# Patient Record
Sex: MALE | Race: WHITE | NOT HISPANIC OR LATINO | Employment: OTHER | URBAN - METROPOLITAN AREA
[De-identification: names, ages, dates, MRNs, and addresses within clinical notes are randomized per-mention and may not be internally consistent; named-entity substitution may affect disease eponyms.]

---

## 2018-01-15 NOTE — PROCEDURES
Results/Data    Procedure: Electromyogram and Nerve Conduction Study  Indication: Left Lower Extremity   Referred by Dr Jorge Puri  The procedure's were discussed with the patient  Written consent was obtained prior to the procedure and is detailed in the patient's record  Prior to the start of the procedure a time out was taken and the identity of the patient was confirmed via name and date of birth with the patient  The correct site and the procedure to be performed were confirmed  The correct side was confirmed if applicable  The positioning of the patient was verified  The availability of the correct equipment was verified  Procedure Start Time: 1:15    Technique: A sterile concentric needle electrode was used  The patient tolerated the procedure well  There were no complications  Results  :EMG LEFT LOWER EXTREMITY    Motor and sensory conduction studies were performed on the left peroneal tibial and sural nerves  The distal motor latencies were normal  Motor action potential amplitudes were normal  Motor conduction studies were normal including conduction of the peroneal nerve across the fibular head  Left peroneal and tibial F waves were normal     The left sural distal sensory latency was normal with normal sensory action potential amplitudes  H  reflexes were normal symmetrically    Concentric needle EMG was performed in the left EDB, tibialis anterior, medial gastrocnemius, vastus lateralis, adductor madnus and the lumbar paraspinal region  There was no evidence of spontaneous activity seen  The compound motor unit potentials were of normal configuration and interference patterns were full were full for effort    IMPRESSION: This is a normal EMG of the left lower extremity  NEIL Elliott   Electronically signed by : Khang Otto MD; Jul 5 2016  1:42PM EST                       (Author)

## 2018-07-09 ENCOUNTER — OFFICE VISIT (OUTPATIENT)
Dept: OBGYN CLINIC | Facility: MEDICAL CENTER | Age: 58
End: 2018-07-09
Payer: MEDICARE

## 2018-07-09 ENCOUNTER — APPOINTMENT (OUTPATIENT)
Dept: RADIOLOGY | Facility: MEDICAL CENTER | Age: 58
End: 2018-07-09
Payer: MEDICARE

## 2018-07-09 VITALS — HEART RATE: 73 BPM | SYSTOLIC BLOOD PRESSURE: 127 MMHG | DIASTOLIC BLOOD PRESSURE: 80 MMHG | WEIGHT: 196 LBS

## 2018-07-09 DIAGNOSIS — M19.049 CMC ARTHRITIS: ICD-10-CM

## 2018-07-09 DIAGNOSIS — G56.01 CARPAL TUNNEL SYNDROME OF RIGHT WRIST: ICD-10-CM

## 2018-07-09 DIAGNOSIS — M79.641 RIGHT HAND PAIN: ICD-10-CM

## 2018-07-09 DIAGNOSIS — M79.641 RIGHT HAND PAIN: Primary | ICD-10-CM

## 2018-07-09 PROCEDURE — 73130 X-RAY EXAM OF HAND: CPT

## 2018-07-09 PROCEDURE — 99213 OFFICE O/P EST LOW 20 MIN: CPT | Performed by: ORTHOPAEDIC SURGERY

## 2018-07-09 RX ORDER — MELOXICAM 15 MG/1
TABLET ORAL DAILY
COMMUNITY
End: 2018-07-30 | Stop reason: SDDI

## 2018-07-09 RX ORDER — ZOLPIDEM TARTRATE 12.5 MG/1
TABLET, FILM COATED, EXTENDED RELEASE ORAL
COMMUNITY

## 2018-07-09 RX ORDER — LIDOCAINE AND PRILOCAINE 25; 25 MG/G; MG/G
CREAM TOPICAL
COMMUNITY
End: 2018-07-30 | Stop reason: ALTCHOICE

## 2018-07-09 RX ORDER — VALSARTAN 160 MG/1
TABLET ORAL
COMMUNITY
End: 2018-07-30 | Stop reason: SDUPTHER

## 2018-07-09 RX ORDER — SIMVASTATIN 40 MG
TABLET ORAL
COMMUNITY

## 2018-07-09 RX ORDER — LANSOPRAZOLE 30 MG/1
CAPSULE, DELAYED RELEASE ORAL
COMMUNITY

## 2018-07-09 RX ORDER — QUETIAPINE FUMARATE 300 MG/1
TABLET, FILM COATED ORAL
COMMUNITY

## 2018-07-09 RX ORDER — OMEGA-3-ACID ETHYL ESTERS 1 G/1
CAPSULE, LIQUID FILLED ORAL
COMMUNITY
End: 2019-01-15

## 2018-07-09 RX ORDER — METOPROLOL SUCCINATE 100 MG/1
TABLET, EXTENDED RELEASE ORAL
COMMUNITY

## 2018-07-09 RX ORDER — OXYCODONE AND ACETAMINOPHEN 10; 325 MG/1; MG/1
TABLET ORAL
COMMUNITY

## 2018-07-09 NOTE — PROGRESS NOTES
CHIEF COMPLAINT:  Chief Complaint   Patient presents with    Right Hand - Pain       SUBJECTIVE:  Prerna Lundberg is a 66y o  year old LHD male who presents to the office with right thumb pain  Pt has been treated for this in the past by Dr Nelson Aragon  Pt states that he has had multiple surgeries to his right hand and wrist including CTR and multiple CMC surgeries   Pt states that his pain has increased since his last visit, aprox 2 months ago  Pt has started wearing thumb spica wrist brace due to the pain  Pt is taking percocet 10/325 for pain  Pt states that he also takes tylenol for more mild pain  Pt states that his pain is currently constant  in his his wrist and thumb and radiates up his arm  Pt states that he has numbness and tingling  in his right index, small, ring and long fingers       PAST MEDICAL HISTORY:  Past Medical History:   Diagnosis Date    Depression     Fractures     Head injury        PAST SURGICAL HISTORY:  Past Surgical History:   Procedure Laterality Date    HAND SURGERY      KNEE SURGERY      WRIST SURGERY         FAMILY HISTORY:  Family History   Problem Relation Age of Onset    Stroke Brother     Heart attack Brother     Colon cancer Mother     Heart disease Father     Lung cancer Father        SOCIAL HISTORY:  Social History   Substance Use Topics    Smoking status: Never Smoker    Smokeless tobacco: Never Used    Alcohol use No       MEDICATIONS:    Current Outpatient Prescriptions:     lansoprazole (PREVACID) 30 mg capsule, lansoprazole 30 mg capsule,delayed release, Disp: , Rfl:     lidocaine-prilocaine (EMLA) cream, lidocaine-prilocaine 2 5 %-2 5 % topical cream, Disp: , Rfl:     meloxicam (MOBIC) 15 mg tablet, Daily, Disp: , Rfl:     metoprolol succinate (TOPROL-XL) 100 mg 24 hr tablet, metoprolol succinate  mg tablet,extended release 24 hr, Disp: , Rfl:     omega-3-acid ethyl esters (LOVAZA) 1 g capsule, omega-3 acid ethyl esters 1 gram capsule, Disp: , Rfl:     oxyCODONE-acetaminophen (PERCOCET)  mg per tablet, oxycodone-acetaminophen 10 mg-325 mg tablet, Disp: , Rfl:     QUEtiapine (SEROquel) 300 mg tablet, quetiapine 300 mg tablet, Disp: , Rfl:     simvastatin (ZOCOR) 40 mg tablet, simvastatin 40 mg tablet, Disp: , Rfl:     valsartan (DIOVAN) 160 mg tablet, valsartan 160 mg tablet, Disp: , Rfl:     zolpidem (AMBIEN CR) 12 5 MG CR tablet, zolpidem ER 12 5 mg tablet,extended release,multiphase, Disp: , Rfl:     ALLERGIES:  Allergies   Allergen Reactions    Other        REVIEW OF SYSTEMS:  Review of Systems   Constitutional: Negative for chills, fever and unexpected weight change  HENT: Negative for hearing loss, nosebleeds and sore throat  Eyes: Negative for pain, redness and visual disturbance  Respiratory: Negative for cough, shortness of breath and wheezing  Cardiovascular: Negative for chest pain, palpitations and leg swelling  Gastrointestinal: Negative for abdominal pain, nausea and vomiting  Endocrine: Negative for polydipsia and polyuria  Genitourinary: Negative for dysuria and hematuria  Musculoskeletal: Negative for arthralgias, joint swelling and myalgias  Skin: Negative for rash and wound  Neurological: Negative for light-headedness, numbness and headaches  Psychiatric/Behavioral: Positive for dysphoric mood  Negative for decreased concentration and suicidal ideas  The patient is nervous/anxious          LABS:  HgA1c: No results found for: HGBA1C  BMP: No results found for: GLUCOSE, CALCIUM, NA, K, CO2, CL, BUN, CREATININE    _____________________________________________________  PHYSICAL EXAMINATION:  General: well developed and well nourished, alert, oriented times 3 and appears comfortable  Psychiatric: Normal  HEENT: Trachea Midline, No torticollis  Pulmonary: No audible wheezing or respiratory distress   Skin: No masses, erthema, lacerations, fluctation, ulcerations  Neurovascular: Sensation Intact to the Median, Ulnar, Radial Nerve, Motor Intact to the Median, Ulnar, Radial Nerve and Pulses Intact    MUSCULOSKELETAL EXAMINATION:    Positive grind test   crepitus with motion      ___________________________________________________  STUDIES REVIEWED:  I personally reviewed the following images of right wrist including Gema views and my interpretation is       PROCEDURES PERFORMED:  Procedures  No Procedures performed today    _____________________________________________________  ASSESSMENT/PLAN:    Assessment:   Thumb CMC Arthritis  right    Plan:       Diagnoses and all orders for this visit:    Right hand pain  -     XR hand 3+ vw right; Future    CMC arthritis    Carpal tunnel syndrome of right wrist  -     EMG 2 Limb Upper Extremity; Future    Other orders  -     lansoprazole (PREVACID) 30 mg capsule; lansoprazole 30 mg capsule,delayed release  -     lidocaine-prilocaine (EMLA) cream; lidocaine-prilocaine 2 5 %-2 5 % topical cream  -     metoprolol succinate (TOPROL-XL) 100 mg 24 hr tablet; metoprolol succinate  mg tablet,extended release 24 hr  -     meloxicam (MOBIC) 15 mg tablet; Daily  -     omega-3-acid ethyl esters (LOVAZA) 1 g capsule; omega-3 acid ethyl esters 1 gram capsule  -     oxyCODONE-acetaminophen (PERCOCET)  mg per tablet; oxycodone-acetaminophen 10 mg-325 mg tablet  -     QUEtiapine (SEROquel) 300 mg tablet; quetiapine 300 mg tablet  -     simvastatin (ZOCOR) 40 mg tablet; simvastatin 40 mg tablet  -     valsartan (DIOVAN) 160 mg tablet; valsartan 160 mg tablet  -     zolpidem (AMBIEN CR) 12 5 MG CR tablet; zolpidem ER 12 5 mg tablet,extended release,multiphase        Diagnoses and all orders for this visit:    Right hand pain  -     XR hand 3+ vw right; Future    CMC arthritis    Carpal tunnel syndrome of right wrist  -     EMG 2 Limb Upper Extremity;  Future    Other orders  -     lansoprazole (PREVACID) 30 mg capsule; lansoprazole 30 mg capsule,delayed release  -     lidocaine-prilocaine (EMLA) cream; lidocaine-prilocaine 2 5 %-2 5 % topical cream  -     metoprolol succinate (TOPROL-XL) 100 mg 24 hr tablet; metoprolol succinate  mg tablet,extended release 24 hr  -     meloxicam (MOBIC) 15 mg tablet; Daily  -     omega-3-acid ethyl esters (LOVAZA) 1 g capsule; omega-3 acid ethyl esters 1 gram capsule  -     oxyCODONE-acetaminophen (PERCOCET)  mg per tablet; oxycodone-acetaminophen 10 mg-325 mg tablet  -     QUEtiapine (SEROquel) 300 mg tablet; quetiapine 300 mg tablet  -     simvastatin (ZOCOR) 40 mg tablet; simvastatin 40 mg tablet  -     valsartan (DIOVAN) 160 mg tablet; valsartan 160 mg tablet  -     zolpidem (AMBIEN CR) 12 5 MG CR tablet; zolpidem ER 12 5 mg tablet,extended release,multiphase        Follow Up: To Do Next Visit:  Review EMG    General Discussions:     Aia 16 Arthritis: The anatomy and physiology of carpometacarpal joint arthritis was discussed with the patient today in the office  Deterioration of the articular cartilage eventually leads to hypermobility at the thumb Aia 16 joint, resulting in joint subluxation, osteophyte formation, cystic changes within the trapezium and base of the first metacarpal, as well as subchondral sclerosis  Eventually, pain, limited mobility, and compensatory hyperextension at the metacarpophalangeal joint may develop  While normal activity and usage of the thumb joint may provide a painful experience to the patient, this typically does not result in damage to the thumb or hand  Treatment options include resting thumb spica splints to decreased joint edema, pain, and inflammation  Therapy exercises to strengthen the thenar musculature may relieve pain, but do not alter the overall continued development of osteoarthritis  Oral medications, topical medications, corticosteroid injections may decrease pain and increase overall function  Eventually, approximately 5% of patients may require surgical intervention       Carpal Tunnel Syndrome: The anatomy and physiology of carpal tunnel syndrome was discussed with the patient today  Increase pressure localized under the transverse carpal ligament can cause pain, numbness, tingling, or dysesthesias within the median nerve distribution as well as feelings of fatigue, clumsiness, or awkwardness  These symptoms typically occur at night and worse in the morning upon waking  Eventually, untreated carpal tunnel syndrome can result in weakness and permanent loss of muscle within the thenar compartment of the hand  Treatment options were discussed with the patient  Conservative treatment includes nocturnal resting splints to keep the nerve in a neutral position, ergonomic changes within the work or home environment, activity modification, and tendon gliding exercises  Steroid injections within the carpal canal can help a majority of patients, however this is often self-limited in a majority of patients  Surgical intervention to divide the transverse carpal ligament typically results in a long-lasting relief of the patient's complaints, with the recurrence rate of less than 1%           Scribe Attestation    I,:   Aria Rodriguez am acting as a scribe while in the presence of the attending physician :        I,:   Tim Ramirez MD personally performed the services described in this documentation    as scribed in my presence :

## 2018-07-19 ENCOUNTER — OFFICE VISIT (OUTPATIENT)
Dept: OBGYN CLINIC | Facility: CLINIC | Age: 58
End: 2018-07-19
Payer: MEDICARE

## 2018-07-19 DIAGNOSIS — M19.049 CMC ARTHRITIS: Primary | ICD-10-CM

## 2018-07-19 PROCEDURE — 20600 DRAIN/INJ JOINT/BURSA W/O US: CPT

## 2018-07-19 PROCEDURE — 99213 OFFICE O/P EST LOW 20 MIN: CPT | Performed by: ORTHOPAEDIC SURGERY

## 2018-07-19 RX ADMIN — TRIAMCINOLONE ACETONIDE 20 MG: 40 INJECTION, SUSPENSION INTRA-ARTICULAR; INTRAMUSCULAR at 15:18

## 2018-07-19 RX ADMIN — LIDOCAINE HYDROCHLORIDE 2 ML: 10 INJECTION, SOLUTION INFILTRATION; PERINEURAL at 15:18

## 2018-07-19 RX ADMIN — Medication 0.05 MEQ: at 15:18

## 2018-07-19 RX ADMIN — LIDOCAINE HYDROCHLORIDE 0.5 ML: 10 INJECTION, SOLUTION INFILTRATION; PERINEURAL at 15:18

## 2018-07-19 NOTE — PROGRESS NOTES
CHIEF COMPLAINT:  Pain at base of right thumb  SUBJECTIVE:  Prerna Lundberg is a 66y o  year old LHD male who presents to the office for with right thumb pain  Gauri Modi is s/p right thumb suspensionplasty revision  He has been having difficulty with pain management after surgery for several years and has undergone multiple injections  He is currently being worked up for bilateral CTR and awaiting EMG evaluation  He brought in his daughter and was complaining of discomfort in his right thumb and was subsequently added on to the schedule today        PAST MEDICAL HISTORY:  Past Medical History:   Diagnosis Date    Depression     Fractures     Head injury        PAST SURGICAL HISTORY:  Past Surgical History:   Procedure Laterality Date    HAND SURGERY      KNEE SURGERY      WRIST SURGERY         FAMILY HISTORY:  Family History   Problem Relation Age of Onset    Stroke Brother     Heart attack Brother     Colon cancer Mother     Heart disease Father     Lung cancer Father        SOCIAL HISTORY:  Social History   Substance Use Topics    Smoking status: Never Smoker    Smokeless tobacco: Never Used    Alcohol use No       MEDICATIONS:    Current Outpatient Prescriptions:     lansoprazole (PREVACID) 30 mg capsule, lansoprazole 30 mg capsule,delayed release, Disp: , Rfl:     lidocaine-prilocaine (EMLA) cream, lidocaine-prilocaine 2 5 %-2 5 % topical cream, Disp: , Rfl:     meloxicam (MOBIC) 15 mg tablet, Daily, Disp: , Rfl:     metoprolol succinate (TOPROL-XL) 100 mg 24 hr tablet, metoprolol succinate  mg tablet,extended release 24 hr, Disp: , Rfl:     omega-3-acid ethyl esters (LOVAZA) 1 g capsule, omega-3 acid ethyl esters 1 gram capsule, Disp: , Rfl:     oxyCODONE-acetaminophen (PERCOCET)  mg per tablet, oxycodone-acetaminophen 10 mg-325 mg tablet, Disp: , Rfl:     QUEtiapine (SEROquel) 300 mg tablet, quetiapine 300 mg tablet, Disp: , Rfl:     simvastatin (ZOCOR) 40 mg tablet, simvastatin 40 mg tablet, Disp: , Rfl:     valsartan (DIOVAN) 160 mg tablet, valsartan 160 mg tablet, Disp: , Rfl:     zolpidem (AMBIEN CR) 12 5 MG CR tablet, zolpidem ER 12 5 mg tablet,extended release,multiphase, Disp: , Rfl:     ALLERGIES:  Allergies   Allergen Reactions    Other        REVIEW OF SYSTEMS:  Review of Systems   Constitutional: Negative for chills, fever and unexpected weight change  HENT: Negative for hearing loss, nosebleeds and sore throat  Eyes: Negative for pain, redness and visual disturbance  Respiratory: Negative for cough, shortness of breath and wheezing  Cardiovascular: Negative for chest pain, palpitations and leg swelling  Gastrointestinal: Negative for abdominal pain, nausea and vomiting  Endocrine: Negative for polydipsia and polyuria  Genitourinary: Negative for dysuria and hematuria  Musculoskeletal: Positive for arthralgias  Negative for joint swelling and myalgias  Skin: Negative for rash and wound  Neurological: Negative for light-headedness, numbness and headaches  Psychiatric/Behavioral: Negative for decreased concentration, dysphoric mood and suicidal ideas  The patient is not nervous/anxious          LABS:  HgA1c: No results found for: HGBA1C  BMP: No results found for: GLUCOSE, CALCIUM, NA, K, CO2, CL, BUN, CREATININE    _____________________________________________________  PHYSICAL EXAMINATION:  General: well developed and well nourished, alert, oriented times 3 and appears comfortable  Psychiatric: Normal  HEENT: Trachea Midline, No torticollis  Pulmonary: No audible wheezing or respiratory distress   Skin: No masses, erthema, lacerations, fluctation, ulcerations  Neurovascular: Sensation Intact to the Median, Ulnar, Radial Nerve, Motor Intact to the Median, Ulnar, Radial Nerve and Pulses Intact    MUSCULOSKELETAL EXAMINATION:  Right thumb without swelling, tender over base of thumb, no bushra  ___________________________________________________  STUDIES REVIEWED:  No studies reviewed  PROCEDURES PERFORMED:  Small joint arthrocentesis  Date/Time: 7/19/2018 3:18 PM  Consent given by: patient  Timeout: Immediately prior to procedure a time out was called to verify the correct patient, procedure, equipment, support staff and site/side marked as required   Supporting Documentation  Indications: pain   Procedure Details  Location: thumb - R thumb CMC  Needle size: 27 G  Ultrasound guidance: no  Medications administered: 0 05 mEq sodium bicarbonate 8 4 %; 20 mg triamcinolone acetonide 40 mg/mL; 2 mL lidocaine 1 %; 0 5 mL lidocaine 1 %    Patient tolerance: patient tolerated the procedure well with no immediate complications  Dressing:  Sterile dressing applied           _____________________________________________________  ASSESSMENT/PLAN:    Assessment:   Thumb CMC Arthritis  Right, s/p suspensionplasty    Plan:   Cortisone injection given today in right thumb CMC trapezial space    Patient was advised that cortisone is not a long term treatment option  Plan to await EMG results and consider alternative pain management for his right thumb  Follow Up:  Patient will follow up at his previously scheduled appointment      To Do Next Visit:  Re-evaluation of current issue    General Discussions:  Patient is s/p revision suspensioplasty of rightthumb Aia 16 joint with recurrent pain  Minimal options are available for treatment  Would consider arthrodesis, however, he already has an arthrodesis at his MP joint of same thumb        Scribe Attestation    I,:   Kevin Ang am acting as a scribe while in the presence of the attending physician :        I,:   Cedric Drake MD personally performed the services described in this documentation    as scribed in my presence :

## 2018-07-20 RX ORDER — TRIAMCINOLONE ACETONIDE 40 MG/ML
20 INJECTION, SUSPENSION INTRA-ARTICULAR; INTRAMUSCULAR
Status: COMPLETED | OUTPATIENT
Start: 2018-07-19 | End: 2018-07-19

## 2018-07-20 RX ORDER — LIDOCAINE HYDROCHLORIDE 10 MG/ML
2 INJECTION, SOLUTION INFILTRATION; PERINEURAL
Status: COMPLETED | OUTPATIENT
Start: 2018-07-19 | End: 2018-07-19

## 2018-07-20 RX ORDER — LIDOCAINE HYDROCHLORIDE 10 MG/ML
0.5 INJECTION, SOLUTION INFILTRATION; PERINEURAL
Status: COMPLETED | OUTPATIENT
Start: 2018-07-19 | End: 2018-07-19

## 2018-07-24 ENCOUNTER — TELEPHONE (OUTPATIENT)
Dept: NEUROLOGY | Facility: CLINIC | Age: 58
End: 2018-07-24

## 2018-07-24 ENCOUNTER — PROCEDURE VISIT (OUTPATIENT)
Dept: NEUROLOGY | Facility: CLINIC | Age: 58
End: 2018-07-24
Payer: MEDICARE

## 2018-07-24 DIAGNOSIS — G56.01 CARPAL TUNNEL SYNDROME OF RIGHT WRIST: ICD-10-CM

## 2018-07-24 PROCEDURE — 95886 MUSC TEST DONE W/N TEST COMP: CPT | Performed by: PHYSICAL MEDICINE & REHABILITATION

## 2018-07-24 PROCEDURE — 95908 NRV CNDJ TST 3-4 STUDIES: CPT | Performed by: PHYSICAL MEDICINE & REHABILITATION

## 2018-07-24 NOTE — PROGRESS NOTES
The procedure was discussed with the patient  Verbal consent was obtained after discussing risks and benefits  A sterile concentric needle electrode was used  The patient tolerated the procedure well  There were no complications  EMG RIGHT UPPER EXTREMITY    Motor and sensory conduction studies were performed on the right median and ulnar nerves  The median motor terminal latency was prolonged  with a low compound motor action potential amplitude and a normal conduction velocity across the wrist   The ulnar compound motor action potential was normal     Median and ulnar F waves were normal     The median sensory peak latency was prolonged with a normal sensory action potential amplitude  The ulnar action potential was normal    The median and ulnar palmar evoked  response was normal     Concentric needle EMG was performed in various distal and proximal muscles of the right upper extremity including APB, FDI, pronator teres, deltoid, biceps, triceps and low cervical paraspinal region  There was no evidence of active denervation in any of the muscles tested  Significant decreased recruitment was noted in the abductor pollicis brevis  The compound motor unit action potentials were of normal configuration with interference patterns being full or full for effort in the remaining muscles tested  IMPRESSION:  There is electrophysiologic evidence of a:    1  Moderate median nerve compression neuropathy at the wrist with demyelinative and axonal changes, consistent with a diagnosis of carpal tunnel syndrome  2 There is no evidence of a cervical radiculopathy or ulnar neuropathy  Clinical correlation is recommended  NEIL Martinez

## 2018-07-24 NOTE — TELEPHONE ENCOUNTER
Per Dr Flavia Doyle, said to give patient progress notes of visit today, said she did discuss with patient

## 2018-07-30 ENCOUNTER — OFFICE VISIT (OUTPATIENT)
Dept: OBGYN CLINIC | Facility: MEDICAL CENTER | Age: 58
End: 2018-07-30
Payer: MEDICARE

## 2018-07-30 VITALS
WEIGHT: 191 LBS | HEIGHT: 69 IN | HEART RATE: 68 BPM | DIASTOLIC BLOOD PRESSURE: 74 MMHG | SYSTOLIC BLOOD PRESSURE: 114 MMHG | BODY MASS INDEX: 28.29 KG/M2

## 2018-07-30 DIAGNOSIS — Z01.818 ENCOUNTER FOR PREADMISSION TESTING: Primary | ICD-10-CM

## 2018-07-30 DIAGNOSIS — M19.049 CMC ARTHRITIS: ICD-10-CM

## 2018-07-30 DIAGNOSIS — M65.331 TRIGGER MIDDLE FINGER OF RIGHT HAND: ICD-10-CM

## 2018-07-30 DIAGNOSIS — M65.321 TRIGGER INDEX FINGER OF RIGHT HAND: ICD-10-CM

## 2018-07-30 DIAGNOSIS — G56.01 CARPAL TUNNEL SYNDROME OF RIGHT WRIST: Primary | ICD-10-CM

## 2018-07-30 PROCEDURE — 20550 NJX 1 TENDON SHEATH/LIGAMENT: CPT | Performed by: ORTHOPAEDIC SURGERY

## 2018-07-30 PROCEDURE — 99214 OFFICE O/P EST MOD 30 MIN: CPT | Performed by: ORTHOPAEDIC SURGERY

## 2018-07-30 RX ORDER — VALSARTAN AND HYDROCHLOROTHIAZIDE 160; 12.5 MG/1; MG/1
1 TABLET, FILM COATED ORAL
COMMUNITY
Start: 2018-07-23 | End: 2018-09-18

## 2018-07-30 RX ORDER — CLONAZEPAM 1 MG/1
1 TABLET ORAL 4 TIMES DAILY PRN
COMMUNITY
Start: 2018-07-23

## 2018-07-30 RX ORDER — NEBIVOLOL HYDROCHLORIDE 20 MG/1
20 TABLET ORAL AS NEEDED
COMMUNITY
Start: 2018-07-23

## 2018-07-30 RX ORDER — CELECOXIB 200 MG/1
CAPSULE ORAL
COMMUNITY
End: 2018-07-30 | Stop reason: ALTCHOICE

## 2018-07-30 RX ADMIN — LIDOCAINE HYDROCHLORIDE 0.5 ML: 10 INJECTION, SOLUTION INFILTRATION; PERINEURAL at 16:03

## 2018-07-30 RX ADMIN — TRIAMCINOLONE ACETONIDE 20 MG: 40 INJECTION, SUSPENSION INTRA-ARTICULAR; INTRAMUSCULAR at 16:03

## 2018-07-30 NOTE — H&P
CHIEF COMPLAINT:  Chief Complaint   Patient presents with    Right Wrist - Follow-up       SUBJECTIVE:    Sanket Disla is a 66y o  year old LHD male who presents to the office with right thumb pain  Pt has been treated for this in the past by Dr Denman Cogan  Pt states that he has had multiple surgeries to his right hand and wrist including CTR and multiple CMC surgeries   Pt states that his pain has increased since his last visit  Pt is wearing thumb spica wrist brace due to the pain  Pt is taking percocet 10/325 for pain  Pt states that he also takes tylenol for more mild pain  Pt states that his pain is currently constant  in his his wrist and thumb and radiates up his arm  Pt states that he has numbness and tingling  in his right index, small, ring and long fingers  Pt states that he now has locking in his right index long and ring fingers         PAST MEDICAL HISTORY:  Past Medical History:   Diagnosis Date    Depression     Fractures     Head injury        PAST SURGICAL HISTORY:  Past Surgical History:   Procedure Laterality Date    HAND SURGERY      KNEE SURGERY      WRIST SURGERY         FAMILY HISTORY:  Family History   Problem Relation Age of Onset    Stroke Brother     Heart attack Brother     Colon cancer Mother     Heart disease Father     Lung cancer Father        SOCIAL HISTORY:  Social History   Substance Use Topics    Smoking status: Never Smoker    Smokeless tobacco: Never Used    Alcohol use No       MEDICATIONS:    Current Outpatient Prescriptions:     BYSTOLIC 20 MG tablet, , Disp: , Rfl:     celecoxib (CeleBREX) 200 mg capsule, Take 1 capsule every day by oral route as needed  , Disp: , Rfl:     clonazePAM (KlonoPIN) 1 mg tablet, , Disp: , Rfl:     lansoprazole (PREVACID) 30 mg capsule, lansoprazole 30 mg capsule,delayed release, Disp: , Rfl:     lidocaine-prilocaine (EMLA) cream, lidocaine-prilocaine 2 5 %-2 5 % topical cream, Disp: , Rfl:     meloxicam (MOBIC) 15 mg tablet, Daily, Disp: , Rfl:     metoprolol succinate (TOPROL-XL) 100 mg 24 hr tablet, metoprolol succinate  mg tablet,extended release 24 hr, Disp: , Rfl:     omega-3-acid ethyl esters (LOVAZA) 1 g capsule, omega-3 acid ethyl esters 1 gram capsule, Disp: , Rfl:     oxyCODONE-acetaminophen (PERCOCET)  mg per tablet, oxycodone-acetaminophen 10 mg-325 mg tablet, Disp: , Rfl:     QUEtiapine (SEROquel) 300 mg tablet, quetiapine 300 mg tablet, Disp: , Rfl:     simvastatin (ZOCOR) 40 mg tablet, simvastatin 40 mg tablet, Disp: , Rfl:     valsartan (DIOVAN) 160 mg tablet, valsartan 160 mg tablet, Disp: , Rfl:     valsartan-hydrochlorothiazide (DIOVAN-HCT) 160-12 5 MG per tablet, , Disp: , Rfl:     zolpidem (AMBIEN CR) 12 5 MG CR tablet, zolpidem ER 12 5 mg tablet,extended release,multiphase, Disp: , Rfl:     ALLERGIES:  Allergies   Allergen Reactions    Other        REVIEW OF SYSTEMS:  Review of Systems   Constitutional: Negative for chills, fever and unexpected weight change  HENT: Negative for hearing loss, nosebleeds and sore throat  Eyes: Negative for pain, redness and visual disturbance  Respiratory: Negative for cough, shortness of breath and wheezing  Cardiovascular: Negative for chest pain, palpitations and leg swelling  Gastrointestinal: Negative for abdominal pain, nausea and vomiting  Endocrine: Negative for polydipsia and polyuria  Genitourinary: Negative for dysuria and hematuria  Musculoskeletal: Positive for arthralgias, joint swelling and myalgias  Skin: Negative for rash and wound  Neurological: Negative for light-headedness, numbness and headaches  Psychiatric/Behavioral: Positive for decreased concentration  Negative for dysphoric mood and suicidal ideas  The patient is nervous/anxious          VITALS:  Vitals:    07/30/18 1445   BP: 114/74   Pulse: 68       LABS:  HgA1c: No results found for: HGBA1C  BMP: No results found for: GLUCOSE, CALCIUM, NA, K, CO2, CL, BUN, CREATININE    _____________________________________________________  PHYSICAL EXAMINATION:  General: well developed and well nourished, alert, oriented times 3 and appears comfortable  Psychiatric: Normal  HEENT: Trachea Midline, No torticollis  Pulmonary: No audible wheezing or respiratory distress   Skin: No masses, erthema, lacerations, fluctation, ulcerations  Neurovascular: Sensation intact to the Ulnar Nerve, Sensation Intact to the Radial Nerve, Motor Intact to the Median, Ulnar, Radial Nerve and Pulses Intact    MUSCULOSKELETAL EXAMINATION:  Right Carpal Tunnel Exam:    Negative thenar atrophy  Positive phalen's test  Positive carpal tunnel compression  Positive tinels over median nerve at the wrist   Opposition strength 5/5  Abduction strength 5/5  CMC Exam:  No adduction contracture  Thumb MCP joint with solid arthrodesis  Positive localized tenderness over radial and dorsal aspect of thumb (CMC joint)  Grind test is Positive for pain    Trigger finger:  Right long, index, finger:  Positive palpable nodule over the A1 pulley  Positive tenderness to palpation over A1 pulley  Negative catching  Positive clicking only with direct pressure over A1 pulley          ___________________________________________________  STUDIES REVIEWED:  I personally reviewed the following images of EMG RUE and my interpretation is right carpal tunnel       PROCEDURES PERFORMED:  Hand/upper extremity injection  Date/Time: 7/30/2018 4:03 PM  Consent given by: patient  Site marked: site marked  Timeout: Immediately prior to procedure a time out was called to verify the correct patient, procedure, equipment, support staff and site/side marked as required   Supporting Documentation  Indications: pain   Procedure Details  Condition:trigger finger Location: index finger - R index A1   Needle size: 25 G  Ultrasound guidance: no  Approach: volar  Medications administered: 0 5 mL lidocaine 1 %; 20 mg triamcinolone acetonide 40 mg/mL  Patient tolerance: patient tolerated the procedure well with no immediate complications  Dressing:  Sterile dressing applied   Hand/upper extremity injection  Date/Time: 7/30/2018 4:03 PM  Consent given by: patient  Site marked: site marked  Timeout: Immediately prior to procedure a time out was called to verify the correct patient, procedure, equipment, support staff and site/side marked as required   Supporting Documentation  Indications: pain   Procedure Details  Condition:trigger finger Location: long finger - R long A1   Needle size: 25 G  Ultrasound guidance: no  Approach: volar  Medications administered: 0 5 mL lidocaine 1 %; 20 mg triamcinolone acetonide 40 mg/mL  Patient tolerance: patient tolerated the procedure well with no immediate complications  Dressing:  Sterile dressing applied            _____________________________________________________  ASSESSMENT/PLAN:    Assessment:   Right thumb pain and failed CMC suspensionplasty X 2  right Carpal Tunnel Syndrome    Plan:   Surgical procedures were discussed for both conditions above  We discussed ECTR and revision suspensioplasty of the right wrist and thumb CMC joint  See discussion below    Diagnoses and all orders for this visit:    Carpal tunnel syndrome of right wrist    CMC arthritis    Other orders  -     celecoxib (CeleBREX) 200 mg capsule; Take 1 capsule every day by oral route as needed  -     clonazePAM (KlonoPIN) 1 mg tablet;   -     BYSTOLIC 20 MG tablet;   -     valsartan-hydrochlorothiazide (DIOVAN-HCT) 160-12 5 MG per tablet; Follow Up:  Return for after surgery   To Do Next Visit:  Re-evaluation of current issue, Splint off and Sutures out    General Discussions:  Trigger Finger: The anatomy and physiology of trigger finger was discussed with the patient today in the office    Edema and increased contact pressure within the flexor tendons at the A1 pulley can cause pain, crepitation, and triggering or locking of the digit resulting in limitation of function  Symptoms can occur at anytime but are typically worse in the morning or after a brief rest from repetitive activity  Treatment options include resting/nighttime MP blocking splints to decrease edema, oral anti-inflammatory medications, home or formal therapy exercises, up to 2 steroid injections within the tendon sheath, or surgical release  While majority of patients do respond to conservative treatment, up to 20% may require surgical release  Operative Discussions:  Endoscopic Carpal Tunnel Release: The anatomy and physiology of carpal tunnel syndrome was discussed with the patient today  Increase pressure localized under the transverse carpal ligament can cause pain, numbness, tingling, or dysesthesias within the median nerve distribution as well as feelings of fatigue, clumsiness, or awkwardness  These symptoms typically occur at night and worse in the morning upon waking  Eventually, untreated carpal tunnel syndrome can result in weakness and permanent loss of muscle within the thenar compartment of the hand  Treatment options were discussed with the patient  Conservative treatment includes nocturnal resting splints to keep the nerve in a neutral position, ergonomic changes within the work or home environment, activity modification, and tendon gliding exercises  Vitamin B6 one tablet daily over the counter may helpful to reduce symptoms  Steroid injections within the carpal canal can help a majority of patients, however this is often self-limited in a majority of patients  Surgical intervention to divide the transverse carpal ligament typically results in a long-lasting relief of the patient's complaints, with the recurrence rate of less than 1%  The patient has elected to undergo an endoscopic carpal tunnel release    The single incision technique was discussed with the patient, which results in approximately a two-week recovery time less wound complications  In the postoperative period, light activities are allowed immediately, driving is allowed when narcotic medication has stopped, and the bandages may be removed and incision may get wet after 2 days  Heavy activities (lifting more than approximately 10 pounds) will be allowed after follow up appointment in 1-2 weeks  While night symptoms (waking from sleep, pain, and discomfort in the hands) generally improves rapidly, the numbness and tingling as well as the strength will slowly improve over weeks to months depending on the chronicity and severity of the carpal tunnel syndrome  Pillar pain and scar discomfort were discussed with the patient which are self-limiting conditions  The risks of bleeding and infection from the surgery are less than 1%  Risk of recurrence is approximately 0 5%  The risks of nerve injury or nerve damage or damage to the blood vessels is approximately 1 in 1200  The patient has an understanding of the above mentioned discussion  The risks and benefits of the procedure were explained to the patient, which include, but are not limited to: Bleeding, infection, recurrence, pain, scar, damage to tendons, damage to nerves, and damage to blood vessels, failure to give desired results and complications related to anesthesia  These risks, along with alternative conservative treatment options, and postoperative protocols were voiced back and understood by the patient  All questions were answered to the patient's satisfaction  The patient agrees to comply with a standard postoperative protocol, and is willing to proceed  Education was provided via written and auditory forms  There were no barriers to learning  Written handouts regarding wound care, incision and scar care, and general preoperative information was provided to the patient  Prior to surgery, the patient may be requested to stop all anti-inflammatory medications    Prophylactic aspirin, Plavix, and Coumadin may be allowed to be continued  Medications including vitamin E , ginkgo, and fish oil are requested to be stopped approximately one week prior to surgery    Thumb CMC suspensionplasty revision - we discussed the procedure of revision suspensionplasty which will include anchors and suture tape and/or K-wire fixation  This will be his third operation at this joint and we discussed the possibility of this being his last   If this fails, the only other alternative is arthrodesis of thumb MC to Index MC, however, given he already has an arthrodesis at the MP joint, I told him this is a poor function alternative  Patient fully understands the risks and benefits of the procedure      Scribe Attestation    I,:   Cory Haile am acting as a scribe while in the presence of the attending physician :        I,:   Mary Galan MD personally performed the services described in this documentation    as scribed in my presence :

## 2018-07-30 NOTE — PROGRESS NOTES
CHIEF COMPLAINT:  Chief Complaint   Patient presents with    Right Wrist - Follow-up       SUBJECTIVE:    Noé Harris is a 66y o  year old LHD male who presents to the office with right thumb pain  Pt has been treated for this in the past by Dr Ninetta Duverney  Pt states that he has had multiple surgeries to his right hand and wrist including CTR and multiple CMC surgeries   Pt states that his pain has increased since his last visit  Pt is wearing thumb spica wrist brace due to the pain  Pt is taking percocet 10/325 for pain  Pt states that he also takes tylenol for more mild pain  Pt states that his pain is currently constant  in his his wrist and thumb and radiates up his arm  Pt states that he has numbness and tingling  in his right index, small, ring and long fingers   Pt states that he now has locking in his right index long and ring fingers         PAST MEDICAL HISTORY:  Past Medical History:   Diagnosis Date    Depression     Fractures     Head injury        PAST SURGICAL HISTORY:  Past Surgical History:   Procedure Laterality Date    HAND SURGERY      KNEE SURGERY      WRIST SURGERY         FAMILY HISTORY:  Family History   Problem Relation Age of Onset    Stroke Brother     Heart attack Brother     Colon cancer Mother     Heart disease Father     Lung cancer Father        SOCIAL HISTORY:  Social History   Substance Use Topics    Smoking status: Never Smoker    Smokeless tobacco: Never Used    Alcohol use No       MEDICATIONS:    Current Outpatient Prescriptions:     BYSTOLIC 20 MG tablet, , Disp: , Rfl:     clonazePAM (KlonoPIN) 1 mg tablet, , Disp: , Rfl:     lansoprazole (PREVACID) 30 mg capsule, lansoprazole 30 mg capsule,delayed release, Disp: , Rfl:     metoprolol succinate (TOPROL-XL) 100 mg 24 hr tablet, metoprolol succinate  mg tablet,extended release 24 hr, Disp: , Rfl:     omega-3-acid ethyl esters (LOVAZA) 1 g capsule, omega-3 acid ethyl esters 1 gram capsule, Disp: , Rfl:     oxyCODONE-acetaminophen (PERCOCET)  mg per tablet, oxycodone-acetaminophen 10 mg-325 mg tablet, Disp: , Rfl:     QUEtiapine (SEROquel) 300 mg tablet, quetiapine 300 mg tablet, Disp: , Rfl:     simvastatin (ZOCOR) 40 mg tablet, simvastatin 40 mg tablet, Disp: , Rfl:     valsartan-hydrochlorothiazide (DIOVAN-HCT) 160-12 5 MG per tablet, , Disp: , Rfl:     zolpidem (AMBIEN CR) 12 5 MG CR tablet, zolpidem ER 12 5 mg tablet,extended release,multiphase, Disp: , Rfl:     ALLERGIES:  Allergies   Allergen Reactions    Other        REVIEW OF SYSTEMS:  Review of Systems   Constitutional: Negative for chills, fever and unexpected weight change  HENT: Negative for hearing loss, nosebleeds and sore throat  Eyes: Negative for pain, redness and visual disturbance  Respiratory: Negative for cough, shortness of breath and wheezing  Cardiovascular: Negative for chest pain, palpitations and leg swelling  Gastrointestinal: Negative for abdominal pain, nausea and vomiting  Endocrine: Negative for polydipsia and polyuria  Genitourinary: Negative for dysuria and hematuria  Musculoskeletal: Positive for arthralgias, joint swelling and myalgias  Skin: Negative for rash and wound  Neurological: Negative for light-headedness, numbness and headaches  Psychiatric/Behavioral: Positive for decreased concentration  Negative for dysphoric mood and suicidal ideas  The patient is nervous/anxious          VITALS:  Vitals:    07/30/18 1445   BP: 114/74   Pulse: 68       LABS:  HgA1c: No results found for: HGBA1C  BMP: No results found for: GLUCOSE, CALCIUM, NA, K, CO2, CL, BUN, CREATININE    _____________________________________________________  PHYSICAL EXAMINATION:  General: well developed and well nourished, alert, oriented times 3 and appears comfortable  Psychiatric: Normal  HEENT: Trachea Midline, No torticollis  Pulmonary: No audible wheezing or respiratory distress   Skin: No masses, erthema, lacerations, fluctation, ulcerations  Neurovascular: Sensation intact to the Ulnar Nerve, Sensation Intact to the Radial Nerve, Motor Intact to the Median, Ulnar, Radial Nerve and Pulses Intact    MUSCULOSKELETAL EXAMINATION:  Right Carpal Tunnel Exam:    Negative thenar atrophy  Positive phalen's test  Positive carpal tunnel compression  Positive tinels over median nerve at the wrist   Opposition strength 5/5  Abduction strength 5/5  CMC Exam:  No adduction contracture  Thumb MCP joint with solid arthrodesis  Positive localized tenderness over radial and dorsal aspect of thumb (CMC joint)  Grind test is Positive for pain    Trigger finger:  Right long, index, finger:  Positive palpable nodule over the A1 pulley  Positive tenderness to palpation over A1 pulley  Negative catching  Positive clicking only with direct pressure over A1 pulley          ___________________________________________________  STUDIES REVIEWED:  I personally reviewed the following images of EMG RUE and my interpretation is right carpal tunnel       PROCEDURES PERFORMED:  Hand/upper extremity injection  Date/Time: 7/30/2018 4:03 PM  Consent given by: patient  Site marked: site marked  Timeout: Immediately prior to procedure a time out was called to verify the correct patient, procedure, equipment, support staff and site/side marked as required   Supporting Documentation  Indications: pain   Procedure Details  Condition:trigger finger Location: index finger - R index A1   Needle size: 25 G  Ultrasound guidance: no  Approach: volar  Medications administered: 0 5 mL lidocaine 1 %; 20 mg triamcinolone acetonide 40 mg/mL  Patient tolerance: patient tolerated the procedure well with no immediate complications  Dressing:  Sterile dressing applied   Hand/upper extremity injection  Date/Time: 7/30/2018 4:03 PM  Consent given by: patient  Site marked: site marked  Timeout: Immediately prior to procedure a time out was called to verify the correct patient, procedure, equipment, support staff and site/side marked as required   Supporting Documentation  Indications: pain   Procedure Details  Condition:trigger finger Location: long finger - R long A1   Needle size: 25 G  Ultrasound guidance: no  Approach: volar  Medications administered: 0 5 mL lidocaine 1 %; 20 mg triamcinolone acetonide 40 mg/mL  Patient tolerance: patient tolerated the procedure well with no immediate complications  Dressing:  Sterile dressing applied            _____________________________________________________  ASSESSMENT/PLAN:    Assessment:   Right thumb pain and failed CMC suspensionplasty X 2  right Carpal Tunnel Syndrome    Plan:   Surgical procedures were discussed for both conditions above  We discussed ECTR and revision suspensioplasty of the right wrist and thumb CMC joint  See discussion below  The patient is already on chronic pain medications with his PCP  We will only prescribe Ibuprofen for after surgery since his pain medication also includes tylenol  Diagnoses and all orders for this visit:    Carpal tunnel syndrome of right wrist  -     Case request operating room: Right RELEASE CARPAL TUNNEL ENDOSCOPIC, Right suspensionplasty thumb cmc; Standing  -     Case request operating room: Right RELEASE CARPAL TUNNEL ENDOSCOPIC, Right suspensionplasty thumb cmc  -     Ambulatory referral to PT/OT hand therapy; Future    CMC arthritis  -     Case request operating room: Right RELEASE CARPAL TUNNEL ENDOSCOPIC, Right suspensionplasty thumb cmc; Standing  -     Case request operating room: Right RELEASE CARPAL TUNNEL ENDOSCOPIC, Right suspensionplasty thumb cmc  -     Ambulatory referral to PT/OT hand therapy; Future    Trigger index finger of right hand  -     Hand/upper extremity injection    Trigger middle finger of right hand  -     Hand/upper extremity injection    Other orders  -     Discontinue: celecoxib (CeleBREX) 200 mg capsule;  Take 1 capsule every day by oral route as needed  -     clonazePAM (KlonoPIN) 1 mg tablet;   -     BYSTOLIC 20 MG tablet;   -     valsartan-hydrochlorothiazide (DIOVAN-HCT) 160-12 5 MG per tablet;   -     Void on call to OR; Standing  -     Insert peripheral IV; Standing  -     Diet NPO; Sips with meds; Standing            Follow Up:  Return for after surgery   To Do Next Visit:  Re-evaluation of current issue, Splint off and Sutures out    General Discussions:  Trigger Finger: The anatomy and physiology of trigger finger was discussed with the patient today in the office  Edema and increased contact pressure within the flexor tendons at the A1 pulley can cause pain, crepitation, and triggering or locking of the digit resulting in limitation of function  Symptoms can occur at anytime but are typically worse in the morning or after a brief rest from repetitive activity  Treatment options include resting/nighttime MP blocking splints to decrease edema, oral anti-inflammatory medications, home or formal therapy exercises, up to 2 steroid injections within the tendon sheath, or surgical release  While majority of patients do respond to conservative treatment, up to 20% may require surgical release  Operative Discussions:  Endoscopic Carpal Tunnel Release: The anatomy and physiology of carpal tunnel syndrome was discussed with the patient today  Increase pressure localized under the transverse carpal ligament can cause pain, numbness, tingling, or dysesthesias within the median nerve distribution as well as feelings of fatigue, clumsiness, or awkwardness  These symptoms typically occur at night and worse in the morning upon waking  Eventually, untreated carpal tunnel syndrome can result in weakness and permanent loss of muscle within the thenar compartment of the hand  Treatment options were discussed with the patient    Conservative treatment includes nocturnal resting splints to keep the nerve in a neutral position, ergonomic changes within the work or home environment, activity modification, and tendon gliding exercises  Vitamin B6 one tablet daily over the counter may helpful to reduce symptoms  Steroid injections within the carpal canal can help a majority of patients, however this is often self-limited in a majority of patients  Surgical intervention to divide the transverse carpal ligament typically results in a long-lasting relief of the patient's complaints, with the recurrence rate of less than 1%  The patient has elected to undergo an endoscopic carpal tunnel release  The single incision technique was discussed with the patient, which results in approximately a two-week recovery time less wound complications  In the postoperative period, light activities are allowed immediately, driving is allowed when narcotic medication has stopped, and the bandages may be removed and incision may get wet after 2 days  Heavy activities (lifting more than approximately 10 pounds) will be allowed after follow up appointment in 1-2 weeks  While night symptoms (waking from sleep, pain, and discomfort in the hands) generally improves rapidly, the numbness and tingling as well as the strength will slowly improve over weeks to months depending on the chronicity and severity of the carpal tunnel syndrome  Pillar pain and scar discomfort were discussed with the patient which are self-limiting conditions  The risks of bleeding and infection from the surgery are less than 1%  Risk of recurrence is approximately 0 5%  The risks of nerve injury or nerve damage or damage to the blood vessels is approximately 1 in 1200  The patient has an understanding of the above mentioned discussion  The risks and benefits of the procedure were explained to the patient, which include, but are not limited to: Bleeding, infection, recurrence, pain, scar, damage to tendons, damage to nerves, and damage to blood vessels, failure to give desired results and complications related to anesthesia  These risks, along with alternative conservative treatment options, and postoperative protocols were voiced back and understood by the patient  All questions were answered to the patient's satisfaction  The patient agrees to comply with a standard postoperative protocol, and is willing to proceed  Education was provided via written and auditory forms  There were no barriers to learning  Written handouts regarding wound care, incision and scar care, and general preoperative information was provided to the patient  Prior to surgery, the patient may be requested to stop all anti-inflammatory medications  Prophylactic aspirin, Plavix, and Coumadin may be allowed to be continued  Medications including vitamin E , ginkgo, and fish oil are requested to be stopped approximately one week prior to surgery    Thumb CMC suspensionplasty revision - we discussed the procedure of revision suspensionplasty which will include anchors and suture tape and/or K-wire fixation  This will be his third operation at this joint and we discussed the possibility of this being his last   If this fails, the only other alternative is arthrodesis of thumb MC to Index MC, however, given he already has an arthrodesis at the MP joint, I told him this is a poor function alternative  Patient fully understands the risks and benefits of the procedure      Scribe Attestation    I,:   Xavi Landeros am acting as a scribe while in the presence of the attending physician :        I,:   Anna Quiñones MD personally performed the services described in this documentation    as scribed in my presence :

## 2018-07-31 RX ORDER — LIDOCAINE HYDROCHLORIDE 10 MG/ML
0.5 INJECTION, SOLUTION INFILTRATION; PERINEURAL
Status: COMPLETED | OUTPATIENT
Start: 2018-07-30 | End: 2018-07-30

## 2018-07-31 RX ORDER — TRIAMCINOLONE ACETONIDE 40 MG/ML
20 INJECTION, SUSPENSION INTRA-ARTICULAR; INTRAMUSCULAR
Status: COMPLETED | OUTPATIENT
Start: 2018-07-30 | End: 2018-07-30

## 2018-08-20 RX ORDER — AMLODIPINE BESYLATE 10 MG/1
10 TABLET ORAL DAILY
COMMUNITY

## 2018-08-20 NOTE — PRE-PROCEDURE INSTRUCTIONS
Pre-Surgery Instructions:   Medication Instructions    amLODIPine (NORVASC) 10 mg tablet Patient was instructed by Physician and understands   BYSTOLIC 20 MG tablet Patient was instructed by Physician and understands   clonazePAM (KlonoPIN) 1 mg tablet Patient was instructed by Physician and understands   lansoprazole (PREVACID) 30 mg capsule Patient was instructed by Physician and understands   metoprolol succinate (TOPROL-XL) 100 mg 24 hr tablet Patient was instructed by Physician and understands   omega-3-acid ethyl esters (LOVAZA) 1 g capsule Patient was instructed by Physician and understands   oxyCODONE-acetaminophen (PERCOCET)  mg per tablet Patient was instructed by Physician and understands   QUEtiapine (SEROquel) 300 mg tablet Patient was instructed by Physician and understands   simvastatin (ZOCOR) 40 mg tablet Patient was instructed by Physician and understands   valsartan-hydrochlorothiazide (DIOVAN-HCT) 160-12 5 MG per tablet Patient was instructed by Physician and understands   zolpidem (AMBIEN CR) 12 5 MG CR tablet Patient was instructed by Physician and understands

## 2018-08-27 ENCOUNTER — ANESTHESIA EVENT (OUTPATIENT)
Dept: PERIOP | Facility: HOSPITAL | Age: 58
End: 2018-08-27
Payer: MEDICARE

## 2018-08-28 ENCOUNTER — APPOINTMENT (OUTPATIENT)
Dept: RADIOLOGY | Facility: HOSPITAL | Age: 58
End: 2018-08-28
Payer: MEDICARE

## 2018-08-28 ENCOUNTER — HOSPITAL ENCOUNTER (OUTPATIENT)
Facility: HOSPITAL | Age: 58
Setting detail: OUTPATIENT SURGERY
Discharge: HOME | End: 2018-08-28
Attending: ORTHOPAEDIC SURGERY | Admitting: ORTHOPAEDIC SURGERY
Payer: MEDICARE

## 2018-08-28 ENCOUNTER — ANESTHESIA (OUTPATIENT)
Dept: PERIOP | Facility: HOSPITAL | Age: 58
End: 2018-08-28
Payer: MEDICARE

## 2018-08-28 VITALS
HEART RATE: 71 BPM | BODY MASS INDEX: 27.82 KG/M2 | DIASTOLIC BLOOD PRESSURE: 65 MMHG | WEIGHT: 187.83 LBS | RESPIRATION RATE: 19 BRPM | TEMPERATURE: 98.8 F | OXYGEN SATURATION: 96 % | SYSTOLIC BLOOD PRESSURE: 113 MMHG | HEIGHT: 69 IN

## 2018-08-28 DIAGNOSIS — M19.049 CMC ARTHRITIS: Primary | ICD-10-CM

## 2018-08-28 PROCEDURE — 25447 ARTHRP NTRCRP/CRP/MTCR NTRPS: CPT | Performed by: ORTHOPAEDIC SURGERY

## 2018-08-28 PROCEDURE — 73120 X-RAY EXAM OF HAND: CPT

## 2018-08-28 PROCEDURE — 29848 WRIST ENDOSCOPY/SURGERY: CPT | Performed by: ORTHOPAEDIC SURGERY

## 2018-08-28 PROCEDURE — C1713 ANCHOR/SCREW BN/BN,TIS/BN: HCPCS | Performed by: ORTHOPAEDIC SURGERY

## 2018-08-28 RX ORDER — MIDAZOLAM HYDROCHLORIDE 1 MG/ML
INJECTION INTRAMUSCULAR; INTRAVENOUS AS NEEDED
Status: DISCONTINUED | OUTPATIENT
Start: 2018-08-28 | End: 2018-08-28 | Stop reason: SURG

## 2018-08-28 RX ORDER — ONDANSETRON 2 MG/ML
4 INJECTION INTRAMUSCULAR; INTRAVENOUS EVERY 6 HOURS PRN
Status: DISCONTINUED | OUTPATIENT
Start: 2018-08-28 | End: 2018-08-28 | Stop reason: HOSPADM

## 2018-08-28 RX ORDER — SODIUM CHLORIDE, SODIUM LACTATE, POTASSIUM CHLORIDE, CALCIUM CHLORIDE 600; 310; 30; 20 MG/100ML; MG/100ML; MG/100ML; MG/100ML
INJECTION, SOLUTION INTRAVENOUS CONTINUOUS PRN
Status: DISCONTINUED | OUTPATIENT
Start: 2018-08-28 | End: 2018-08-28 | Stop reason: SURG

## 2018-08-28 RX ORDER — IBUPROFEN 600 MG/1
600 TABLET ORAL EVERY 8 HOURS
Qty: 30 TABLET | Refills: 0 | Status: SHIPPED | OUTPATIENT
Start: 2018-08-28 | End: 2018-09-06

## 2018-08-28 RX ORDER — OXYCODONE HYDROCHLORIDE AND ACETAMINOPHEN 5; 325 MG/1; MG/1
1 TABLET ORAL EVERY 4 HOURS PRN
Status: DISCONTINUED | OUTPATIENT
Start: 2018-08-28 | End: 2018-08-28 | Stop reason: HOSPADM

## 2018-08-28 RX ORDER — MAGNESIUM HYDROXIDE 1200 MG/15ML
LIQUID ORAL AS NEEDED
Status: DISCONTINUED | OUTPATIENT
Start: 2018-08-28 | End: 2018-08-28 | Stop reason: HOSPADM

## 2018-08-28 RX ORDER — GLYCOPYRROLATE 0.2 MG/ML
INJECTION INTRAMUSCULAR; INTRAVENOUS AS NEEDED
Status: DISCONTINUED | OUTPATIENT
Start: 2018-08-28 | End: 2018-08-28 | Stop reason: SURG

## 2018-08-28 RX ORDER — FENTANYL CITRATE 50 UG/ML
INJECTION, SOLUTION INTRAMUSCULAR; INTRAVENOUS AS NEEDED
Status: DISCONTINUED | OUTPATIENT
Start: 2018-08-28 | End: 2018-08-28 | Stop reason: SURG

## 2018-08-28 RX ORDER — SODIUM CHLORIDE, SODIUM LACTATE, POTASSIUM CHLORIDE, CALCIUM CHLORIDE 600; 310; 30; 20 MG/100ML; MG/100ML; MG/100ML; MG/100ML
100 INJECTION, SOLUTION INTRAVENOUS CONTINUOUS
Status: DISCONTINUED | OUTPATIENT
Start: 2018-08-28 | End: 2018-08-28 | Stop reason: HOSPADM

## 2018-08-28 RX ORDER — ONDANSETRON 2 MG/ML
INJECTION INTRAMUSCULAR; INTRAVENOUS AS NEEDED
Status: DISCONTINUED | OUTPATIENT
Start: 2018-08-28 | End: 2018-08-28 | Stop reason: SURG

## 2018-08-28 RX ORDER — LIDOCAINE HYDROCHLORIDE 10 MG/ML
INJECTION, SOLUTION INFILTRATION; PERINEURAL AS NEEDED
Status: DISCONTINUED | OUTPATIENT
Start: 2018-08-28 | End: 2018-08-28 | Stop reason: SURG

## 2018-08-28 RX ORDER — PROPOFOL 10 MG/ML
INJECTION, EMULSION INTRAVENOUS AS NEEDED
Status: DISCONTINUED | OUTPATIENT
Start: 2018-08-28 | End: 2018-08-28 | Stop reason: SURG

## 2018-08-28 RX ORDER — PROMETHAZINE HYDROCHLORIDE 25 MG/ML
12.5 INJECTION, SOLUTION INTRAMUSCULAR; INTRAVENOUS ONCE AS NEEDED
Status: DISCONTINUED | OUTPATIENT
Start: 2018-08-28 | End: 2018-08-28 | Stop reason: HOSPADM

## 2018-08-28 RX ORDER — SODIUM CHLORIDE, SODIUM LACTATE, POTASSIUM CHLORIDE, CALCIUM CHLORIDE 600; 310; 30; 20 MG/100ML; MG/100ML; MG/100ML; MG/100ML
50 INJECTION, SOLUTION INTRAVENOUS CONTINUOUS
Status: DISCONTINUED | OUTPATIENT
Start: 2018-08-28 | End: 2018-08-28 | Stop reason: HOSPADM

## 2018-08-28 RX ADMIN — MIDAZOLAM HYDROCHLORIDE 2 MG: 1 INJECTION, SOLUTION INTRAMUSCULAR; INTRAVENOUS at 16:27

## 2018-08-28 RX ADMIN — SODIUM CHLORIDE, SODIUM LACTATE, POTASSIUM CHLORIDE, AND CALCIUM CHLORIDE 50 ML/HR: .6; .31; .03; .02 INJECTION, SOLUTION INTRAVENOUS at 14:12

## 2018-08-28 RX ADMIN — Medication 30 MG: at 16:42

## 2018-08-28 RX ADMIN — GLYCOPYRROLATE 0.1 MG: 0.2 INJECTION, SOLUTION INTRAMUSCULAR; INTRAVENOUS at 16:34

## 2018-08-28 RX ADMIN — LIDOCAINE HYDROCHLORIDE 50 MG: 10 INJECTION, SOLUTION INFILTRATION; PERINEURAL at 16:31

## 2018-08-28 RX ADMIN — PROPOFOL 200 MG: 10 INJECTION, EMULSION INTRAVENOUS at 16:31

## 2018-08-28 RX ADMIN — CEFAZOLIN SODIUM 2000 MG: 2 SOLUTION INTRAVENOUS at 16:29

## 2018-08-28 RX ADMIN — HYDROMORPHONE HYDROCHLORIDE 0.5 MG: 1 INJECTION, SOLUTION INTRAMUSCULAR; INTRAVENOUS; SUBCUTANEOUS at 14:12

## 2018-08-28 RX ADMIN — ONDANSETRON 4 MG: 2 INJECTION INTRAMUSCULAR; INTRAVENOUS at 16:36

## 2018-08-28 RX ADMIN — FENTANYL CITRATE 25 MCG: 50 INJECTION, SOLUTION INTRAMUSCULAR; INTRAVENOUS at 16:32

## 2018-08-28 RX ADMIN — OXYCODONE HYDROCHLORIDE AND ACETAMINOPHEN 1 TABLET: 5; 325 TABLET ORAL at 18:21

## 2018-08-28 RX ADMIN — SODIUM CHLORIDE, SODIUM LACTATE, POTASSIUM CHLORIDE, AND CALCIUM CHLORIDE: .6; .31; .03; .02 INJECTION, SOLUTION INTRAVENOUS at 16:27

## 2018-08-28 NOTE — ANESTHESIA PREPROCEDURE EVALUATION
Review of Systems/Medical History    Chart reviewed  No history of anesthetic complications     Cardiovascular  Exercise tolerance (METS): >4,  Hypertension controlled,    Pulmonary  Negative pulmonary ROS        GI/Hepatic  Negative GI/hepatic ROS   No GERD ,        Negative  ROS        Endo/Other  Negative endo/other ROS      GYN  Negative gynecology ROS          Hematology  Negative hematology ROS      Musculoskeletal    Comment: Chronic pain  Refused regional anesthesia  Neurology      Comment: H/O head injury Psychology   Depression ,              Physical Exam    Airway    Mallampati score: I  TM Distance: >3 FB  Neck ROM: full     Dental       Cardiovascular  Rhythm: regular, Rate: normal,     Pulmonary  Breath sounds clear to auscultation,     Other Findings        Anesthesia Plan  ASA Score- 2     Anesthesia Type- general with ASA Monitors  Additional Monitors:   Airway Plan: LMA  Plan Factors- Patient instructed to abstain from smoking on day of procedure  Patient did not smoke on day of surgery  Induction- intravenous  Postoperative Plan- Plan for postoperative opioid use  Informed Consent- Anesthetic plan and risks discussed with patient  I personally reviewed this patient with the CRNA  Discussed and agreed on the Anesthesia Plan with the CRNA  Bonnie Strauss

## 2018-08-28 NOTE — OP NOTE
PATIENT NAME: Kendra Rushing    MEDICAL RECORD NO:  [id number]    PROCEDURE DATE:  18    :  1960    SURGEON:  NEIL Smith , Ph D     Baudilio Miller: Basia Martinez DIAGNOSIS:   1  Failed right revision thumb CMC arthroplasty  2  right carpal tunnel syndrome      POSTOPERATIVE DIAGNOSIS:   1  Failed right revision thumb CMC arthroplasty  2  right carpal tunnel syndrome      PROCEDURE PERFORMED:  1  Revision right thumb CMC suspension plasty  2  right endoscopic carpal tunnel release     ANESTHESIA:  general anesthesia    COMPLICATIONS:   none    TOURNIQUET TIME: 45 minutes at 250 mmHg on the right    DISPOSITION: Patient was sent to the PACU in stable condition  INDICATION:  The patient is an 66 y o  male with clinical and electrodiagnostic evidence of recurrent right carpal tunnel syndrome and failed right thumb CMC suspension plasty x2  The patient had failed non-operative management for the recurrent carpal tunnel and opted for carpal tunnel revision release  Patient also opted for a 2nd revision of his right thumb CMC suspension plasty  After informing the patient of the risks and benefits of revision endoscopic carpal tunnel release and revision right thumb CMC suspension plasty, consent was obtained for surgical intervention  PROCEDURE: The patient was identified in the preoperative screening area  The consent form was signed and verified after identifying the correct operative site  The patient was taken to the operating room and underwent general anesthesia  The right upper extremity was then prepped and draped in normal sterile fashion with Chlorhexidine solution  The right arm was then elevated, exsanguinated with an Esmarch and the tourniquet placed about the brachium was insufflated to 250 mmHg  The Esmarch was removed       Endoscopic carpal tunnel was attended to 1st   A transverse incision, approximately 1 cm in length, was made just proximal to the distal wrist crease and just ulnar to the median nerve in the area of the previous well-healed incision site  The subcutaneous tissue was dissected bluntly down to the level of the forearm fascia  A transverse split in the fascia was created by gently piercing the fascia with the tips of tenotomy scissors  The proximal portion of the fascia was released longitudinally into the forearm using tenotomy scissors  The distal fascia was left intact for insertion of dilators  The carpal canal was then dilated using sequentially increasing sized dilators  Once the canal was adequately dilated, the scope and canula tray were then introduced into the carpal canal  The transverse carpal ligament was covered with a thin layer of synovial tissue on its undersurface  The synovial layer was debrided to expose the transverse fibers and the distal edge of the ligament  Once clear visualization of the transverse carpal ligament was obtained, the knife blade was introduced into the canula tray and the ligament was then released from distal to proximal maintaining complete visualization throughout the procedure  Several passes of the knife blade were necessary in some areas to complete the release  Complete release was verified with the endoscopic camera in place clearly visualizing the  cut edges of the transverse carpal ligament with the overlying volar fatty tissue and palmaris brevis muscle fibers protruding through  The transverse carpal ligament was moderately thickended  The scope and cannula were then removed and the wound was irrigated with copious amounts of saline solution  Next attention was turned towards the right thumb  A dorsal incision was made in the previous well-healed incision site at the level of the base of the thumb metacarpal   Skin flaps were elevated  The dissection was taken down between the palpated abductor pollicis longus and the scar tissue ulnarly    Care was taken to identify and protect the radial artery at this level  Once the capsule was incised a small amount of fluid from the local oozed from the trapezial void  Light distraction of the thumb metacarpal was performed while pulling distally on the thumb  Distraction was moderate but further distraction was obtained by placing a laminar  between the base of the thumb metacarpal and the distal edge of the scaphoid  Small amount of fibrous tissue was interposed between the 2 bone ends filling the trapezial void very scantly  This tissue was disturbed to identify the endo-button within the trapezial void  The Endobutton was removed  With the base of the thumb metacarpal suspended and verified under fluoroscopy to be suspended at the correct level of the base of the 2nd metacarpal, the thumb was then pinned to the base of the 2nd metacarpal with 2065 K-wires  With fluoroscopic visualization of the K-wires in correct position the laminar  was then removed  The trapezial void remained patent with excellent suspension of the thumb metacarpal and was verified on fluoroscopy  The wound was then irrigated with copious amounts saline solution  The tourniquet was released at 45 minutes with good capillary refill and color in the digits  Hemostasis was achieved  The small incisions were then repaired using #4-0 nylon sutures placed in an interrupted horizontal mattress fashion  The patient tolerated the procedure well  The incisions were dressed in a sterile soft bandage and the right upper extremity was then placed into a short-arm thumb spica splint  There were no complications during the case  The patient was sent to the PACU in stable condition            Tania Faustin MD  08/28/18  5:39 PM

## 2018-08-28 NOTE — H&P (VIEW-ONLY)
CHIEF COMPLAINT:  Chief Complaint   Patient presents with    Right Wrist - Follow-up       SUBJECTIVE:    Vanessa Peres is a 66y o  year old LHD male who presents to the office with right thumb pain  Pt has been treated for this in the past by Dr Jessica Duran  Pt states that he has had multiple surgeries to his right hand and wrist including CTR and multiple CMC surgeries   Pt states that his pain has increased since his last visit  Pt is wearing thumb spica wrist brace due to the pain  Pt is taking percocet 10/325 for pain  Pt states that he also takes tylenol for more mild pain  Pt states that his pain is currently constant  in his his wrist and thumb and radiates up his arm  Pt states that he has numbness and tingling  in his right index, small, ring and long fingers   Pt states that he now has locking in his right index long and ring fingers         PAST MEDICAL HISTORY:  Past Medical History:   Diagnosis Date    Depression     Fractures     Head injury        PAST SURGICAL HISTORY:  Past Surgical History:   Procedure Laterality Date    HAND SURGERY      KNEE SURGERY      WRIST SURGERY         FAMILY HISTORY:  Family History   Problem Relation Age of Onset    Stroke Brother     Heart attack Brother     Colon cancer Mother     Heart disease Father     Lung cancer Father        SOCIAL HISTORY:  Social History   Substance Use Topics    Smoking status: Never Smoker    Smokeless tobacco: Never Used    Alcohol use No       MEDICATIONS:    Current Outpatient Prescriptions:     BYSTOLIC 20 MG tablet, , Disp: , Rfl:     clonazePAM (KlonoPIN) 1 mg tablet, , Disp: , Rfl:     lansoprazole (PREVACID) 30 mg capsule, lansoprazole 30 mg capsule,delayed release, Disp: , Rfl:     metoprolol succinate (TOPROL-XL) 100 mg 24 hr tablet, metoprolol succinate  mg tablet,extended release 24 hr, Disp: , Rfl:     omega-3-acid ethyl esters (LOVAZA) 1 g capsule, omega-3 acid ethyl esters 1 gram capsule, Disp: , Rfl:     oxyCODONE-acetaminophen (PERCOCET)  mg per tablet, oxycodone-acetaminophen 10 mg-325 mg tablet, Disp: , Rfl:     QUEtiapine (SEROquel) 300 mg tablet, quetiapine 300 mg tablet, Disp: , Rfl:     simvastatin (ZOCOR) 40 mg tablet, simvastatin 40 mg tablet, Disp: , Rfl:     valsartan-hydrochlorothiazide (DIOVAN-HCT) 160-12 5 MG per tablet, , Disp: , Rfl:     zolpidem (AMBIEN CR) 12 5 MG CR tablet, zolpidem ER 12 5 mg tablet,extended release,multiphase, Disp: , Rfl:     ALLERGIES:  Allergies   Allergen Reactions    Other        REVIEW OF SYSTEMS:  Review of Systems   Constitutional: Negative for chills, fever and unexpected weight change  HENT: Negative for hearing loss, nosebleeds and sore throat  Eyes: Negative for pain, redness and visual disturbance  Respiratory: Negative for cough, shortness of breath and wheezing  Cardiovascular: Negative for chest pain, palpitations and leg swelling  Gastrointestinal: Negative for abdominal pain, nausea and vomiting  Endocrine: Negative for polydipsia and polyuria  Genitourinary: Negative for dysuria and hematuria  Musculoskeletal: Positive for arthralgias, joint swelling and myalgias  Skin: Negative for rash and wound  Neurological: Negative for light-headedness, numbness and headaches  Psychiatric/Behavioral: Positive for decreased concentration  Negative for dysphoric mood and suicidal ideas  The patient is nervous/anxious          VITALS:  Vitals:    07/30/18 1445   BP: 114/74   Pulse: 68       LABS:  HgA1c: No results found for: HGBA1C  BMP: No results found for: GLUCOSE, CALCIUM, NA, K, CO2, CL, BUN, CREATININE    _____________________________________________________  PHYSICAL EXAMINATION:  General: well developed and well nourished, alert, oriented times 3 and appears comfortable  Psychiatric: Normal  HEENT: Trachea Midline, No torticollis  Pulmonary: No audible wheezing or respiratory distress   Skin: No masses, erthema, lacerations, fluctation, ulcerations  Neurovascular: Sensation intact to the Ulnar Nerve, Sensation Intact to the Radial Nerve, Motor Intact to the Median, Ulnar, Radial Nerve and Pulses Intact    MUSCULOSKELETAL EXAMINATION:  Right Carpal Tunnel Exam:    Negative thenar atrophy  Positive phalen's test  Positive carpal tunnel compression  Positive tinels over median nerve at the wrist   Opposition strength 5/5  Abduction strength 5/5  CMC Exam:  No adduction contracture  Thumb MCP joint with solid arthrodesis  Positive localized tenderness over radial and dorsal aspect of thumb (CMC joint)  Grind test is Positive for pain    Trigger finger:  Right long, index, finger:  Positive palpable nodule over the A1 pulley  Positive tenderness to palpation over A1 pulley  Negative catching  Positive clicking only with direct pressure over A1 pulley          ___________________________________________________  STUDIES REVIEWED:  I personally reviewed the following images of EMG RUE and my interpretation is right carpal tunnel       PROCEDURES PERFORMED:  Hand/upper extremity injection  Date/Time: 7/30/2018 4:03 PM  Consent given by: patient  Site marked: site marked  Timeout: Immediately prior to procedure a time out was called to verify the correct patient, procedure, equipment, support staff and site/side marked as required   Supporting Documentation  Indications: pain   Procedure Details  Condition:trigger finger Location: index finger - R index A1   Needle size: 25 G  Ultrasound guidance: no  Approach: volar  Medications administered: 0 5 mL lidocaine 1 %; 20 mg triamcinolone acetonide 40 mg/mL  Patient tolerance: patient tolerated the procedure well with no immediate complications  Dressing:  Sterile dressing applied   Hand/upper extremity injection  Date/Time: 7/30/2018 4:03 PM  Consent given by: patient  Site marked: site marked  Timeout: Immediately prior to procedure a time out was called to verify the correct patient, procedure, equipment, support staff and site/side marked as required   Supporting Documentation  Indications: pain   Procedure Details  Condition:trigger finger Location: long finger - R long A1   Needle size: 25 G  Ultrasound guidance: no  Approach: volar  Medications administered: 0 5 mL lidocaine 1 %; 20 mg triamcinolone acetonide 40 mg/mL  Patient tolerance: patient tolerated the procedure well with no immediate complications  Dressing:  Sterile dressing applied            _____________________________________________________  ASSESSMENT/PLAN:    Assessment:   Right thumb pain and failed CMC suspensionplasty X 2  right Carpal Tunnel Syndrome    Plan:   Surgical procedures were discussed for both conditions above  We discussed ECTR and revision suspensioplasty of the right wrist and thumb CMC joint  See discussion below  The patient is already on chronic pain medications with his PCP  We will only prescribe Ibuprofen for after surgery since his pain medication also includes tylenol  Diagnoses and all orders for this visit:    Carpal tunnel syndrome of right wrist  -     Case request operating room: Right RELEASE CARPAL TUNNEL ENDOSCOPIC, Right suspensionplasty thumb cmc; Standing  -     Case request operating room: Right RELEASE CARPAL TUNNEL ENDOSCOPIC, Right suspensionplasty thumb cmc  -     Ambulatory referral to PT/OT hand therapy; Future    CMC arthritis  -     Case request operating room: Right RELEASE CARPAL TUNNEL ENDOSCOPIC, Right suspensionplasty thumb cmc; Standing  -     Case request operating room: Right RELEASE CARPAL TUNNEL ENDOSCOPIC, Right suspensionplasty thumb cmc  -     Ambulatory referral to PT/OT hand therapy; Future    Trigger index finger of right hand  -     Hand/upper extremity injection    Trigger middle finger of right hand  -     Hand/upper extremity injection    Other orders  -     Discontinue: celecoxib (CeleBREX) 200 mg capsule;  Take 1 capsule every day by oral route as needed  -     clonazePAM (KlonoPIN) 1 mg tablet;   -     BYSTOLIC 20 MG tablet;   -     valsartan-hydrochlorothiazide (DIOVAN-HCT) 160-12 5 MG per tablet;   -     Void on call to OR; Standing  -     Insert peripheral IV; Standing  -     Diet NPO; Sips with meds; Standing            Follow Up:  Return for after surgery   To Do Next Visit:  Re-evaluation of current issue, Splint off and Sutures out    General Discussions:  Trigger Finger: The anatomy and physiology of trigger finger was discussed with the patient today in the office  Edema and increased contact pressure within the flexor tendons at the A1 pulley can cause pain, crepitation, and triggering or locking of the digit resulting in limitation of function  Symptoms can occur at anytime but are typically worse in the morning or after a brief rest from repetitive activity  Treatment options include resting/nighttime MP blocking splints to decrease edema, oral anti-inflammatory medications, home or formal therapy exercises, up to 2 steroid injections within the tendon sheath, or surgical release  While majority of patients do respond to conservative treatment, up to 20% may require surgical release  Operative Discussions:  Endoscopic Carpal Tunnel Release: The anatomy and physiology of carpal tunnel syndrome was discussed with the patient today  Increase pressure localized under the transverse carpal ligament can cause pain, numbness, tingling, or dysesthesias within the median nerve distribution as well as feelings of fatigue, clumsiness, or awkwardness  These symptoms typically occur at night and worse in the morning upon waking  Eventually, untreated carpal tunnel syndrome can result in weakness and permanent loss of muscle within the thenar compartment of the hand  Treatment options were discussed with the patient    Conservative treatment includes nocturnal resting splints to keep the nerve in a neutral position, ergonomic changes within the work or home environment, activity modification, and tendon gliding exercises  Vitamin B6 one tablet daily over the counter may helpful to reduce symptoms  Steroid injections within the carpal canal can help a majority of patients, however this is often self-limited in a majority of patients  Surgical intervention to divide the transverse carpal ligament typically results in a long-lasting relief of the patient's complaints, with the recurrence rate of less than 1%  The patient has elected to undergo an endoscopic carpal tunnel release  The single incision technique was discussed with the patient, which results in approximately a two-week recovery time less wound complications  In the postoperative period, light activities are allowed immediately, driving is allowed when narcotic medication has stopped, and the bandages may be removed and incision may get wet after 2 days  Heavy activities (lifting more than approximately 10 pounds) will be allowed after follow up appointment in 1-2 weeks  While night symptoms (waking from sleep, pain, and discomfort in the hands) generally improves rapidly, the numbness and tingling as well as the strength will slowly improve over weeks to months depending on the chronicity and severity of the carpal tunnel syndrome  Pillar pain and scar discomfort were discussed with the patient which are self-limiting conditions  The risks of bleeding and infection from the surgery are less than 1%  Risk of recurrence is approximately 0 5%  The risks of nerve injury or nerve damage or damage to the blood vessels is approximately 1 in 1200  The patient has an understanding of the above mentioned discussion  The risks and benefits of the procedure were explained to the patient, which include, but are not limited to: Bleeding, infection, recurrence, pain, scar, damage to tendons, damage to nerves, and damage to blood vessels, failure to give desired results and complications related to anesthesia  These risks, along with alternative conservative treatment options, and postoperative protocols were voiced back and understood by the patient  All questions were answered to the patient's satisfaction  The patient agrees to comply with a standard postoperative protocol, and is willing to proceed  Education was provided via written and auditory forms  There were no barriers to learning  Written handouts regarding wound care, incision and scar care, and general preoperative information was provided to the patient  Prior to surgery, the patient may be requested to stop all anti-inflammatory medications  Prophylactic aspirin, Plavix, and Coumadin may be allowed to be continued  Medications including vitamin E , ginkgo, and fish oil are requested to be stopped approximately one week prior to surgery    Thumb CMC suspensionplasty revision - we discussed the procedure of revision suspensionplasty which will include anchors and suture tape and/or K-wire fixation  This will be his third operation at this joint and we discussed the possibility of this being his last   If this fails, the only other alternative is arthrodesis of thumb MC to Index MC, however, given he already has an arthrodesis at the MP joint, I told him this is a poor function alternative  Patient fully understands the risks and benefits of the procedure      Scribe Attestation    I,:   Veronique Varela am acting as a scribe while in the presence of the attending physician :        I,:   Margi Ballesteros MD personally performed the services described in this documentation    as scribed in my presence :

## 2018-08-28 NOTE — INTERVAL H&P NOTE
H&P reviewed  After examining the patient I find no changes in the patients condition since the H&P had been written  Cardiac:  Regular rate and rhythm  Pulmonary: No respiratory distress  Lung sounds clear to auscultation

## 2018-08-28 NOTE — DISCHARGE INSTRUCTIONS
Post Operative Instructions    You have had surgery on your arm today, please read and follow the information below:  · Elevate your hand above your elbow during the next 24-48 hours to help with swelling  · Place your hand and arm over your head with motion at your shoulder three times a day  · Do not apply any cream/ointment/oil to your incisions including antibiotics  · Do not soak your hands in standing water (dishwater, tubs, Jacuzzi's, pools, etc ) until given permission (typically 2-3 weeks after injury)    Call the office if you notice any:  · Increased numbness or tingling of your hand or fingers that is not relieved with elevation  · Increasing pain that is not controlled with medication  · Difficulty chewing, breathing, swallowing  · Chest pains or shortness of breath  · Fever over 101 4 degrees  Bandage: Your therapist will remove your bandage at your first therapy appointment  Motion: Move fingers into a fist 5 times a day, DO NOT move any splinted fingers  Weight bearing status: The operated extremity should be non-weight bearing until further notice  Ice: Ice for 10 minutes every hour as needed for swelling x 24 hours  Sling: No sling necessary  Pain medication:   A script for Ibuprofen was given  The patient takes chronic pain medications from another provider and should continue those  No narcotics will be given from our office since he is on chronic pain medications from his other provider  Follow-up Appointment: 7-10 days with Dr Juhi Garcia  Occupational Therapy:   8/31/2018 9:15 AM Clarita Heller OT Physical Therapy at [address] [place] [address]           Please call the office if you have any questions or concerns regarding your post-operative care

## 2018-08-28 NOTE — ANESTHESIA POSTPROCEDURE EVALUATION
Post-Op Assessment Note      CV Status:  Stable    Mental Status:  Alert and awake    Hydration Status:  Euvolemic    PONV Controlled:  Controlled    Airway Patency:  Patent    Post Op Vitals Reviewed: Yes          Staff: CRNA, Anesthesiologist           BP      Temp (P) 97 5 °F (36 4 °C) (08/28/18 1735)    Pulse (P) 74 (08/28/18 1735)   Resp (P) 18 (08/28/18 1735)    SpO2   98

## 2018-08-29 ENCOUNTER — TELEPHONE (OUTPATIENT)
Dept: OBGYN CLINIC | Facility: HOSPITAL | Age: 58
End: 2018-08-29

## 2018-08-29 NOTE — TELEPHONE ENCOUNTER
Dr Haydee Dandy    Patient was having issues with his hand  The dressing was too tight  He states that his hand was numb and turning blue  He was nervous and tried to get in touch and he states that he transferred around and people hung up on him  He loosened the dressing lightly and the numbness and blueness went away  He is upset as he was trying to get help and was getting no where  He is asking for a call back from you ASAP

## 2018-08-30 NOTE — TELEPHONE ENCOUNTER
Please call and see how he is doing today  He should have an OT appointment today or tomorrow for a dressing change and a splint will be made  If he is having issues, he can come into the clinic today for a wound check

## 2018-09-04 ENCOUNTER — EVALUATION (OUTPATIENT)
Dept: OCCUPATIONAL THERAPY | Facility: CLINIC | Age: 58
End: 2018-09-04
Payer: MEDICARE

## 2018-09-04 DIAGNOSIS — M19.049 CMC ARTHRITIS: ICD-10-CM

## 2018-09-04 DIAGNOSIS — M18.11 ARTHRITIS OF CARPOMETACARPAL (CMC) JOINT OF RIGHT THUMB: ICD-10-CM

## 2018-09-04 DIAGNOSIS — G56.01 CARPAL TUNNEL SYNDROME OF RIGHT WRIST: ICD-10-CM

## 2018-09-04 DIAGNOSIS — G56.01 CARPAL TUNNEL SYNDROME, RIGHT: Primary | ICD-10-CM

## 2018-09-04 PROCEDURE — G8984 CARRY CURRENT STATUS: HCPCS

## 2018-09-04 PROCEDURE — G8985 CARRY GOAL STATUS: HCPCS

## 2018-09-04 PROCEDURE — L3906 WHO W/O JOINTS CF: HCPCS

## 2018-09-04 PROCEDURE — 97165 OT EVAL LOW COMPLEX 30 MIN: CPT
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
## 2018-09-06 ENCOUNTER — OFFICE VISIT (OUTPATIENT)
Dept: OBGYN CLINIC | Facility: CLINIC | Age: 58
End: 2018-09-06

## 2018-09-06 ENCOUNTER — APPOINTMENT (OUTPATIENT)
Dept: RADIOLOGY | Facility: CLINIC | Age: 58
End: 2018-09-06
Payer: MEDICARE

## 2018-09-06 ENCOUNTER — EVALUATION (OUTPATIENT)
Dept: OCCUPATIONAL THERAPY | Facility: CLINIC | Age: 58
End: 2018-09-06
Payer: MEDICARE

## 2018-09-06 VITALS
SYSTOLIC BLOOD PRESSURE: 149 MMHG | HEIGHT: 69 IN | HEART RATE: 73 BPM | WEIGHT: 200 LBS | DIASTOLIC BLOOD PRESSURE: 93 MMHG | BODY MASS INDEX: 29.62 KG/M2

## 2018-09-06 DIAGNOSIS — Z48.89 AFTERCARE FOLLOWING SURGERY: ICD-10-CM

## 2018-09-06 DIAGNOSIS — M25.531 RIGHT WRIST PAIN: ICD-10-CM

## 2018-09-06 DIAGNOSIS — G56.01 CARPAL TUNNEL SYNDROME, RIGHT: Primary | ICD-10-CM

## 2018-09-06 DIAGNOSIS — M18.11 ARTHRITIS OF CARPOMETACARPAL (CMC) JOINT OF RIGHT THUMB: ICD-10-CM

## 2018-09-06 DIAGNOSIS — M25.531 RIGHT WRIST PAIN: Primary | ICD-10-CM

## 2018-09-06 PROCEDURE — 99024 POSTOP FOLLOW-UP VISIT: CPT | Performed by: ORTHOPAEDIC SURGERY

## 2018-09-06 PROCEDURE — 73110 X-RAY EXAM OF WRIST: CPT

## 2018-09-06 RX ORDER — CEPHALEXIN 500 MG/1
500 CAPSULE ORAL EVERY 6 HOURS SCHEDULED
Qty: 40 CAPSULE | Refills: 0 | Status: SHIPPED | OUTPATIENT
Start: 2018-09-06 | End: 2018-09-16

## 2018-09-06 RX ORDER — IBUPROFEN 600 MG/1
600 TABLET ORAL EVERY 6 HOURS PRN
Qty: 30 TABLET | Refills: 0 | Status: SHIPPED | OUTPATIENT
Start: 2018-09-06 | End: 2018-09-12 | Stop reason: SDUPTHER

## 2018-09-06 NOTE — PROGRESS NOTES
SUBJECTIVE:  Celsa Gamboa is a 66y o  year old male who presents for follow up after surgery for revision of right thumb CMC suspension plasty and Right endoscopic CTR done on  8/28/2018  Today patient has minimal complaints of pain  Pt has been taking 600 mg prescription ibuprofen with full relief  Pt states that his thumb spica splint made at OT is too tight around his thumb and caused him pain  VITALS:  Vitals:    09/06/18 1434   BP: 149/93   Pulse: 73       PHYSICAL EXAMINATION:  General: well developed and well nourished, alert, oriented times 3 and appears comfortable  Psychiatric: Normal    MUSCULOSKELETAL EXAMINATION:  Right hand  Incision: small area of wound dehiscence at base of thumb incision (2mm) with scant clear drainage, incision from CTR is clean dry and intact  Range of Motion: Pt has motion at the [facility] joint despite surgical pinning of the joint, No significant discomfort    Neurovascular status: Neuro intact, good cap refill      STUDIES REVIEWED:  I personally reviewed the following images of xrays of left thumb and my interpretation is proximal migration of the thumb metacarpal      PROCEDURES PERFORMED:  Procedures  No Procedures performed today      ASSESSMENT/PLAN:    S/P revision of right thumb CMC suspension plasty  Xrays and exam suggest early failure of surgical thumb suspension  *Sutures were removed and steri strips were applied due to dehisced wound  *Pt was placed in a thumb spica cast  *Keflex was prescribed due to dehisced wound  *600 mg ibuprofen was prescribed       S/P endoscopic carpal tunnel release/ with resolution of symptoms  *Sutures were removed today without complications      FOLLOW UP:  Return for 2 weeks per pt request       TO DO AT NEXT VISIT:  Re-evaluation of current issue      Scribe Attestation    I,:   Caity Alcala am acting as a scribe while in the presence of the attending physician :        I,:   Emily Lee MD personally performed the services described in this documentation    as scribed in my presence :

## 2018-09-06 NOTE — PROGRESS NOTES
Daily Note     Today's date: 2018  Patient name: Corazon Holman  : 1960  MRN: [id number]  Referring provider: Seb Castaneda MD  Dx:   Encounter Diagnosis     ICD-10-CM    1  Carpal tunnel syndrome, right G56 01    2  Arthritis of carpometacarpal (CMC) joint of right thumb M18 11                   Subjective: The doctor said my thumb is too loose, so he put me in a cast      Objective: See treatment diary below  No treatment provided this date due to patient having been put in a thumb spica cast by Dr Noah Jesus this date      Assessment: No treatment provided  Patient seen earlier by Dr Noah Jesus  Thumb spica splint discontinued and patient put in a cast to provide more support and immobilization  Patient has full AROM in right digits 2-5      Plan: therapy on hold until cast removed and new orders received from Dr Tae Irvin

## 2018-09-12 DIAGNOSIS — Z48.89 AFTERCARE FOLLOWING SURGERY: ICD-10-CM

## 2018-09-13 RX ORDER — IBUPROFEN 600 MG/1
TABLET ORAL
Qty: 30 TABLET | Refills: 0 | Status: SHIPPED | OUTPATIENT
Start: 2018-09-13 | End: 2018-12-26 | Stop reason: SDUPTHER

## 2018-09-18 ENCOUNTER — OFFICE VISIT (OUTPATIENT)
Dept: OBGYN CLINIC | Facility: CLINIC | Age: 58
End: 2018-09-18

## 2018-09-18 VITALS
SYSTOLIC BLOOD PRESSURE: 148 MMHG | DIASTOLIC BLOOD PRESSURE: 91 MMHG | RESPIRATION RATE: 18 BRPM | HEART RATE: 87 BPM | WEIGHT: 203 LBS | HEIGHT: 69 IN | BODY MASS INDEX: 30.07 KG/M2

## 2018-09-18 DIAGNOSIS — G56.01 CARPAL TUNNEL SYNDROME OF RIGHT WRIST: ICD-10-CM

## 2018-09-18 DIAGNOSIS — M19.049 CMC ARTHRITIS: ICD-10-CM

## 2018-09-18 DIAGNOSIS — Z48.89 AFTERCARE FOLLOWING SURGERY: Primary | ICD-10-CM

## 2018-09-18 PROCEDURE — 99024 POSTOP FOLLOW-UP VISIT: CPT | Performed by: ORTHOPAEDIC SURGERY

## 2018-09-18 NOTE — PROGRESS NOTES
SUBJECTIVE:  Vanessa Peres is a 66y o  year old male who presents for follow up after surgery for right endoscopic carpal tunnel and right revision of CMC suspension plasty done on  8/28/2018  Today patient has no complaints of pain  Pt states he is doing well  VITALS:  Vitals:    09/18/18 1109   BP: 148/91   Pulse: 87   Resp: 18       PHYSICAL EXAMINATION:  General: well developed and well nourished, alert, oriented times 3 and appears comfortable  Psychiatric: Normal    MUSCULOSKELETAL EXAMINATION:  Right hand  Pt is in thumb spica cast  Range of Motion: not able to be tested   Neurovascular status: Neuro intact, good cap refill      STUDIES REVIEWED:  No studies reviewed  PROCEDURES PERFORMED:  Procedures  No Procedures performed today      ASSESSMENT/PLAN:    S/P right endoscopic carpal tunnel release with resolutions   *Pt will call the office if he has any questions or concerns    S/P revision of right thumb CMC suspension plasty  * Pt will retmain in thumb spica cast  * Pt will follow up in 3 weeks for cast removal xrays and pulling of pin      FOLLOW UP:  Return in about 3 weeks (around 10/9/2018)        TO DO AT NEXT VISIT:  Re-evaluation of current issue, X-rays of the  right  wrist, Cast off and Pins out      Scribe Attestation    I,:   Kike Cox am acting as a scribe while in the presence of the attending physician :        I,:   Sandi Butler MD personally performed the services described in this documentation    as scribed in my presence :

## 2018-10-11 ENCOUNTER — OFFICE VISIT (OUTPATIENT)
Dept: OBGYN CLINIC | Facility: CLINIC | Age: 58
End: 2018-10-11
Payer: MEDICARE

## 2018-10-11 ENCOUNTER — APPOINTMENT (OUTPATIENT)
Dept: RADIOLOGY | Facility: CLINIC | Age: 58
End: 2018-10-11
Payer: MEDICARE

## 2018-10-11 VITALS
HEIGHT: 69 IN | DIASTOLIC BLOOD PRESSURE: 96 MMHG | HEART RATE: 82 BPM | SYSTOLIC BLOOD PRESSURE: 152 MMHG | BODY MASS INDEX: 28.29 KG/M2 | WEIGHT: 191 LBS

## 2018-10-11 DIAGNOSIS — Z48.89 AFTERCARE FOLLOWING SURGERY: Primary | ICD-10-CM

## 2018-10-11 DIAGNOSIS — T84.84XA PAINFUL ORTHOPAEDIC HARDWARE (HCC): ICD-10-CM

## 2018-10-11 DIAGNOSIS — Z48.89 AFTERCARE FOLLOWING SURGERY: ICD-10-CM

## 2018-10-11 PROCEDURE — 73110 X-RAY EXAM OF WRIST: CPT

## 2018-10-11 PROCEDURE — 99213 OFFICE O/P EST LOW 20 MIN: CPT | Performed by: ORTHOPAEDIC SURGERY

## 2018-10-11 RX ORDER — CEPHALEXIN 500 MG/1
500 CAPSULE ORAL EVERY 12 HOURS SCHEDULED
Qty: 20 CAPSULE | Refills: 0 | Status: SHIPPED | OUTPATIENT
Start: 2018-10-11 | End: 2018-10-21

## 2018-10-11 RX ORDER — CEPHALEXIN 500 MG/1
500 CAPSULE ORAL EVERY 12 HOURS SCHEDULED
Qty: 20 CAPSULE | Refills: 0 | Status: CANCELLED | OUTPATIENT
Start: 2018-10-11 | End: 2018-10-21

## 2018-10-11 RX ADMIN — Medication 1 MEQ: at 16:31

## 2018-10-11 RX ADMIN — LIDOCAINE HYDROCHLORIDE 2 ML: 10 INJECTION, SOLUTION INFILTRATION; PERINEURAL at 16:31

## 2018-10-11 RX ADMIN — LIDOCAINE HYDROCHLORIDE 3 ML: 10 INJECTION, SOLUTION INFILTRATION; PERINEURAL at 16:31

## 2018-10-11 NOTE — PATIENT INSTRUCTIONS
Surgery medication instructions: You will stop eating and drinking at midnight the night before your surgery, but you may continue to take your normal medications with a small sip of water  In the morning on the day of your surgery, we would like you to take the following medications (as long as you have never been told to avoid Tylenol or NSAIDs like ibuprofen, Naproxen, Aleve, Advil, etc):   Ibuprofen 600mg one tablet by mouth   Tylenol 500mg one tablet by mouth    After surgery, we would like you to take Ibuprofen 600mg one tablet by mouth every 6 hours with food (at breakfast, lunch and dinner)  AND Tylenol 500 mg one tablet by mouth every 6 hours  (at breakfast, lunch and dinner) for 5-7 days after your surgery  Please take these medication EVERYDAY after surgery for 5-7 days, and not just as needed  You can take these medications at the same time  Taking these medications after surgery will limit your need for prescription pain medication  We will also prescribe a narcotic pain medication for a limited time after surgery that you can take as needed for moderate or severe pain

## 2018-10-11 NOTE — PROGRESS NOTES
SUBJECTIVE:  Dhruv Nava is a 66y o  year old male who presents for follow up after surgery for right endoscopic carpal tunnel and right revision of CMC suspension plasty done on  8/28/2018  Today patient has no complaints of pain  Patient has stiffness of her wrist due to cast placement  Here is here for k-wire removal       The patient denies any cardiac or pulmonary issues  Denies diabetes  Denies any history of MI, gastric ulcers, kidney or liver issues  No changes in his health since last surgery  VITALS:  Vitals:    10/11/18 1534   BP: 152/96   Pulse: 82       PHYSICAL EXAMINATION:  General: well developed and well nourished, alert, oriented times 3 and appears comfortable  Psychiatric: Normal  Cardiac:  Regular rate and rhythm  Pulmonary: No respiratory distress  Lung sounds clear to auscultation  MUSCULOSKELETAL EXAMINATION:  Right wrist  Ulcerative lesion at pin site  No signs of infection      STUDIES REVIEWED:  I personally reviewed the following images of 3v right wrist and my interpretation is Hardware intact, migration of 1 of 2 pins seen      PROCEDURES PERFORMED:  Right wrist local anesthesia  Date/Time: 10/11/2018 4:31 PM  Consent given by: patient  Supporting Documentation  Indications: local for kwire removal    Procedure Details  Medications administered: 1 mEq sodium bicarbonate 8 4 %; 3 mL lidocaine 1 %; 2 mL lidocaine 1 %  Patient tolerance: patient tolerated the procedure well with no immediate complications  Dressing:  Sterile dressing applied     One k-wire was removed  The other k-wire was too buried and I was unable to retrieve it in the office            ASSESSMENT/PLAN:    S/P revision of right thumb CMC suspension plasty  * 1 of 2 k-wires was removed today in the office  * The remaining k-wire will be scheduled to be removed in the OR  * Surgery: right wrist hardware removal  * Consent obtained  * Anesthesia: local with sedation  * Antibiotic: Oral Keflex - script given today  No intraoperative antibiotics needed  Surgery medication instructions: You will stop eating and drinking at midnight the night before your surgery, but you may continue to take your normal medications with a small sip of water  In the morning on the day of your surgery, we would like you to take the following medications (as long as you have never been told to avoid Tylenol or NSAIDs like ibuprofen, Naproxen, Aleve, Advil, etc):   Ibuprofen 600mg one tablet by mouth   Tylenol 500mg one tablet by mouth    After surgery, we would like you to take Ibuprofen 600mg one tablet by mouth every 6 hours with food (at breakfast, lunch and dinner)  AND Tylenol 500 mg one tablet by mouth every 6 hours  (at breakfast, lunch and dinner) for 5-7 days after your surgery  Please take these medication EVERYDAY after surgery for 5-7 days, and not just as needed  You can take these medications at the same time  Taking these medications after surgery will limit your need for prescription pain medication  We will also prescribe a narcotic pain medication for a limited time after surgery that you can take as needed for moderate or severe pain  FOLLOW UP:  Return for after surgery        TO DO AT NEXT VISIT:  Re-evaluation of current issue      Scribe Attestation    I,:   Kristine Pickens am acting as a scribe while in the presence of the attending physician :        I,:   Marilee Khan MD personally performed the services described in this documentation    as scribed in my presence :

## 2018-10-11 NOTE — H&P
SUBJECTIVE:  Bhavna Ruiz is a 66y o  year old male who presents for follow up after surgery for right endoscopic carpal tunnel and right revision of CMC suspension plasty done on  8/28/2018  Today patient has no complaints of pain  Patient has stiffness of her wrist due to cast placement  Here is here for k-wire removal       The patient denies any cardiac or pulmonary issues  Denies diabetes  Denies any history of MI, gastric ulcers, kidney or liver issues  No changes in his health since last surgery  VITALS:  Vitals:    10/11/18 1534   BP: 152/96   Pulse: 82       PHYSICAL EXAMINATION:  General: well developed and well nourished, alert, oriented times 3 and appears comfortable  Psychiatric: Normal  Cardiac:  Regular rate and rhythm  Pulmonary: No respiratory distress  Lung sounds clear to auscultation  MUSCULOSKELETAL EXAMINATION:  Right wrist  Ulcerative lesion at pin site  No signs of infection      STUDIES REVIEWED:  I personally reviewed the following images of 3v right wrist and my interpretation is Hardware intact, migration of 1 of 2 pins seen      PROCEDURES PERFORMED:  Right wrist local anesthesia  Date/Time: 10/11/2018 4:31 PM  Consent given by: patient  Supporting Documentation  Indications: local for kwire removal    Procedure Details  Medications administered: 1 mEq sodium bicarbonate 8 4 %; 3 mL lidocaine 1 %; 2 mL lidocaine 1 %  Patient tolerance: patient tolerated the procedure well with no immediate complications  Dressing:  Sterile dressing applied     One k-wire was removed  The other k-wire was too buried and I was unable to retrieve it in the office            ASSESSMENT/PLAN:    S/P revision of right thumb CMC suspension plasty  * 1 of 2 k-wires was removed today in the office  * The remaining k-wire will be scheduled to be removed in the OR  * Surgery: right wrist hardware removal  * Consent obtained  * Anesthesia: local with sedation  * Antibiotic: Oral Keflex - script given today  No intraoperative antibiotics needed  Surgery medication instructions: You will stop eating and drinking at midnight the night before your surgery, but you may continue to take your normal medications with a small sip of water  In the morning on the day of your surgery, we would like you to take the following medications (as long as you have never been told to avoid Tylenol or NSAIDs like ibuprofen, Naproxen, Aleve, Advil, etc):   Ibuprofen 600mg one tablet by mouth   Tylenol 500mg one tablet by mouth    After surgery, we would like you to take Ibuprofen 600mg one tablet by mouth every 6 hours with food (at breakfast, lunch and dinner)  AND Tylenol 500 mg one tablet by mouth every 6 hours  (at breakfast, lunch and dinner) for 5-7 days after your surgery  Please take these medication EVERYDAY after surgery for 5-7 days, and not just as needed  You can take these medications at the same time  Taking these medications after surgery will limit your need for prescription pain medication  We will also prescribe a narcotic pain medication for a limited time after surgery that you can take as needed for moderate or severe pain  FOLLOW UP:  Return for after surgery        TO DO AT NEXT VISIT:  Re-evaluation of current issue      Scribe Attestation    I,:   Jigar Mckeon am acting as a scribe while in the presence of the attending physician :        I,:   Boaz Romero MD personally performed the services described in this documentation    as scribed in my presence :

## 2018-10-12 RX ORDER — LIDOCAINE HYDROCHLORIDE 10 MG/ML
2 INJECTION, SOLUTION INFILTRATION; PERINEURAL
Status: COMPLETED | OUTPATIENT
Start: 2018-10-11 | End: 2018-10-11

## 2018-10-12 RX ORDER — LIDOCAINE HYDROCHLORIDE 10 MG/ML
3 INJECTION, SOLUTION INFILTRATION; PERINEURAL
Status: COMPLETED | OUTPATIENT
Start: 2018-10-11 | End: 2018-10-11

## 2018-10-13 ENCOUNTER — HOSPITAL ENCOUNTER (EMERGENCY)
Facility: HOSPITAL | Age: 58
Discharge: HOME | End: 2018-10-13
Attending: EMERGENCY MEDICINE
Payer: MEDICARE

## 2018-10-13 ENCOUNTER — APPOINTMENT (EMERGENCY)
Dept: RADIOLOGY | Facility: HOSPITAL | Age: 58
End: 2018-10-13
Payer: MEDICARE

## 2018-10-13 VITALS
DIASTOLIC BLOOD PRESSURE: 59 MMHG | RESPIRATION RATE: 18 BRPM | TEMPERATURE: 98.7 F | HEART RATE: 73 BPM | SYSTOLIC BLOOD PRESSURE: 101 MMHG | OXYGEN SATURATION: 93 %

## 2018-10-13 DIAGNOSIS — M79.89 HAND SWELLING: Primary | ICD-10-CM

## 2018-10-13 LAB
ANION GAP SERPL CALCULATED.3IONS-SCNC: 9 MMOL/L (ref 4–13)
BASOPHILS # BLD AUTO: 0.06 THOUSANDS/ΜL (ref 0–0.1)
BASOPHILS NFR BLD AUTO: 1 % (ref 0–1)
BUN SERPL-MCNC: 20 MG/DL (ref 5–25)
CALCIUM SERPL-MCNC: 8.9 MG/DL (ref 8.3–10.1)
CHLORIDE SERPL-SCNC: 94 MMOL/L (ref 100–108)
CO2 SERPL-SCNC: 26 MMOL/L (ref 21–32)
CREAT SERPL-MCNC: 0.92 MG/DL (ref 0.6–1.3)
EOSINOPHIL # BLD AUTO: 0.19 THOUSAND/ΜL (ref 0–0.61)
EOSINOPHIL NFR BLD AUTO: 2 % (ref 0–6)
ERYTHROCYTE [DISTWIDTH] IN BLOOD BY AUTOMATED COUNT: 12.3 % (ref 11.6–15.1)
GFR SERPL CREATININE-BSD FRML MDRD: 93 ML/MIN/1.73SQ M
GLUCOSE SERPL-MCNC: 117 MG/DL (ref 65–140)
HCT VFR BLD AUTO: 31.6 % (ref 36.5–49.3)
HGB BLD-MCNC: 10.8 G/DL (ref 12–17)
IMM GRANULOCYTES # BLD AUTO: 0.04 THOUSAND/UL (ref 0–0.2)
IMM GRANULOCYTES NFR BLD AUTO: 1 % (ref 0–2)
LYMPHOCYTES # BLD AUTO: 2.08 THOUSANDS/ΜL (ref 0.6–4.47)
LYMPHOCYTES NFR BLD AUTO: 25 % (ref 14–44)
MCH RBC QN AUTO: 30 PG (ref 26.8–34.3)
MCHC RBC AUTO-ENTMCNC: 34.2 G/DL (ref 31.4–37.4)
MCV RBC AUTO: 88 FL (ref 82–98)
MONOCYTES # BLD AUTO: 0.7 THOUSAND/ΜL (ref 0.17–1.22)
MONOCYTES NFR BLD AUTO: 9 % (ref 4–12)
NEUTROPHILS # BLD AUTO: 5.2 THOUSANDS/ΜL (ref 1.85–7.62)
NEUTS SEG NFR BLD AUTO: 62 % (ref 43–75)
NRBC BLD AUTO-RTO: 0 /100 WBCS
PLATELET # BLD AUTO: 292 THOUSANDS/UL (ref 149–390)
PMV BLD AUTO: 9 FL (ref 8.9–12.7)
POTASSIUM SERPL-SCNC: 4.5 MMOL/L (ref 3.5–5.3)
RBC # BLD AUTO: 3.6 MILLION/UL (ref 3.88–5.62)
SODIUM SERPL-SCNC: 129 MMOL/L (ref 136–145)
WBC # BLD AUTO: 8.27 THOUSAND/UL (ref 4.31–10.16)

## 2018-10-13 PROCEDURE — 99284 EMERGENCY DEPT VISIT MOD MDM: CPT

## 2018-10-13 PROCEDURE — 85025 COMPLETE CBC W/AUTO DIFF WBC: CPT | Performed by: EMERGENCY MEDICINE

## 2018-10-13 PROCEDURE — 80048 BASIC METABOLIC PNL TOTAL CA: CPT | Performed by: EMERGENCY MEDICINE

## 2018-10-13 PROCEDURE — 36415 COLL VENOUS BLD VENIPUNCTURE: CPT | Performed by: EMERGENCY MEDICINE

## 2018-10-13 PROCEDURE — 73130 X-RAY EXAM OF HAND: CPT

## 2018-10-13 RX ORDER — OXYCODONE HYDROCHLORIDE AND ACETAMINOPHEN 5; 325 MG/1; MG/1
1 TABLET ORAL ONCE
Status: COMPLETED | OUTPATIENT
Start: 2018-10-13 | End: 2018-10-13

## 2018-10-13 RX ORDER — BACITRACIN, NEOMYCIN, POLYMYXIN B 400; 3.5; 5 [USP'U]/G; MG/G; [USP'U]/G
1 OINTMENT TOPICAL ONCE
Status: COMPLETED | OUTPATIENT
Start: 2018-10-13 | End: 2018-10-13

## 2018-10-13 RX ADMIN — OXYCODONE HYDROCHLORIDE AND ACETAMINOPHEN 1 TABLET: 5; 325 TABLET ORAL at 19:21

## 2018-10-13 RX ADMIN — BACITRACIN, NEOMYCIN, POLYMYXIN B 1 SMALL APPLICATION: 400; 3.5; 5 OINTMENT TOPICAL at 20:27

## 2018-10-13 NOTE — ED PROVIDER NOTES
History  Chief Complaint   Patient presents with    Hand Swelling     Pt c/o right hand swelling after doctor taking pins out of hand on Thursday in office  Pt was told if bleeding or anything comes through the bandage to come into ED  Pt scheduled for surgery on Wednesday to get last pin out that he couldn't get  R hand swelling and drainage from wound at base of R 1st metacarpal  No fever or chills  S/p recent pin removal, still with indwelling pin pending removal w hand surgery  Currently on keflex for infection prophylaxis  No significant pain in hand or wrist or forearm  No other concerns  Prior to Admission Medications   Prescriptions Last Dose Informant Patient Reported? Taking?    BYSTOLIC 20 MG tablet  Self Yes Yes   Si mg     QUEtiapine (SEROquel) 300 mg tablet  Self Yes Yes   Sig: quetiapine 300 mg tablet   amLODIPine (NORVASC) 10 mg tablet  Self Yes Yes   Sig: Take 10 mg by mouth daily   cephalexin (KEFLEX) 500 mg capsule   No Yes   Sig: Take 1 capsule (500 mg total) by mouth every 12 (twelve) hours for 10 days   clonazePAM (KlonoPIN) 1 mg tablet  Self Yes Yes   Si mg     ibuprofen (MOTRIN) 600 mg tablet  Self No Yes   Sig: take 1 tablet by mouth every 6 hours if needed for pain   lansoprazole (PREVACID) 30 mg capsule  Self Yes Yes   Sig: lansoprazole 30 mg capsule,delayed release   metoprolol succinate (TOPROL-XL) 100 mg 24 hr tablet  Self Yes Yes   Sig: metoprolol succinate  mg tablet,extended release 24 hr   omega-3-acid ethyl esters (LOVAZA) 1 g capsule  Self Yes Yes   Sig: omega-3 acid ethyl esters 1 gram capsule   oxyCODONE-acetaminophen (PERCOCET)  mg per tablet  Self Yes Yes   Sig: oxycodone-acetaminophen 10 mg-325 mg tablet   simvastatin (ZOCOR) 40 mg tablet  Self Yes Yes   Sig: simvastatin 40 mg tablet   zolpidem (AMBIEN CR) 12 5 MG CR tablet  Self Yes Yes   Sig: zolpidem ER 12 5 mg tablet,extended release,multiphase      Facility-Administered Medications: None       Past Medical History:   Diagnosis Date    Anxiety     Chronic pain disorder     Depression     Fractures     Head injury     Hypertension        Past Surgical History:   Procedure Laterality Date    APPENDECTOMY      HAND SURGERY      KNEE SURGERY      [place] REPAIR INTERCARP/CARP-METACARP JT Right 8/28/2018    Procedure: THUMB [facility] SUSPENSIONPLASTY;  Surgeon: Christina Mendoza MD;  Location: [facility];  Service: Orthopedics    [place] WRIST Trice Flair LIG Right 8/28/2018    Procedure: ENDOSCOPIC CARPAL TUNNEL RELEASE;  Surgeon: Christina Mendoza MD;  Location: [facility];  Service: Orthopedics    WRIST SURGERY         Family History   Problem Relation Age of Onset    Stroke Brother     Heart attack Brother     Colon cancer Mother     Heart disease Father     Lung cancer Father      I have reviewed and agree with the history as documented  Social History   Substance Use Topics    Smoking status: Current Every Day Smoker     Types: Cigarettes    Smokeless tobacco: Never Used    Alcohol use No      Comment: socially        Review of Systems   Constitutional: Negative  Respiratory: Negative  Cardiovascular: Negative  Gastrointestinal: Negative  Genitourinary: Negative  Musculoskeletal: Positive for joint swelling  Negative for myalgias  Skin: Positive for wound  Negative for color change, pallor and rash  Neurological: Negative  Hematological: Negative for adenopathy  Does not bruise/bleed easily  Physical Exam  Physical Exam   Constitutional: He is oriented to person, place, and time  He appears well-developed and well-nourished  No distress  HENT:   Head: Normocephalic and atraumatic  Eyes: Pupils are equal, round, and reactive to light  EOM are normal    Neck: Normal range of motion  Neck supple  No JVD present  Pulmonary/Chest: No stridor  Musculoskeletal: Normal range of motion  He exhibits edema   He exhibits no tenderness or deformity  Diffuse edema R hand  No tenderness fluctuance or crepitus  Normal cap refill and perfusion and sensation  Trace amount of drainage expressed w palpation from wound at base of 1st MCP joint  Neurological: He is alert and oriented to person, place, and time  Skin: Skin is warm and dry  Capillary refill takes less than 2 seconds  No rash noted  He is not diaphoretic  No erythema  No pallor  Nursing note and vitals reviewed        Vital Signs  ED Triage Vitals [10/13/18 1908]   Temperature Pulse Respirations Blood Pressure SpO2   98 7 °F (37 1 °C) 85 19 133/75 99 %      Temp Source Heart Rate Source Patient Position - Orthostatic VS BP Location FiO2 (%)   Oral Monitor Lying Left arm --      Pain Score       8           Vitals:    10/13/18 1908 10/13/18 1930 10/13/18 1944 10/13/18 2000   BP: 133/75 127/76 114/70 101/59   Pulse: 85 81 75 73   Patient Position - Orthostatic VS: Lying Lying Lying Lying       Visual Acuity      ED Medications  Medications   oxyCODONE-acetaminophen (PERCOCET) 5-325 mg per tablet 1 tablet (1 tablet Oral Given 10/13/18 1921)   neomycin-bacitracin-polymyxin b (NEOSPORIN) ointment 1 small application (1 small application Topical Given 10/13/18 2027)       Diagnostic Studies  Results Reviewed     Procedure Component Value Units Date/Time    Basic metabolic panel [[id number]]  (Abnormal) Collected:  10/13/18 1926    Lab Status:  Final result Specimen:  Blood from Arm, Right Updated:  10/13/18 1957     Sodium 129 (L) mmol/L      Potassium 4 5 mmol/L      Chloride 94 (L) mmol/L      CO2 26 mmol/L      ANION GAP 9 mmol/L      BUN 20 mg/dL      Creatinine 0 92 mg/dL      Glucose 117 mg/dL      Calcium 8 9 mg/dL      eGFR 93 ml/min/1 73sq m     Narrative:         National Kidney Disease Education Program recommendations are as follows:  GFR calculation is accurate only with a steady state creatinine  Chronic Kidney disease less than 60 ml/min/1 73 sq  meters  Kidney failure less than 15 ml/min/1 73 sq  meters  CBC and differential [[id number]]  (Abnormal) Collected:  10/13/18 1926    Lab Status:  Final result Specimen:  Blood from Arm, Right Updated:  10/13/18 1944     WBC 8 27 Thousand/uL      RBC 3 60 (L) Million/uL      Hemoglobin 10 8 (L) g/dL      Hematocrit 31 6 (L) %      MCV 88 fL      MCH 30 0 pg      MCHC 34 2 g/dL      RDW 12 3 %      MPV 9 0 fL      Platelets 729 Thousands/uL      nRBC 0 /100 WBCs      Neutrophils Relative 62 %      Immat GRANS % 1 %      Lymphocytes Relative 25 %      Monocytes Relative 9 %      Eosinophils Relative 2 %      Basophils Relative 1 %      Neutrophils Absolute 5 20 Thousands/µL      Immature Grans Absolute 0 04 Thousand/uL      Lymphocytes Absolute 2 08 Thousands/µL      Monocytes Absolute 0 70 Thousand/µL      Eosinophils Absolute 0 19 Thousand/µL      Basophils Absolute 0 06 Thousands/µL                  XR hand 3+ views RIGHT   ED Interpretation by Johnathan Ervin MD (10/13 2009)   Surgical hardware  No fx or osteomyelitis  Procedures  Procedures       Phone Contacts  ED Phone Contact    ED Course                               MDM  Number of Diagnoses or Management Options  Hand swelling:   Diagnosis management comments: R hand swelling and small amount of drainage from surgical incision site  The drainage likely is fibrinous exudate, a small amount of purulence is also possible but there is no apparent collection and the pt is already on abx and has scheduled outpt f/u  At this point I don't feel that he requires emergent surgical evaluation and think d/c home and f/u next week as scheduled is reasonable  Pt advised to contact his surgeon Monday morning and advise that he was in the ED tonight w hand swelling          Amount and/or Complexity of Data Reviewed  Clinical lab tests: ordered and reviewed  Tests in the radiology section of CPT®: reviewed and ordered      CritCare Time    Disposition  Final diagnoses:   Hand swelling Time reflects when diagnosis was documented in both MDM as applicable and the Disposition within this note     Time User Action Codes Description Comment    10/13/2018  8:12 PM Subha Swann [M79 89] Hand swelling       ED Disposition     ED Disposition Condition Comment    Discharge  [address] discharge to home/self care  Condition at discharge: Stable        Follow-up Information    None         Discharge Medication List as of 10/13/2018  8:13 PM      CONTINUE these medications which have NOT CHANGED    Details   amLODIPine (NORVASC) 10 mg tablet Take 10 mg by mouth daily, Historical Med      BYSTOLIC 20 MG tablet 20 mg  , Starting Mon 7/23/2018, Historical Med      cephalexin (KEFLEX) 500 mg capsule Take 1 capsule (500 mg total) by mouth every 12 (twelve) hours for 10 days, Starting Thu 10/11/2018, Until Sun 10/21/2018, Print      clonazePAM (KlonoPIN) 1 mg tablet 1 mg  , Starting Mon 7/23/2018, Historical Med      ibuprofen (MOTRIN) 600 mg tablet take 1 tablet by mouth every 6 hours if needed for pain, Normal      lansoprazole (PREVACID) 30 mg capsule lansoprazole 30 mg capsule,delayed release, Historical Med      metoprolol succinate (TOPROL-XL) 100 mg 24 hr tablet metoprolol succinate  mg tablet,extended release 24 hr, Historical Med      omega-3-acid ethyl esters (LOVAZA) 1 g capsule omega-3 acid ethyl esters 1 gram capsule, Historical Med      oxyCODONE-acetaminophen (PERCOCET)  mg per tablet oxycodone-acetaminophen 10 mg-325 mg tablet, Historical Med      QUEtiapine (SEROquel) 300 mg tablet quetiapine 300 mg tablet, Historical Med      simvastatin (ZOCOR) 40 mg tablet simvastatin 40 mg tablet, Historical Med      zolpidem (AMBIEN CR) 12 5 MG CR tablet zolpidem ER 12 5 mg tablet,extended release,multiphase, Historical Med           No discharge procedures on file      ED Provider  Electronically Signed by           Darin Phipps MD  10/13/18 3108

## 2018-10-14 NOTE — DISCHARGE INSTRUCTIONS
You were seen in the ER for swelling in your hand after a recent surgery  Call your surgeon Monday and advise him that you have swelling and some drainage and that you were in the ER on Saturday, he will be able to see the results of the xray and blood work that you had done tonight  If you have worsening symptoms or any other concerns you can come back to the ER at any time to be reevaluated

## 2018-10-14 NOTE — ED NOTES
Discharge instructions reviewed with patient  Pt verbalized understanding and denied any questions at this time       Chet Agosto RN  10/13/18 3416

## 2018-10-15 NOTE — PRE-PROCEDURE INSTRUCTIONS
No outpatient prescriptions have been marked as taking for the 10/17/18 encounter [facility] Encounter)

## 2018-10-15 NOTE — PRE-PROCEDURE INSTRUCTIONS
Pre-Surgery Instructions:   Medication Instructions    amLODIPine (NORVASC) 10 mg tablet Patient was instructed to contact Physician for medication instruction   BYSTOLIC 20 MG tablet Patient was instructed by Physician and understands   cephalexin (KEFLEX) 500 mg capsule Patient was instructed by Physician and understands   clonazePAM (KlonoPIN) 1 mg tablet Patient was instructed by Physician and understands   metoprolol succinate (TOPROL-XL) 100 mg 24 hr tablet Patient was instructed by Physician and understands   omega-3-acid ethyl esters (LOVAZA) 1 g capsule Patient was instructed by Physician and understands   QUEtiapine (SEROquel) 300 mg tablet Patient was instructed by Physician and understands   simvastatin (ZOCOR) 40 mg tablet Patient was instructed by Physician and understands   zolpidem (AMBIEN CR) 12 5 MG CR tablet Patient was instructed by Physician and understands

## 2018-10-16 ENCOUNTER — ANESTHESIA EVENT (OUTPATIENT)
Dept: PERIOP | Facility: HOSPITAL | Age: 58
End: 2018-10-16
Payer: MEDICARE

## 2018-10-17 ENCOUNTER — HOSPITAL ENCOUNTER (OUTPATIENT)
Facility: HOSPITAL | Age: 58
Setting detail: OUTPATIENT SURGERY
Discharge: HOME | End: 2018-10-17
Attending: ORTHOPAEDIC SURGERY | Admitting: ORTHOPAEDIC SURGERY
Payer: MEDICARE

## 2018-10-17 ENCOUNTER — APPOINTMENT (OUTPATIENT)
Dept: RADIOLOGY | Facility: HOSPITAL | Age: 58
End: 2018-10-17
Payer: MEDICARE

## 2018-10-17 ENCOUNTER — ANESTHESIA (OUTPATIENT)
Dept: PERIOP | Facility: HOSPITAL | Age: 58
End: 2018-10-17
Payer: MEDICARE

## 2018-10-17 VITALS
DIASTOLIC BLOOD PRESSURE: 78 MMHG | HEIGHT: 69 IN | TEMPERATURE: 97.4 F | HEART RATE: 61 BPM | WEIGHT: 204.15 LBS | RESPIRATION RATE: 16 BRPM | SYSTOLIC BLOOD PRESSURE: 163 MMHG | OXYGEN SATURATION: 96 % | BODY MASS INDEX: 30.24 KG/M2

## 2018-10-17 PROCEDURE — 20680 REMOVAL OF IMPLANT DEEP: CPT | Performed by: ORTHOPAEDIC SURGERY

## 2018-10-17 PROCEDURE — 73100 X-RAY EXAM OF WRIST: CPT

## 2018-10-17 RX ORDER — SODIUM CHLORIDE, SODIUM LACTATE, POTASSIUM CHLORIDE, CALCIUM CHLORIDE 600; 310; 30; 20 MG/100ML; MG/100ML; MG/100ML; MG/100ML
125 INJECTION, SOLUTION INTRAVENOUS CONTINUOUS
Status: DISCONTINUED | OUTPATIENT
Start: 2018-10-17 | End: 2018-10-17 | Stop reason: HOSPADM

## 2018-10-17 RX ORDER — FENTANYL CITRATE 50 UG/ML
INJECTION, SOLUTION INTRAMUSCULAR; INTRAVENOUS AS NEEDED
Status: DISCONTINUED | OUTPATIENT
Start: 2018-10-17 | End: 2018-10-17 | Stop reason: SURG

## 2018-10-17 RX ORDER — PROPOFOL 10 MG/ML
INJECTION, EMULSION INTRAVENOUS AS NEEDED
Status: DISCONTINUED | OUTPATIENT
Start: 2018-10-17 | End: 2018-10-17 | Stop reason: SURG

## 2018-10-17 RX ORDER — ONDANSETRON 2 MG/ML
4 INJECTION INTRAMUSCULAR; INTRAVENOUS ONCE AS NEEDED
Status: DISCONTINUED | OUTPATIENT
Start: 2018-10-17 | End: 2018-10-17 | Stop reason: HOSPADM

## 2018-10-17 RX ORDER — MAGNESIUM HYDROXIDE 1200 MG/15ML
LIQUID ORAL AS NEEDED
Status: DISCONTINUED | OUTPATIENT
Start: 2018-10-17 | End: 2018-10-17 | Stop reason: HOSPADM

## 2018-10-17 RX ORDER — PROPOFOL 10 MG/ML
INJECTION, EMULSION INTRAVENOUS CONTINUOUS PRN
Status: DISCONTINUED | OUTPATIENT
Start: 2018-10-17 | End: 2018-10-17 | Stop reason: SURG

## 2018-10-17 RX ORDER — BUPIVACAINE HYDROCHLORIDE 5 MG/ML
INJECTION, SOLUTION PERINEURAL AS NEEDED
Status: DISCONTINUED | OUTPATIENT
Start: 2018-10-17 | End: 2018-10-17 | Stop reason: HOSPADM

## 2018-10-17 RX ORDER — FENTANYL CITRATE/PF 50 MCG/ML
25 SYRINGE (ML) INJECTION
Status: DISCONTINUED | OUTPATIENT
Start: 2018-10-17 | End: 2018-10-17 | Stop reason: HOSPADM

## 2018-10-17 RX ORDER — METOCLOPRAMIDE HYDROCHLORIDE 5 MG/ML
10 INJECTION INTRAMUSCULAR; INTRAVENOUS ONCE
Status: COMPLETED | OUTPATIENT
Start: 2018-10-17 | End: 2018-10-17

## 2018-10-17 RX ORDER — LIDOCAINE HYDROCHLORIDE AND EPINEPHRINE 10; 10 MG/ML; UG/ML
INJECTION, SOLUTION INFILTRATION; PERINEURAL AS NEEDED
Status: DISCONTINUED | OUTPATIENT
Start: 2018-10-17 | End: 2018-10-17 | Stop reason: HOSPADM

## 2018-10-17 RX ORDER — ONDANSETRON 2 MG/ML
4 INJECTION INTRAMUSCULAR; INTRAVENOUS EVERY 6 HOURS PRN
Status: DISCONTINUED | OUTPATIENT
Start: 2018-10-17 | End: 2018-10-17 | Stop reason: HOSPADM

## 2018-10-17 RX ORDER — MIDAZOLAM HYDROCHLORIDE 1 MG/ML
INJECTION INTRAMUSCULAR; INTRAVENOUS AS NEEDED
Status: DISCONTINUED | OUTPATIENT
Start: 2018-10-17 | End: 2018-10-17 | Stop reason: SURG

## 2018-10-17 RX ADMIN — METOCLOPRAMIDE 10 MG: 5 INJECTION, SOLUTION INTRAMUSCULAR; INTRAVENOUS at 12:48

## 2018-10-17 RX ADMIN — FENTANYL CITRATE 50 MCG: 50 INJECTION, SOLUTION INTRAMUSCULAR; INTRAVENOUS at 13:13

## 2018-10-17 RX ADMIN — FENTANYL CITRATE 25 MCG: 50 INJECTION, SOLUTION INTRAMUSCULAR; INTRAVENOUS at 13:51

## 2018-10-17 RX ADMIN — FENTANYL CITRATE 25 MCG: 50 INJECTION, SOLUTION INTRAMUSCULAR; INTRAVENOUS at 13:44

## 2018-10-17 RX ADMIN — FENTANYL CITRATE 25 MCG: 50 INJECTION, SOLUTION INTRAMUSCULAR; INTRAVENOUS at 14:05

## 2018-10-17 RX ADMIN — MIDAZOLAM HYDROCHLORIDE 2 MG: 1 INJECTION, SOLUTION INTRAMUSCULAR; INTRAVENOUS at 13:11

## 2018-10-17 RX ADMIN — CEFAZOLIN SODIUM 2000 MG: 2 SOLUTION INTRAVENOUS at 13:14

## 2018-10-17 RX ADMIN — PROPOFOL 50 MG: 10 INJECTION, EMULSION INTRAVENOUS at 13:13

## 2018-10-17 RX ADMIN — PROPOFOL 100 MCG/KG/MIN: 10 INJECTION, EMULSION INTRAVENOUS at 13:13

## 2018-10-17 RX ADMIN — SODIUM CHLORIDE, SODIUM LACTATE, POTASSIUM CHLORIDE, AND CALCIUM CHLORIDE 125 ML/HR: .6; .31; .03; .02 INJECTION, SOLUTION INTRAVENOUS at 12:52

## 2018-10-17 NOTE — OP NOTE
OPERATIVE REPORT  PATIENT NAME: Marvene Litten    :  1960  MRN: [id number]  Pt Location: [facility] OR ROOM 03    SURGERY DATE: 10/17/2018    Surgeon(s) and Role:     * Caio Lee MD - Primary    Preop Diagnosis:  Retained hardware right wrist, deep    Post-Op Diagnosis Codes:  Retained hardware right wrist, deep    Procedure(s) (LRB):  WRIST REMOVAL OF HARDWARE (Right)    Specimen(s):  * No specimens in log *    Estimated Blood Loss:   Minimal    Drains:  none    Anesthesia Type:   IV Sedation with Anesthesia    Operative Indications:  Retained hardware right wrist    Operative Findings:  Retained hardware right wrist with loosening    Complications:   None    Procedure and Technique:  Patient was identified in the preop screening area  Consent was signed and verified after identifying the correct operative site  Patient was taken back to the operating room where MAC sedation was performed along with local digital block  Patient's right upper extremity was then prepped and draped normal fashion chlorhexidine solution  The ulcerative lesion around the base of the thumb in the midportion of the well-healed scar was ellipsed out with a sharp 15 blade knife creating fresh skin edges around the ulcerative site  The ellipsed portion of skin and ulcerative site were removed and passed off the back table  The area was noted to have fibrous debris which was loose and was debrided using a rongeur  Once removed the pin site was readily identified  Pin was noted to be loose and was easily removed  Wound was then irrigated with copious amounts of saline solution  Under fluoroscopy the right thumb was placed under axial load and verified that proximal migration of the thumb metacarpal was approximately 1-1/2 cm from the distal end of the scaphoid  There was clearly no contact between the base of the metacarpal and the distal end of the scaphoid    The wound was then closed using 4 0 nylon suture opted horizontal mattress fashion  The wound was then dressed with a sterile dressing  Patient tolerated the procedure well there is no complications during the case patient was awake in the operating room and sent to the PACU in stable condition       I was present for the entire procedure    Patient Disposition:  PACU     SIGNATURE: Shea Davis MD  DATE: October 17, 2018  TIME: 1:35 PM

## 2018-10-17 NOTE — DISCHARGE INSTRUCTIONS
Post Operative Instructions    You have had surgery on your arm today, please read and follow the information below:  · Elevate your hand above your elbow during the next 24-48 hours to help with swelling  · Place your hand and arm over your head with motion at your shoulder three times a day  · Do not apply any cream/ointment/oil to your incisions including antibiotics  · Do not soak your hands in standing water (dishwater, tubs, Jacuzzi's, pools, etc ) until given permission (typically 2-3 weeks after injury)    Call the office if you notice any:  · Increased numbness or tingling of your hand or fingers that is not relieved with elevation  · Increasing pain that is not controlled with medication  · Difficulty chewing, breathing, swallowing  · Chest pains or shortness of breath  · Fever over 101 4 degrees  Bandage: Do NOT remove bandage until follow-up appointment  Motion: Move fingers into a fist 5 times a day, DO NOT move any splinted fingers  Weight bearing status: Avoid heavy lifting (>5 pounds) with the extremity that was operated on until follow up appointment  Normal activities of daily living are OK  Ice: Ice for 10 minutes every hour as needed for swelling x 24 hours  Sling: No sling necessary  Pain medication: Patient is on Percocet pain medication at home  He will not need any additional pain medications  If he takes Percocet, he should NOT take Tylenol at the same time since Percocet also contains Tylenol  After surgery, we would like you to take Ibuprofen 600mg one tablet by mouth every 6 hours with food (at breakfast, lunch and dinner)  AND Tylenol 500 mg one tablet by mouth every 6 hours  (at breakfast, lunch and dinner) for 5-7 days after your surgery  Please take these medication EVERYDAY after surgery for 5-7 days, and not just as needed  You can take these medications at the same time    Taking these medications after surgery will limit your need for prescription pain medication  Follow-up Appointment: 7-10 days with Dr Jade Broussard  Occupational Therapy: No therapy needed at this time  Please call the office if you have any questions or concerns regarding your post-operative care

## 2018-10-17 NOTE — H&P (VIEW-ONLY)
SUBJECTIVE:  Claudia Gunderson is a 66y o  year old male who presents for follow up after surgery for right endoscopic carpal tunnel and right revision of CMC suspension plasty done on  8/28/2018  Today patient has no complaints of pain  Pt states he is doing well  VITALS:  Vitals:    09/18/18 1109   BP: 148/91   Pulse: 87   Resp: 18       PHYSICAL EXAMINATION:  General: well developed and well nourished, alert, oriented times 3 and appears comfortable  Psychiatric: Normal    MUSCULOSKELETAL EXAMINATION:  Right hand  Pt is in thumb spica cast  Range of Motion: not able to be tested   Neurovascular status: Neuro intact, good cap refill      STUDIES REVIEWED:  No studies reviewed  PROCEDURES PERFORMED:  Procedures  No Procedures performed today      ASSESSMENT/PLAN:    S/P right endoscopic carpal tunnel release with resolutions   *Pt will call the office if he has any questions or concerns    S/P revision of right thumb CMC suspension plasty  * Pt will retmain in thumb spica cast  * Pt will follow up in 3 weeks for cast removal xrays and pulling of pin      FOLLOW UP:  Return in about 3 weeks (around 10/9/2018)        TO DO AT NEXT VISIT:  Re-evaluation of current issue, X-rays of the  right  wrist, Cast off and Pins out      Scribe Attestation    I,:   Jessy Singer am acting as a scribe while in the presence of the attending physician :        I,:   Josh Hart MD personally performed the services described in this documentation    as scribed in my presence :

## 2018-10-17 NOTE — ANESTHESIA POSTPROCEDURE EVALUATION
Post-Op Assessment Note      CV Status:  Stable    Mental Status:  Alert and awake    Hydration Status:  Stable    PONV Controlled:  None    Airway Patency:  Patent and adequate    Post Op Vitals Reviewed: Yes          Staff: Anesthesiologist, CRNA           BP   135/62   Temp   97 8   Pulse  64   Resp   18   SpO2   98%

## 2018-10-25 ENCOUNTER — OFFICE VISIT (OUTPATIENT)
Dept: OBGYN CLINIC | Facility: CLINIC | Age: 58
End: 2018-10-25

## 2018-10-25 VITALS
DIASTOLIC BLOOD PRESSURE: 101 MMHG | WEIGHT: 204 LBS | HEIGHT: 69 IN | BODY MASS INDEX: 30.21 KG/M2 | HEART RATE: 80 BPM | SYSTOLIC BLOOD PRESSURE: 154 MMHG

## 2018-10-25 DIAGNOSIS — Z98.890 STATUS POST SURGERY: Primary | ICD-10-CM

## 2018-10-25 PROCEDURE — 99024 POSTOP FOLLOW-UP VISIT: CPT | Performed by: ORTHOPAEDIC SURGERY

## 2018-10-25 RX ORDER — VALSARTAN AND HYDROCHLOROTHIAZIDE 160; 12.5 MG/1; MG/1
1 TABLET, FILM COATED ORAL
COMMUNITY
Start: 2018-10-13

## 2018-10-25 RX ORDER — TELMISARTAN 80 MG/1
TABLET ORAL
COMMUNITY
End: 2019-01-15

## 2018-10-25 RX ORDER — CEPHALEXIN 500 MG/1
CAPSULE ORAL
COMMUNITY
End: 2018-11-27

## 2018-10-25 NOTE — PROGRESS NOTES
SUBJECTIVE:  Sunny De La Torre is a 66y o  year old male who presents for follow up after surgery for removal of painful hardware right wrist  done on  10/17/2018  Patient had a right CMC suspension plasty and right a CT are performed 08/20/2018  Today patient has no pain but significant swelling in his right hand  VITALS:  Vitals:    10/25/18 1505   BP: (!) 154/101   Pulse: 80       PHYSICAL EXAMINATION:  General: well developed and well nourished, alert, oriented times 3 and appears comfortable  Psychiatric: Normal    MUSCULOSKELETAL EXAMINATION:  Right hand   Incision: clean and dry sutures intact  Range of Motion:  Normal range of motion  Neurovascular status: Neuro intact, good cap refill    STUDIES REVIEWED:  No studies reviewed  PROCEDURES PERFORMED:  Procedures  No Procedures performed today    ASSESSMENT/PLAN:  S/P removal of Painful hardware 10/17/2018  S/P right CMC suspension plasty  endoscopic right carpal tunnel repair Release done 08/20/2018  * patient will continue to work on range of motion strengthening in therapy, new OT order was placed today  * Patient will follow up in the office in 3 months          FOLLOW UP:  Return in about 3 months (around 1/25/2019)        TO DO AT NEXT VISIT:  Re-evaluation of current issue      Scribe Attestation    I,:   Carlton Schwartz am acting as a scribe while in the presence of the attending physician :        I,:   Marissa Crooks MD personally performed the services described in this documentation    as scribed in my presence :

## 2018-10-30 ENCOUNTER — EVALUATION (OUTPATIENT)
Dept: OCCUPATIONAL THERAPY | Facility: CLINIC | Age: 58
End: 2018-10-30
Payer: MEDICARE

## 2018-10-30 DIAGNOSIS — M19.049 CMC ARTHRITIS: Primary | ICD-10-CM

## 2018-10-30 DIAGNOSIS — G56.01 CARPAL TUNNEL SYNDROME OF RIGHT WRIST: ICD-10-CM

## 2018-10-30 PROCEDURE — 97035 APP MDLTY 1+ULTRASOUND EA 15: CPT

## 2018-10-30 PROCEDURE — 97110 THERAPEUTIC EXERCISES: CPT

## 2018-10-30 PROCEDURE — G8985 CARRY GOAL STATUS: HCPCS

## 2018-10-30 PROCEDURE — G8984 CARRY CURRENT STATUS: HCPCS
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
## 2018-11-02 ENCOUNTER — OFFICE VISIT (OUTPATIENT)
Dept: OCCUPATIONAL THERAPY | Facility: CLINIC | Age: 58
End: 2018-11-02
Payer: MEDICARE

## 2018-11-02 DIAGNOSIS — G56.01 CARPAL TUNNEL SYNDROME OF RIGHT WRIST: ICD-10-CM

## 2018-11-02 DIAGNOSIS — M19.049 CMC ARTHRITIS: Primary | ICD-10-CM

## 2018-11-02 PROCEDURE — 97110 THERAPEUTIC EXERCISES: CPT

## 2018-11-02 PROCEDURE — 97140 MANUAL THERAPY 1/> REGIONS: CPT

## 2018-11-02 NOTE — PROGRESS NOTES
Daily Note     Today's date: 2018  Patient name: Rigo Bardales  : 1960  MRN: [id number]  Referring provider: Adán Santizo MD  Dx:   Encounter Diagnosis     ICD-10-CM    1  CMC arthritis M19 049    2  Carpal tunnel syndrome of right wrist G56 01                   Subjective:  My hand has been swollen and sore since the last time I was here  Objective: See treatment diary below    Precautions LRTI protocol  Sx 18     Specialty Daily Treatment Diary      Manual  18         Dressing change 2x2, cesar  Mollelast and comprilan bandage for edema         Ortho fit/train Forearm based thumb spica, IP free            Edema massage   20'                                           Exercise Diary  9/4 10/30  11/2       AROM digits 2-5 HEP HEP  x5       AROM thumb IP joint HEP HEP- MP/IP/CMC joint         Dr Mikey Hardy stretch             [facility] :             AROM thumb all planes   HEP   Opposition to D2-3 only x 10       AROM wrist all planes      x5 all planes                                                                                                                                                                                                                 Modalities 10/30 11/2         Ultrasound 3 3 Mhz, 0 8 w/cm2, 50% pulsed 8' on dorsum of R thumb 4' cmc, 4' CT                                             Assessment: Tolerated treatment fair  Patient would benefit from continued OT  Edema noted in the right hand and wrist, but not in the digits  Patient had been wearing a prefab thumb/short wrist brace issued at another facility that straps at the wrist   Patient instructed to stop wearing the splint as it may be increasing edema in the hand  Some decrease in edema after treatment and patient stated the pain was minimally decreased  Patient 9/10 this date  Patient instructed to continue with AROM and bandaging and icing for HEP  Plan: Continue per plan of care  Progress treament per protocol

## 2018-11-06 ENCOUNTER — OFFICE VISIT (OUTPATIENT)
Dept: OCCUPATIONAL THERAPY | Facility: CLINIC | Age: 58
End: 2018-11-06
Payer: MEDICARE

## 2018-11-06 DIAGNOSIS — M19.049 CMC ARTHRITIS: Primary | ICD-10-CM

## 2018-11-06 DIAGNOSIS — G56.01 CARPAL TUNNEL SYNDROME OF RIGHT WRIST: ICD-10-CM

## 2018-11-06 PROCEDURE — 97035 APP MDLTY 1+ULTRASOUND EA 15: CPT

## 2018-11-06 PROCEDURE — 97110 THERAPEUTIC EXERCISES: CPT

## 2018-11-06 PROCEDURE — 97140 MANUAL THERAPY 1/> REGIONS: CPT

## 2018-11-06 NOTE — PROGRESS NOTES
Daily Note     Today's date: 2018  Patient name: Izzy Parkinson  : 1960  MRN: [id number]  Referring provider: Dilia Jack MD  Dx:   Encounter Diagnosis     ICD-10-CM    1  CMC arthritis M19 049    2  Carpal tunnel syndrome of right wrist G56 01                   Subjective: I kept the bandages on my hand since I came to therapy last time  I want the hot pack US and cold pack like I had at my other therapy  Objective: See treatment diary below    Precautions LRTI protocol   Sx 18     Specialty Daily Treatment Diary      Manual  18       Dressing change 2x2, cesar  Mollelast and comprilan bandage for edema  Moellelast and comprilan bandage for edema       Ortho fit/train Forearm based thumb spica, IP free            Edema massage   20'  20'                                         Exercise Diary  9/4 10/30  11/2  11/6/18     AROM digits 2-5 HEP HEP  x5  x10     AROM thumb IP joint HEP HEP- MP/IP/CMC joint    x10     Dr Chi Montgomery stretch        x5     Beginnng :             AROM thumb all planes   HEP   Opposition to D2-3 only x 10  x10     AROM wrist all planes      x5 all planes  x10      AAROM digits and thumb        x20     AAROM wrist        x20                                                                                                                                                                                   Modalities 10/30 11/2  11/6       Ultrasound 3 3 Mhz, 0 8 w/cm2, 50% pulsed 8' on dorsum of R thumb 4' cmc, 4' CT  4' [facility], 4' CT, 2' ulnar side of wrist        MHP before tx      10'        CP after tx      10'               Assessment: Tolerated treatment fair  Patient would benefit from continued OT  Decreased edema in the hand and wrist this date  Patient has been compliant with compression bandaging  Pain level before tx 810  Pain level after tx 5/10  Patient took a pain pill at the beginning of his session      Plan: Continue per plan of care  Progress treatment as tolerated

## 2018-11-08 ENCOUNTER — TELEPHONE (OUTPATIENT)
Dept: OBGYN CLINIC | Facility: CLINIC | Age: 58
End: 2018-11-08

## 2018-11-08 ENCOUNTER — OFFICE VISIT (OUTPATIENT)
Dept: OCCUPATIONAL THERAPY | Facility: CLINIC | Age: 58
End: 2018-11-08
Payer: MEDICARE

## 2018-11-08 DIAGNOSIS — T14.8XXA WOUND OF SKIN: Primary | ICD-10-CM

## 2018-11-08 DIAGNOSIS — M19.049 CMC ARTHRITIS: Primary | ICD-10-CM

## 2018-11-08 DIAGNOSIS — G56.01 CARPAL TUNNEL SYNDROME OF RIGHT WRIST: ICD-10-CM

## 2018-11-08 PROCEDURE — 97110 THERAPEUTIC EXERCISES: CPT

## 2018-11-08 PROCEDURE — G8985 CARRY GOAL STATUS: HCPCS

## 2018-11-08 PROCEDURE — 97140 MANUAL THERAPY 1/> REGIONS: CPT

## 2018-11-08 PROCEDURE — G8984 CARRY CURRENT STATUS: HCPCS

## 2018-11-08 RX ORDER — CEPHALEXIN 500 MG/1
500 CAPSULE ORAL EVERY 6 HOURS SCHEDULED
Qty: 28 CAPSULE | Refills: 0 | Status: SHIPPED | OUTPATIENT
Start: 2018-11-08 | End: 2018-11-15

## 2018-11-08 NOTE — TELEPHONE ENCOUNTER
----- Message from Kristine Rivera PA-C sent at 11/8/2018  3:47 PM EST -----  Please call this patient today and let him know a prescription for keflex was sent to the pharmacy on file for him to  today and start  His occupational therapist called me today and told me about the wound  I discussed this with Dr Monica Aviles and he wants Yovanny Mills to take the antibiotic  Please ask him to call us if he has any purulent drainage, redness or fever or chills  Thank you

## 2018-11-08 NOTE — PROGRESS NOTES
Daily Note     Today's date: 2018  Patient name: Joel Cast  : 1960  MRN: [id number]  Referring provider: Cesar Estrada MD  Dx:   Encounter Diagnosis     ICD-10-CM    1  CMC arthritis M19 049    2  Carpal tunnel syndrome of right wrist G56 01                   Subjective:  My hand is better, but it's still sore  Objective: See treatment diary below  Precautions LRTI protocol   Sx 18     Specialty Daily Treatment Diary      Manual  18     Dressing change 2x2, cesar  Mollelast and comprilan bandage for edema  Moellelast and comprilan bandage for edema  Adaptic, 2x2, 2 Mollelast, comprilan     Ortho fit/train Forearm based thumb spica, IP free            Edema massage   20'  20'  15'                                       Exercise Diary  9/4 10/30  11/2  11/6/18  11/8   AROM digits 2-5 HEP HEP  x5  x10  x10   AROM thumb IP joint HEP HEP- MP/IP/CMC joint    x10  x10   Dr Aimee Lemons stretch        x5     Beginnng :             AROM thumb all planes   HEP   Opposition to D2-3 only x 10  x10  x10   AROM wrist all planes      x5 all planes  x10  x10    AAROM digits and thumb        x20  x20   AAROM wrist        x20  x20                                                                                                                                                                                 Modalities 10/30 11/2  11/6  11/8     Ultrasound 3 3 Mhz, 0 8 w/cm2, 50% pulsed 8' on dorsum of R thumb 4' cmc, 4' CT  4' [facility], 4' CT, 2' ulnar side of wrist  4' CT, 4' dorsum hand, but not [facility]      MHP before tx      10'  5'      CP after tx      10'              Assessment: Tolerated treatment fair  Patient would benefit from continued OT  Scab on [facility] joint incision came off with dressing despite using saline to wet the dressing  Small abscess with minimal serous fluid noted    Dressed wound with adaptic, 2x2, mollelast   Contacted Destin Jennings PA-C to report findings      Plan: Continue per plan of care

## 2018-11-09 ENCOUNTER — APPOINTMENT (OUTPATIENT)
Dept: OCCUPATIONAL THERAPY | Facility: CLINIC | Age: 58
End: 2018-11-09
Payer: MEDICARE

## 2018-11-09 ENCOUNTER — TELEPHONE (OUTPATIENT)
Dept: OBGYN CLINIC | Facility: CLINIC | Age: 58
End: 2018-11-09

## 2018-11-09 ENCOUNTER — TELEPHONE (OUTPATIENT)
Dept: OTHER | Facility: HOSPITAL | Age: 58
End: 2018-11-09

## 2018-11-09 NOTE — TELEPHONE ENCOUNTER
----- Message from Shelah Dance, PA-C sent at 11/8/2018  8:45 PM EST -----  Please try calling back on Friday morning  Thank you         ----- Message -----  From: Brittni Arango MA  Sent: 11/8/2018   4:11 PM  To: Shelah Dance, PA-C    I did call this patient noted in phone call had to leave him a message  ----- Message -----  From: Shelah Dance, PA-C  Sent: 11/8/2018   3:47 PM  To: Gaby Mistry    Please call this patient today and let him know a prescription for keflex was sent to the pharmacy on file for him to  today and start  His occupational therapist called me today and told me about the wound  I discussed this with Dr Brina Wesley and he wants Gladys West to take the antibiotic  Please ask him to call us if he has any purulent drainage, redness or fever or chills  Thank you

## 2018-11-09 NOTE — TELEPHONE ENCOUNTER
Noted  Please try calling back this afternoon one last time  Thank you         ----- Message from Roma Zamorano MA sent at 11/9/2018 11:06 AM EST -----  Called this patient again to review Juan J Jarodgabe  Message no answer, left second message as requested and again given # to call back if having symptoms advised by Diane Pool  ----- Message -----  From: Tita Snellen, PA-C  Sent: 11/8/2018   8:45 PM  To: Roma Zamorano MA    Please try calling back on Friday morning  Thank you         ----- Message -----  From: Roma Zamorano MA  Sent: 11/8/2018   4:11 PM  To: Tita Snellen, PA-C    I did call this patient noted in phone call had to leave him a message  ----- Message -----  From: Tita Snellen, PA-C  Sent: 11/8/2018   3:47 PM  To: Josh Mistry    Please call this patient today and let him know a prescription for keflex was sent to the pharmacy on file for him to  today and start  His occupational therapist called me today and told me about the wound  I discussed this with Dr Estela Arteaga and he wants [facility] to take the antibiotic  Please ask him to call us if he has any purulent drainage, redness or fever or chills  Thank you

## 2018-11-09 NOTE — TELEPHONE ENCOUNTER
patient called back he is having drainage & redness, scheduled and appointment for Monday in [facility] e-mail to Reymundo Cid to make her aware

## 2018-11-12 ENCOUNTER — OFFICE VISIT (OUTPATIENT)
Dept: OBGYN CLINIC | Facility: MEDICAL CENTER | Age: 58
End: 2018-11-12

## 2018-11-12 VITALS
HEART RATE: 74 BPM | SYSTOLIC BLOOD PRESSURE: 141 MMHG | DIASTOLIC BLOOD PRESSURE: 95 MMHG | WEIGHT: 204 LBS | HEIGHT: 69 IN | BODY MASS INDEX: 30.21 KG/M2

## 2018-11-12 DIAGNOSIS — Z48.89 AFTERCARE FOLLOWING SURGERY: Primary | ICD-10-CM

## 2018-11-12 PROCEDURE — 99024 POSTOP FOLLOW-UP VISIT: CPT | Performed by: ORTHOPAEDIC SURGERY

## 2018-11-12 NOTE — PROGRESS NOTES
SUBJECTIVE:  Sanjana Cortez is a 66y o  year old male who presents for follow up after surgery for removal of painful hardware of his RIGHT wrist performed on  10/17/2018  Patient also had a RIGHT CMC suspension plasty and a RIGHT CT Release performed on 8/20/2018  Patient notes that he began to notice very mild drainage from his incision about 4 days ago  He contacted the office and was placed on Keflex  He states that he has been compliant with this medication since it was prescribed  The wound has been decreasing in drainage since beginning the medication  Denies fever, chills      VITALS:  Vitals:    11/12/18 1346   BP: 141/95   Pulse: 74       PHYSICAL EXAMINATION:  General: well developed and well nourished, alert, oriented times 3 and appears comfortable  Psychiatric: Normal    MUSCULOSKELETAL EXAMINATION:  RIGHT WRIST/HAND:  Incision: clean and slight drainage noted from incision, minimal erythema  Range of Motion: Normal ROM of RIGHT wrist  Neurovascular status: Neuro intact, good cap refill      STUDIES REVIEWED:  No studies reviewed  PROCEDURES PERFORMED:  Procedures  No Procedures performed today      ASSESSMENT/PLAN:  1 month S/P removal of painful hardware of RIGHT wrist done on 10/17/2018  S/P RIGHT CMC Suspension plasty and Endoscopic RIGHT Carpal Tunnel Repair done on 8/20/2018:  * Done today: dressing changed and wound care  * Patient was instructed to change dressings daily  He was given the appropriate supplies to do so today in the office  * Continue OT to work on ROM and strengthening  * Patient was also instructed to continue the use of Keflex as prescribed    FOLLOW UP:  Return in about 1 week (around 11/19/2018) for Recheck of RIGHT wrist wound        TO DO AT NEXT VISIT:  Re-evaluation of current issue      Scribe Attestation    I,:   Danielle Duff am acting as a scribe while in the presence of the attending physician :        I,:   Sree Modi MD personally performed the services described in this documentation    as scribed in my presence :

## 2018-11-13 ENCOUNTER — OFFICE VISIT (OUTPATIENT)
Dept: OCCUPATIONAL THERAPY | Facility: CLINIC | Age: 58
End: 2018-11-13
Payer: MEDICARE

## 2018-11-13 DIAGNOSIS — M19.049 CMC ARTHRITIS: Primary | ICD-10-CM

## 2018-11-13 DIAGNOSIS — G56.01 CARPAL TUNNEL SYNDROME OF RIGHT WRIST: ICD-10-CM

## 2018-11-13 PROCEDURE — 97035 APP MDLTY 1+ULTRASOUND EA 15: CPT

## 2018-11-13 PROCEDURE — 97140 MANUAL THERAPY 1/> REGIONS: CPT

## 2018-11-13 PROCEDURE — 97110 THERAPEUTIC EXERCISES: CPT

## 2018-11-13 NOTE — PROGRESS NOTES
Daily Note     Today's date: 2018  Patient name: Kennedi Cruz  : 1960  MRN: [id number]  Referring provider: Rylee Plummer MD  Dx:   Encounter Diagnosis     ICD-10-CM    1  CMC arthritis M19 049    2  Carpal tunnel syndrome of right wrist G56 01                   Subjective: I saw the doctor and he said to keep the wound dry and change my dressing everyday  Objective: See treatment diary below  Precautions LRTI protocol   Sx 18     Specialty Daily Treatment Diary      Manual  18   Dressing change 2x2, cesar  Mollelast and comprilan bandage for edema  Moellelast and comprilan bandage for edema  Adaptic, 2x2, 2 Mollelast, comprilan  2x2, cesar, coban   Ortho fit/train Forearm based thumb spica, IP free            Edema massage   20'  20'  15'  15'                                     Exercise Diary  11/13 10/30  11/2  11/6/18  11/8   AROM digits 2-5 x10 HEP  x5  x10  x10   AROM thumb IP joint x10 HEP- MP/IP/CMC joint    x10  x10   Dr Hodges An stretch  x10      x5     Beginnng :             AROM thumb all planes  x10 HEP   Opposition to D2-3 only x 10  x10  x10   AROM wrist all planes  x10    x5 all planes  x10  x10    AAROM digits and thumb  x20      x20  x20   AAROM wrist  x20      x20  x20                                                                                                                                                                                 Modalities 10/30 11/2  11/6  11/8  11/13   Ultrasound 3 3 Mhz, 0 8 w/cm2, 50% pulsed 8' on dorsum of R thumb 4' cmc, 4' CT  4' [facility], 4' CT, 2' ulnar side of wrist  4' CT, 4' dorsum hand, but not [facility]   4' CT, 4' dorsum hand, but not [facility]    MHP before tx      10'  5'      CP after tx      10'    10'               Assessment: Tolerated treatment well  Patient would benefit from continued OT  Minimal blood drainage from wound  Wound is less deep than previous session  Improved tolerance of digit AROM  Patient able to oppose thumb to ring finger  He came into therapy wearing his prefab thumb spica splint, but stated he will not wear it any more  States he wore the splint for protection while doing laundry  Plan: Continue per plan of care  Progress treatment as tolerated

## 2018-11-15 ENCOUNTER — OFFICE VISIT (OUTPATIENT)
Dept: OCCUPATIONAL THERAPY | Facility: CLINIC | Age: 58
End: 2018-11-15
Payer: MEDICARE

## 2018-11-15 DIAGNOSIS — G56.01 CARPAL TUNNEL SYNDROME OF RIGHT WRIST: ICD-10-CM

## 2018-11-15 DIAGNOSIS — M19.049 CMC ARTHRITIS: Primary | ICD-10-CM

## 2018-11-15 PROCEDURE — 97035 APP MDLTY 1+ULTRASOUND EA 15: CPT

## 2018-11-15 PROCEDURE — 97140 MANUAL THERAPY 1/> REGIONS: CPT

## 2018-11-15 PROCEDURE — 97110 THERAPEUTIC EXERCISES: CPT

## 2018-11-15 NOTE — PROGRESS NOTES
Daily Note     Today's date: 11/15/2018  Patient name: Arely Crystal  : 1960  MRN: [id number]  Referring provider: Leonard Sparks MD  Dx:   Encounter Diagnosis     ICD-10-CM    1  CMC arthritis M19 049    2  Carpal tunnel syndrome of right wrist G56 01                   Subjective: My hand is swelling again  The bandage keeps slipping down my arm  Objective: See treatment diary below    Precautions LRTI protocol   Sx 18     Specialty Daily Treatment Diary      Manual  11/15  11/2/18  11/6/18  11/8/18  11/13   Dressing change 2x2, cesar, coban  Mollelast and comprilan bandage for edema  Moellelast and comprilan bandage for edema  Adaptic, 2x2, 2 Mollelast, comprilan  2x2, cesar, coban   Ortho fit/train             Edema massage 15' 20'  20'  15'  15'                                     Exercise Diary  11/13 11/15  11/2  11/6/18  11/8   AROM digits 2-5 x10 x10  x5  x10  x10   AROM thumb IP joint x10 x10    x10  x10   Dr Glenda Rockwell stretch  x10  x10    x5     Beginnng :             AROM thumb all planes  x10 x10  Opposition to D2-3 only x 10  x10  x10   AROM wrist all planes  x10  x10  x5 all planes  x10  x10    AAROM digits and thumb  x20  x20    x20  x20   AAROM wrist  x20  x20    x20  x20                                                                                                                                                                                 Modalities 11/15 11/2  11/6  11/8  11/13   Ultrasound 3 3 Mhz, 0 8 w/cm2, 20% pulsed 4' CT, 4' dorsum hand, but not CMC 4' cmc, 4' CT  4' [facility], 4' CT, 2' ulnar side of wrist  4' CT, 4' dorsum hand, but not [facility]   4' CT, 4' dorsum hand, but not [facility]    MHP before tx      10'  5'      CP after tx      10'    10'            Assessment: Tolerated treatment fair  Patient would benefit from continued OT  Increased edema in the right hand this date  Moderate yellow drainage from the wound  No bloody drainage this date    Patient came in with loose dressing of 2x2, cesar, coban over wound, but dressing was not compressive over the wound  Redressed wound and made it larger to cover the thumb and hand so that the dressing will not slip  Patient given more coban, 2x2, and cesar for daily home dressing changes and instructed in the importance of having enough compression from the bandages to prevent the bandage from sliding  Plan: Continue per plan of care  Progress treatment as tolerated

## 2018-11-19 ENCOUNTER — TELEPHONE (OUTPATIENT)
Dept: OBGYN CLINIC | Facility: MEDICAL CENTER | Age: 58
End: 2018-11-19

## 2018-11-19 ENCOUNTER — OFFICE VISIT (OUTPATIENT)
Dept: OBGYN CLINIC | Facility: MEDICAL CENTER | Age: 58
End: 2018-11-19

## 2018-11-19 VITALS
HEIGHT: 69 IN | WEIGHT: 204 LBS | DIASTOLIC BLOOD PRESSURE: 86 MMHG | BODY MASS INDEX: 30.21 KG/M2 | SYSTOLIC BLOOD PRESSURE: 126 MMHG | HEART RATE: 70 BPM

## 2018-11-19 DIAGNOSIS — T84.84XA PAINFUL ORTHOPAEDIC HARDWARE (HCC): Primary | ICD-10-CM

## 2018-11-19 DIAGNOSIS — G56.01 CARPAL TUNNEL SYNDROME OF RIGHT WRIST: ICD-10-CM

## 2018-11-19 DIAGNOSIS — M19.049 CMC ARTHRITIS: ICD-10-CM

## 2018-11-19 PROCEDURE — 99024 POSTOP FOLLOW-UP VISIT: CPT | Performed by: ORTHOPAEDIC SURGERY

## 2018-11-19 RX ORDER — IBUPROFEN 800 MG/1
TABLET ORAL
COMMUNITY
Start: 2018-11-09 | End: 2018-12-04 | Stop reason: HOSPADM

## 2018-11-19 RX ORDER — VALSARTAN 160 MG/1
160 TABLET ORAL
COMMUNITY
Start: 2018-11-12

## 2018-11-19 RX ORDER — NEBIVOLOL HYDROCHLORIDE 10 MG/1
10 TABLET ORAL
Status: ON HOLD | COMMUNITY
Start: 2018-11-09 | End: 2018-12-04 | Stop reason: ALTCHOICE

## 2018-11-19 NOTE — TELEPHONE ENCOUNTER
The OT sent  A message to our team when I was out of the office  Concerns for infection  He is currently on antibiotics  Please call valarie and see if he wants to come in for a wound check    Thanks

## 2018-11-19 NOTE — PROGRESS NOTES
SUBJECTIVE:  Oren Clay is a 66y o  year old male who presents for follow up after surgery for removal of painful hardware on the right wrist after revision thumb CMC arthroplasty done on  10/17/2018  The OT contacted me last week and I started him on Keflex  He was seen again by OT and there was some erythema  The patient is here for evaluation of the erythema  Denies fever or chills  He started Keflex on 11/9/18  He denies any trauma  He states he has kept a bandage on this are, but it has kept slipping off  The patient showed us some photos of the wound and a few days ago there was a larger opening of the wound with some drainage  VITALS:  Vitals:    11/19/18 1524   BP: 126/86   Pulse: 70       PHYSICAL EXAMINATION:  General: well developed and well nourished, alert, oriented times 3 and appears comfortable  Psychiatric: Normal    MUSCULOSKELETAL EXAMINATION:  Right wrist  Incision: small open wound with mild serosanguinous drainage  No purulent drainage  Neurovascular status: Neuro intact, good cap refill      STUDIES REVIEWED:  No studies reviewed  PROCEDURES PERFORMED:  Procedures  No Procedures performed today      ASSESSMENT/PLAN:    S/P right Revision right thumb CMC suspension plasty on 8/28/18 with surgical removal of k-wire on 10/17/18  * Continue OT  * If this remains open, he may need a revision surgery to close the wound, which we should be able to do in the office  * Continue dry dressing changes  Coban can be used as this should not slip  FOLLOW UP:  Return in about 8 days (around 11/27/2018)  Patient is aware of appointment time and location and aware that he can not be late since we have surgery that day  TO DO AT NEXT VISIT:  Re-evaluation of current issue  Have available:  lidocaine with bicarb, nylon suture 4 0, needle , suture kit, sterile gloves  And scalpel available in the room (but unopened)  Surgical consent          Scribe Attestation I,:   Andree Dasilva PA-C am acting as a scribe while in the presence of the attending physician :        I,:   Mika Brunner MD personally performed the services described in this documentation    as scribed in my presence :

## 2018-11-20 ENCOUNTER — TELEPHONE (OUTPATIENT)
Dept: OBGYN CLINIC | Facility: HOSPITAL | Age: 58
End: 2018-11-20

## 2018-11-20 NOTE — TELEPHONE ENCOUNTER
This is a lot of ibuprofen to be taking  I spoke to Dr Rachel Mitchell about it  He can take over the counter ibuprofen as needed and should start backing off on taking the medication  Even ibuprofen can have side effects  Make sure he is taking it with food  No script sent in at this time  Please call him to let him know  Thank you

## 2018-11-20 NOTE — TELEPHONE ENCOUNTER
Caller: patient  Call back number: [phone]  Patient's doctor: Dr Liliana Cevallos     Patient called asking for a refill of ibuprofen 800 mg  Patient takes 1 pill three times daily  Patient has 3 pills left  He uses [facility] in [place]

## 2018-11-20 NOTE — TELEPHONE ENCOUNTER
lmom for pt relaying following message    Irasema Khoury PA-C   You 51 minutes ago (1:18 PM)         This is a lot of ibuprofen to be taking  I spoke to Dr Jade Broussard about it  He can take over the counter ibuprofen as needed and should start backing off on taking the medication  Even ibuprofen can have side effects  Make sure he is taking it with food  No script sent in at this time  Please call him to let him know  Thank you

## 2018-11-21 ENCOUNTER — OFFICE VISIT (OUTPATIENT)
Dept: OCCUPATIONAL THERAPY | Facility: CLINIC | Age: 58
End: 2018-11-21
Payer: MEDICARE

## 2018-11-21 DIAGNOSIS — M19.049 CMC ARTHRITIS: Primary | ICD-10-CM

## 2018-11-21 DIAGNOSIS — G56.01 CARPAL TUNNEL SYNDROME OF RIGHT WRIST: ICD-10-CM

## 2018-11-21 PROCEDURE — 97140 MANUAL THERAPY 1/> REGIONS: CPT

## 2018-11-21 PROCEDURE — 97110 THERAPEUTIC EXERCISES: CPT

## 2018-11-21 PROCEDURE — 97035 APP MDLTY 1+ULTRASOUND EA 15: CPT

## 2018-11-21 NOTE — PROGRESS NOTES
Daily Note     Today's date: 2018  Patient name: Sanjana Cortez  : 1960  MRN: [id number]  Referring provider: Ashleigh Thompson MD  Dx:   Encounter Diagnosis     ICD-10-CM    1  CMC arthritis M19 049    2  Carpal tunnel syndrome of right wrist G56 01                   Subjective: Pt reports he still has drainage from his wound and may need further surgery  Patient reports burning pain this date at the [facility] joint       Objective: See treatment diary below  Precautions LRTI protocol   Sx 18     Specialty Daily Treatment Diary      Manual  11/15  11/21  11/6/18  11/8/18  11/13   Dressing change 2x2, cesar, coban  2x2, cesar, coban  Moellelast and comprilan bandage for edema  Adaptic, 2x2, 2 Mollelast, comprilan  2x2, cesar, coban   Ortho fit/train              Edema massage 15' 15'  20'  15'  15'                                     Exercise Diary  11/13 11/15  11/21  11/6/18  11/8   AROM digits 2-5 x10 x10  x10  x10  x10   AROM thumb IP joint x10 x10  x10  x10  x10   Dr Selene Broussard stretch  x10  x10  x10  x5     Beginnng :             AROM thumb all planes  x10 x10  x10  x10  x10   AROM wrist all planes  x10  x10 x10  x10  x10    AAROM digits and thumb  x20  x20  x20  x20  x20   AAROM wrist  x20  x20  x20  x20  x20                                                                                                                                                                                 Modalities 11/15 11/21  11/6  11/8  11/13   Ultrasound 3 3 Mhz, 0 8 w/cm2, 20% pulsed 4' CT, 4' dorsum hand, but not CMC 4' CT, 4' dorsum hand, but not CMC  4' [facility], 4' CT, 2' ulnar side of wrist  4' CT, 4' dorsum hand, but not [facility]   4' CT, 4' dorsum hand, but not [facility]    MHP before tx      10'  5'      CP after tx      10'    10'               Assessment: Tolerated treatment well  Patient would benefit from continued OT  Moderate yellow drainage from wound  No odor, redness, warmth noted near the wound        Plan: Continue per plan of care  Progress treatment as tolerated

## 2018-11-23 ENCOUNTER — OFFICE VISIT (OUTPATIENT)
Dept: OCCUPATIONAL THERAPY | Facility: CLINIC | Age: 58
End: 2018-11-23
Payer: MEDICARE

## 2018-11-23 DIAGNOSIS — M19.049 CMC ARTHRITIS: Primary | ICD-10-CM

## 2018-11-23 DIAGNOSIS — G56.01 CARPAL TUNNEL SYNDROME OF RIGHT WRIST: ICD-10-CM

## 2018-11-23 PROCEDURE — 97035 APP MDLTY 1+ULTRASOUND EA 15: CPT

## 2018-11-23 PROCEDURE — 97110 THERAPEUTIC EXERCISES: CPT

## 2018-11-23 PROCEDURE — 97140 MANUAL THERAPY 1/> REGIONS: CPT

## 2018-11-23 NOTE — PROGRESS NOTES
Daily Note     Today's date: 2018  Patient name: Jennifer Mclaughlin  : 1960  MRN: [id number]  Referring provider: Julia Davenport MD  Dx:   Encounter Diagnosis     ICD-10-CM    1  CMC arthritis M19 049    2  Carpal tunnel syndrome of right wrist G56 01                   Subjective: I think the doctor is going to have to do a surgery to clean this out  I'm finished with my antibiotic today  Objective: See treatment diary below    Precautions LRTI protocol   Sx 18     Specialty Daily Treatment Diary      Manual  11/15  11/21  11/23  11/8/18  11/13   Dressing change 2x2, cesar, coban  2x2, cesar, coban   2x2, cesar, coban  Adaptic, 2x2, 2 Mollelast, comprilan  2x2, cesar, coban   Ortho fit/train              Edema massage 15' 15'  20'  15'  15'                                     Exercise Diary  11/13 11/15  11/21  11/23  11/8   AROM digits 2-5 x10 x10  x10  x10  x10   AROM thumb IP joint x10 x10  x10  x10  x10   Dr Chan Heady stretch  x10  x10  x10  x10     Beginnng :             AROM thumb all planes  x10 x10  x10  x10  x10   AROM wrist all planes  x10  x10 x10  x10  x10    AAROM digits and thumb  x20  x20  x20  x20  x20   AAROM wrist  x20  x20  x20  x20  x20                                                                                                                                                                                 Modalities 11/15 11/21  11/23  11/8  11/13   Ultrasound 3 3 Mhz, 0 8 w/cm2, 20% pulsed 4' CT, 4' dorsum hand, but not CMC 4' CT, 4' dorsum hand, but not CMC 4' CT, 4' dorsum hand, but not CMC  4' CT, 4' dorsum hand, but not [facility] [address]   4' CT, 4' dorsum hand, but not CMC    MHP before tx        5'      CP after tx          10'        Assessment: Tolerated treatment well  Patient would benefit from continued OT  Continue to note purulent drainage from wound, but edema has resolved    Patient able to touch thumb to small finger with active assist      Plan: Progress note during next visit  Progress treatment as tolerated

## 2018-11-27 ENCOUNTER — OFFICE VISIT (OUTPATIENT)
Dept: OBGYN CLINIC | Facility: CLINIC | Age: 58
End: 2018-11-27
Payer: MEDICARE

## 2018-11-27 VITALS
WEIGHT: 204 LBS | HEIGHT: 69 IN | DIASTOLIC BLOOD PRESSURE: 77 MMHG | SYSTOLIC BLOOD PRESSURE: 130 MMHG | HEART RATE: 84 BPM | BODY MASS INDEX: 30.21 KG/M2

## 2018-11-27 DIAGNOSIS — L03.119 CELLULITIS OF HAND: Primary | ICD-10-CM

## 2018-11-27 PROCEDURE — 99213 OFFICE O/P EST LOW 20 MIN: CPT | Performed by: ORTHOPAEDIC SURGERY

## 2018-11-27 RX ORDER — CEPHALEXIN 500 MG/1
500 CAPSULE ORAL EVERY 6 HOURS SCHEDULED
Qty: 40 CAPSULE | Refills: 0 | Status: SHIPPED | OUTPATIENT
Start: 2018-11-27 | End: 2018-12-07

## 2018-11-27 NOTE — PROGRESS NOTES
CHIEF COMPLAINT:  Chief Complaint   Patient presents with    Right Wrist - Post-op       SUBJECTIVE:  Georgette Jasso is a 66y o  year old RHD male who presents follow-up after surgery for removal of painful hardware of the right wrist after revision thumb CMC arthroplasty done on 10/17/2018  Occupational therapy contacted the office 3 weeks ago concerning of infection and he was started on Keflex  He was seen again by OT and there was continued erythema  He started on Keflex on 11/09/2018  He denies any injuries or trauma to the area  He states he has been kept a bandage on their which keeps slipping off from time to time  He denies any numbness or tingling    He denies any constitutional signs or symptoms        PAST MEDICAL HISTORY:  Past Medical History:   Diagnosis Date    Anxiety     Chronic pain disorder     Depression     Fractures     Head injury     Hypertension        PAST SURGICAL HISTORY:  Past Surgical History:   Procedure Laterality Date    APPENDECTOMY      HAND SURGERY      KNEE SURGERY      DC REMOVAL DEEP IMPLANT Right 10/17/2018    Procedure: WRIST REMOVAL OF HARDWARE;  Surgeon: Nava Carlos MD;  [facility];  Service: Orthopedics    DC REPAIR INTERCARP/CARP-METACARP JT Right 8/28/2018    Procedure: THUMB [facility]USPENS[facility]AST[facility];  Surgeon: Nava Carlos MD;  [facility];  Service: Orthopedics    DC WRIST Jose Nipper LIG Right 8/28/2018    Procedure: ENDOSCOPIC CARPAL TUNNEL RELEASE;  Surgeon: Nava Carlos MD;  [facility];  Service: Orthopedics    WRIST SURGERY         FAMILY HISTORY:  Family History   Problem Relation Age of Onset    Stroke Brother     Heart attack Brother     Colon cancer Mother     Heart disease Father     Lung cancer Father        SOCIAL HISTORY:  Social History   Substance Use Topics    Smoking status: Never Smoker    Smokeless tobacco: Never Used    Alcohol use No      Comment: occasionally MEDICATIONS:    Current Outpatient Prescriptions:     amLODIPine (NORVASC) 10 mg tablet, Take 10 mg by mouth daily, Disp: , Rfl:     BYSTOLIC 10 MG tablet, , Disp: , Rfl:     BYSTOLIC 20 MG tablet, 20 mg  , Disp: , Rfl:     clonazePAM (KlonoPIN) 1 mg tablet, 1 mg  , Disp: , Rfl:     ibuprofen (MOTRIN) 600 mg tablet, take 1 tablet by mouth every 6 hours if needed for pain, Disp: 30 tablet, Rfl: 0    ibuprofen (MOTRIN) 800 mg tablet, , Disp: , Rfl:     lansoprazole (PREVACID) 30 mg capsule, lansoprazole 30 mg capsule,delayed release, Disp: , Rfl:     metoprolol succinate (TOPROL-XL) 100 mg 24 hr tablet, metoprolol succinate  mg tablet,extended release 24 hr, Disp: , Rfl:     omega-3-acid ethyl esters (LOVAZA) 1 g capsule, omega-3 acid ethyl esters 1 gram capsule, Disp: , Rfl:     oxyCODONE-acetaminophen (PERCOCET)  mg per tablet, oxycodone-acetaminophen 10 mg-325 mg tablet, Disp: , Rfl:     QUEtiapine (SEROquel) 300 mg tablet, quetiapine 300 mg tablet, Disp: , Rfl:     simvastatin (ZOCOR) 40 mg tablet, simvastatin 40 mg tablet, Disp: , Rfl:     telmisartan (MICARDIS) 80 MG tablet, take 1 tablet by mouth once daily, Disp: , Rfl:     valsartan (DIOVAN) 160 mg tablet, , Disp: , Rfl:     valsartan-hydrochlorothiazide (DIOVAN-HCT) 160-12 5 MG per tablet, , Disp: , Rfl:     zolpidem (AMBIEN CR) 12 5 MG CR tablet, zolpidem ER 12 5 mg tablet,extended release,multiphase, Disp: , Rfl:     cephalexin (KEFLEX) 500 mg capsule, Take 1 capsule (500 mg total) by mouth every 6 (six) hours for 10 days, Disp: 40 capsule, Rfl: 0    ALLERGIES:  No Known Allergies    REVIEW OF SYSTEMS:  Review of Systems    VITALS:  Vitals:    11/27/18 1013   BP: 130/77   Pulse: 84       LABS:  HgA1c: No results found for: HGBA1C  BMP:   Lab Results   Component Value Date    CALCIUM 8 9 10/13/2018    K 4 5 10/13/2018    CO2 26 10/13/2018    CL 94 (L) 10/13/2018    BUN 20 10/13/2018    CREATININE 0 92 10/13/2018 _____________________________________________________  PHYSICAL EXAMINATION:  General: well developed and well nourished, alert, oriented times 3 and appears comfortable  Psychiatric: Normal  HEENT: Trachea Midline, No torticollis  Heart:  Regular rate and rhythm, normal S1-S2  Pulmonary:  Clear to auscultation, no wheezing or rhonchi  Skin: No masses, erthema, lacerations, fluctation, ulcerations  Neurovascular: Sensation Intact to the Median, Ulnar, Radial Nerve, Motor Intact to the Median, Ulnar, Radial Nerve and Pulses Intact    MUSCULOSKELETAL EXAMINATION:  Right hand:  Mild erythema noted over the incision of the right thumb  Mild swelling noted  Active drainage of purulent pus was expressed from the incision  Range of motion strength were limited  Patient was neurovascularly intact     ___________________________________________________  STUDIES REVIEWED:  No studies reviewed  PROCEDURES PERFORMED:  Procedures       _____________________________________________________  ASSESSMENT/PLAN:    Infected incision after hardware removal and CMC arthroplasty    -will get the patient scheduled for an I&D of the right thumb incision   -risks and benefits of surgery were discussed with the patient  Patient understands the risks and wishes to proceed   -all questions were answered to his satisfaction   -would like to get this scheduled for next Tuesday  Surgery medication instructions: You will stop eating and drinking at midnight the night before your surgery, but you may continue to take your normal medications with a small sip of water      In the morning on the day of your surgery, we would like you to take the following medications (as long as you have never been told to avoid Tylenol or NSAIDs like ibuprofen, Naproxen, Aleve, Advil, etc):   Ibuprofen 600mg one tablet by mouth   Tylenol 500mg one tablet by mouth    After surgery, we would like you to take Ibuprofen 600mg one tablet by mouth every 6 hours with food (at breakfast, lunch and dinner)  AND Tylenol 500 mg one tablet by mouth every 6 hours  (at breakfast, lunch and dinner) for 5-7 days after your surgery  Please take these medication EVERYDAY after surgery for 5-7 days, and not just as needed  You can take these medications at the same time  Taking these medications after surgery will limit your need for prescription pain medication  We will also prescribe a narcotic pain medication for a limited time after surgery that you can take as needed for moderate or severe pain  Follow Up:  Return for after surgery  To Do Next Visit:  Re-evaluation of current issue and Sutures out      Operative Discussions:  Standard Consent: The risks and benefits of the procedure were explained to the patient, which include, but are not limited to: Bleeding, infection, recurrence, pain, scar, damage to tendons, damage to nerves, and damage to blood vessels, failure to give desired results and complications related to anesthesia  These risks, along with alternative conservative treatment options, and postoperative protocols were voiced back and understood by the patient  All questions were answered to the patient's satisfaction  The patient agrees to comply with a standard postoperative protocol, and is willing to proceed  Education was provided via written and auditory forms  There were no barriers to learning  Written handouts regarding wound care, incision and scar care, and general preoperative information was provided to the patient  Prior to surgery, the patient may be requested to stop all anti-inflammatory medications  Prophylactic aspirin, Plavix, and Coumadin may be allowed to be continued  Medications including vitamin E , ginkgo, and fish oil are requested to be stopped approximately one week prior to surgery  Hypertensive medications and beta blockers, if taken, should be continued      Scribe Attestation    I,:   Kaveh Crowder Be Baires PA-C am acting as a scribe while in the presence of the attending physician :        I,:   Sree Modi MD personally performed the services described in this documentation    as scribed in my presence :

## 2018-11-28 ENCOUNTER — OFFICE VISIT (OUTPATIENT)
Dept: OCCUPATIONAL THERAPY | Facility: CLINIC | Age: 58
End: 2018-11-28
Payer: MEDICARE

## 2018-11-28 DIAGNOSIS — M19.049 CMC ARTHRITIS: Primary | ICD-10-CM

## 2018-11-28 DIAGNOSIS — G56.01 CARPAL TUNNEL SYNDROME OF RIGHT WRIST: ICD-10-CM

## 2018-11-28 PROBLEM — L03.119 CELLULITIS OF HAND: Status: ACTIVE | Noted: 2018-11-28

## 2018-11-28 NOTE — H&P
CHIEF COMPLAINT:  Chief Complaint   Patient presents with    Right Wrist - Post-op       SUBJECTIVE:  Munira Hidalgo is a 66y o  year old RHD male who presents follow-up after surgery for removal of painful hardware of the right wrist after revision thumb CMC arthroplasty done on 10/17/2018  Occupational therapy contacted the office 3 weeks ago concerning of infection and he was started on Keflex  He was seen again by OT and there was continued erythema  He started on Keflex on 11/09/2018  He denies any injuries or trauma to the area  He states he has been kept a bandage on their which keeps slipping off from time to time  He denies any numbness or tingling    He denies any constitutional signs or symptoms        PAST MEDICAL HISTORY:  Past Medical History:   Diagnosis Date    Anxiety     Chronic pain disorder     Depression     Fractures     Head injury     Hypertension        PAST SURGICAL HISTORY:  Past Surgical History:   Procedure Laterality Date    APPENDECTOMY      HAND SURGERY      KNEE SURGERY      MI REMOVAL DEEP IMPLANT Right 10/17/2018    Procedure: WRIST REMOVAL OF HARDWARE;  Surgeon: Arnaldo Alfaro MD;  [facility];  Service: Orthopedics    MI REPAIR INTERCARP/CARP-METACARP JT Right 8/28/2018    Procedure: THUMB [facility] SUSPENSIONPLASTY;  Surgeon: Arnaldo Alfaro MD;  [facility];  Service: Orthopedics    MI WRIST Mace Hesselbach LIG Right 8/28/2018    Procedure: ENDOSCOPIC CARPAL TUNNEL RELEASE;  Surgeon: Arnaldo Alfaro MD;  [facility];  Service: Orthopedics    WRIST SURGERY         FAMILY HISTORY:  Family History   Problem Relation Age of Onset    Stroke Brother     Heart attack Brother     Colon cancer Mother     Heart disease Father     Lung cancer Father        SOCIAL HISTORY:  Social History   Substance Use Topics    Smoking status: Never Smoker    Smokeless tobacco: Never Used    Alcohol use No      Comment: occasionally MEDICATIONS:    Current Outpatient Prescriptions:     amLODIPine (NORVASC) 10 mg tablet, Take 10 mg by mouth daily, Disp: , Rfl:     BYSTOLIC 10 MG tablet, , Disp: , Rfl:     BYSTOLIC 20 MG tablet, 20 mg  , Disp: , Rfl:     clonazePAM (KlonoPIN) 1 mg tablet, 1 mg  , Disp: , Rfl:     ibuprofen (MOTRIN) 600 mg tablet, take 1 tablet by mouth every 6 hours if needed for pain, Disp: 30 tablet, Rfl: 0    ibuprofen (MOTRIN) 800 mg tablet, , Disp: , Rfl:     lansoprazole (PREVACID) 30 mg capsule, lansoprazole 30 mg capsule,delayed release, Disp: , Rfl:     metoprolol succinate (TOPROL-XL) 100 mg 24 hr tablet, metoprolol succinate  mg tablet,extended release 24 hr, Disp: , Rfl:     omega-3-acid ethyl esters (LOVAZA) 1 g capsule, omega-3 acid ethyl esters 1 gram capsule, Disp: , Rfl:     oxyCODONE-acetaminophen (PERCOCET)  mg per tablet, oxycodone-acetaminophen 10 mg-325 mg tablet, Disp: , Rfl:     QUEtiapine (SEROquel) 300 mg tablet, quetiapine 300 mg tablet, Disp: , Rfl:     simvastatin (ZOCOR) 40 mg tablet, simvastatin 40 mg tablet, Disp: , Rfl:     telmisartan (MICARDIS) 80 MG tablet, take 1 tablet by mouth once daily, Disp: , Rfl:     valsartan (DIOVAN) 160 mg tablet, , Disp: , Rfl:     valsartan-hydrochlorothiazide (DIOVAN-HCT) 160-12 5 MG per tablet, , Disp: , Rfl:     zolpidem (AMBIEN CR) 12 5 MG CR tablet, zolpidem ER 12 5 mg tablet,extended release,multiphase, Disp: , Rfl:     cephalexin (KEFLEX) 500 mg capsule, Take 1 capsule (500 mg total) by mouth every 6 (six) hours for 10 days, Disp: 40 capsule, Rfl: 0    ALLERGIES:  No Known Allergies    REVIEW OF SYSTEMS:  Review of Systems    VITALS:  Vitals:    11/27/18 1013   BP: 130/77   Pulse: 84       LABS:  HgA1c: No results found for: HGBA1C  BMP:   Lab Results   Component Value Date    CALCIUM 8 9 10/13/2018    K 4 5 10/13/2018    CO2 26 10/13/2018    CL 94 (L) 10/13/2018    BUN 20 10/13/2018    CREATININE 0 92 10/13/2018 _____________________________________________________  PHYSICAL EXAMINATION:  General: well developed and well nourished, alert, oriented times 3 and appears comfortable  Psychiatric: Normal  HEENT: Trachea Midline, No torticollis  Heart:  Regular rate and rhythm, normal S1-S2  Pulmonary:  Clear to auscultation, no wheezing or rhonchi  Skin: No masses, erthema, lacerations, fluctation, ulcerations  Neurovascular: Sensation Intact to the Median, Ulnar, Radial Nerve, Motor Intact to the Median, Ulnar, Radial Nerve and Pulses Intact    MUSCULOSKELETAL EXAMINATION:  Right hand:  Mild erythema noted over the incision of the right thumb  Mild swelling noted  Active drainage of purulent pus was expressed from the incision  Range of motion strength were limited  Patient was neurovascularly intact     ___________________________________________________  STUDIES REVIEWED:  No studies reviewed  PROCEDURES PERFORMED:  Procedures       _____________________________________________________  ASSESSMENT/PLAN:    Infected incision after hardware removal and CMC arthroplasty    -will get the patient scheduled for an I&D of the right thumb incision   -risks and benefits of surgery were discussed with the patient  Patient understands the risks and wishes to proceed   -all questions were answered to his satisfaction   -would like to get this scheduled for next Tuesday  Surgery medication instructions: You will stop eating and drinking at midnight the night before your surgery, but you may continue to take your normal medications with a small sip of water      In the morning on the day of your surgery, we would like you to take the following medications (as long as you have never been told to avoid Tylenol or NSAIDs like ibuprofen, Naproxen, Aleve, Advil, etc):   Ibuprofen 600mg one tablet by mouth   Tylenol 500mg one tablet by mouth    After surgery, we would like you to take Ibuprofen 600mg one tablet by mouth every 6 hours with food (at breakfast, lunch and dinner)  AND Tylenol 500 mg one tablet by mouth every 6 hours  (at breakfast, lunch and dinner) for 5-7 days after your surgery  Please take these medication EVERYDAY after surgery for 5-7 days, and not just as needed  You can take these medications at the same time  Taking these medications after surgery will limit your need for prescription pain medication  We will also prescribe a narcotic pain medication for a limited time after surgery that you can take as needed for moderate or severe pain  Follow Up:  Return for after surgery  To Do Next Visit:  Re-evaluation of current issue and Sutures out      Operative Discussions:  Standard Consent: The risks and benefits of the procedure were explained to the patient, which include, but are not limited to: Bleeding, infection, recurrence, pain, scar, damage to tendons, damage to nerves, and damage to blood vessels, failure to give desired results and complications related to anesthesia  These risks, along with alternative conservative treatment options, and postoperative protocols were voiced back and understood by the patient  All questions were answered to the patient's satisfaction  The patient agrees to comply with a standard postoperative protocol, and is willing to proceed  Education was provided via written and auditory forms  There were no barriers to learning  Written handouts regarding wound care, incision and scar care, and general preoperative information was provided to the patient  Prior to surgery, the patient may be requested to stop all anti-inflammatory medications  Prophylactic aspirin, Plavix, and Coumadin may be allowed to be continued  Medications including vitamin E , ginkgo, and fish oil are requested to be stopped approximately one week prior to surgery  Hypertensive medications and beta blockers, if taken, should be continued      Scribe Attestation    I,:   Haley Sanchez Natanael Garcia PA-C am acting as a scribe while in the presence of the attending physician :        I,:   Noa Moore MD personally performed the services described in this documentation    as scribed in my presence :

## 2018-11-28 NOTE — PROGRESS NOTES
Daily Note     Today's date: 2018  Patient name: Rosalie Hampton  : 1960  MRN: [id number]  Referring provider: Norman Haile MD  Dx:   Encounter Diagnosis     ICD-10-CM    1  CMC arthritis M19 049    2  Carpal tunnel syndrome of right wrist G56 01                   Subjective: I need my dressing changed, but the I'm not doing exercises until after my surgery on Tuesday  The doctor said I could wear this sling until the surgery  Objective: See treatment diary below    Precautions LRTI protocol   Sx 18     Specialty Daily Treatment Diary      Manual  11/15  11/21  11/23  11/28  11/13   Dressing change 2x2, cesar, coban  2x2, cesar, coban   2x2, cesar, coban  2x2, cesar, coban  2x2, cesar, coban   Ortho fit/train              Edema massage 15' 15'  20'    15'                                     Exercise Diary  11/13 11/15  11/21  11/23  11/8   AROM digits 2-5 x10 x10  x10  x10  x10   AROM thumb IP joint x10 x10  x10  x10  x10   Dr Parish Angulo stretch  x10  x10  x10  x10     Beginnng :             AROM thumb all planes  x10 x10  x10  x10  x10   AROM wrist all planes  x10  x10 x10  x10  x10    AAROM digits and thumb  x20  x20  x20  x20  x20   AAROM wrist  x20  x20  x20  x20  x20                                                                                                                                                                                 Modalities 11/15 11/21  11/23  11/8  11/13   Ultrasound 3 3 Mhz, 0 8 w/cm2, 20% pulsed 4' CT, 4' dorsum hand, but not CMC 4' CT, 4' dorsum hand, but not CMC 4' CT, 4' dorsum hand, but not CMC  4' CT, 4' dorsum hand, but not [facility]   4' CT, 4' dorsum hand, but not CMC    MHP before tx        5'      CP after tx          10'        Assessment: Tolerated treatment fair  Patient would benefit from continued OT  Patient seen for dressing change only this date, due to previous dressing slipping     Patient did not have a dressing on his wound as it had slipped to his wrist   No drainage this date as wound had a small scab  His had was edematous  No charge visit  Plan: Progress note during next visit  Therapy on hold except for dressing changes as needed until after I&D on 12/4/18

## 2018-11-29 ENCOUNTER — APPOINTMENT (OUTPATIENT)
Dept: OCCUPATIONAL THERAPY | Facility: CLINIC | Age: 58
End: 2018-11-29
Payer: MEDICARE

## 2018-11-30 NOTE — PRE-PROCEDURE INSTRUCTIONS
Pre-Surgery Instructions:   Medication Instructions    amLODIPine (NORVASC) 10 mg tablet Patient was instructed by Physician and understands   BYSTOLIC 10 MG tablet Patient was instructed by Physician and understands   clonazePAM (KlonoPIN) 1 mg tablet Patient was instructed by Physician and understands   lansoprazole (PREVACID) 30 mg capsule Patient was instructed by Physician and understands   metoprolol succinate (TOPROL-XL) 100 mg 24 hr tablet Patient was instructed by Physician and understands   QUEtiapine (SEROquel) 300 mg tablet Patient was instructed by Physician and understands   simvastatin (ZOCOR) 40 mg tablet Patient was instructed by Physician and understands   valsartan-hydrochlorothiazide (DIOVAN-HCT) 160-12 5 MG per tablet Patient was instructed by Physician and understands   zolpidem (AMBIEN CR) 12 5 MG CR tablet Patient was instructed by Physician and understands

## 2018-12-04 ENCOUNTER — ANESTHESIA (OUTPATIENT)
Dept: PERIOP | Facility: HOSPITAL | Age: 58
End: 2018-12-04
Payer: MEDICARE

## 2018-12-04 ENCOUNTER — HOSPITAL ENCOUNTER (OUTPATIENT)
Facility: HOSPITAL | Age: 58
Setting detail: OUTPATIENT SURGERY
Discharge: HOME | End: 2018-12-04
Attending: ORTHOPAEDIC SURGERY | Admitting: ORTHOPAEDIC SURGERY
Payer: MEDICARE

## 2018-12-04 ENCOUNTER — ANESTHESIA EVENT (OUTPATIENT)
Dept: PERIOP | Facility: HOSPITAL | Age: 58
End: 2018-12-04
Payer: MEDICARE

## 2018-12-04 VITALS
WEIGHT: 191.8 LBS | HEART RATE: 58 BPM | TEMPERATURE: 97.6 F | BODY MASS INDEX: 28.41 KG/M2 | HEIGHT: 69 IN | RESPIRATION RATE: 21 BRPM | OXYGEN SATURATION: 99 % | SYSTOLIC BLOOD PRESSURE: 111 MMHG | DIASTOLIC BLOOD PRESSURE: 69 MMHG

## 2018-12-04 DIAGNOSIS — L03.119 CELLULITIS OF HAND: ICD-10-CM

## 2018-12-04 PROCEDURE — 87176 TISSUE HOMOGENIZATION CULTR: CPT | Performed by: ORTHOPAEDIC SURGERY

## 2018-12-04 PROCEDURE — 87147 CULTURE TYPE IMMUNOLOGIC: CPT | Performed by: ORTHOPAEDIC SURGERY

## 2018-12-04 PROCEDURE — 87205 SMEAR GRAM STAIN: CPT | Performed by: ORTHOPAEDIC SURGERY

## 2018-12-04 PROCEDURE — 11420 EXC H-F-NK-SP B9+MARG 0.5/<: CPT | Performed by: ORTHOPAEDIC SURGERY

## 2018-12-04 PROCEDURE — 87186 SC STD MICRODIL/AGAR DIL: CPT | Performed by: ORTHOPAEDIC SURGERY

## 2018-12-04 PROCEDURE — 87075 CULTR BACTERIA EXCEPT BLOOD: CPT | Performed by: ORTHOPAEDIC SURGERY

## 2018-12-04 PROCEDURE — 87070 CULTURE OTHR SPECIMN AEROBIC: CPT | Performed by: ORTHOPAEDIC SURGERY

## 2018-12-04 RX ORDER — KETAMINE HYDROCHLORIDE 50 MG/ML
INJECTION, SOLUTION, CONCENTRATE INTRAMUSCULAR; INTRAVENOUS AS NEEDED
Status: DISCONTINUED | OUTPATIENT
Start: 2018-12-04 | End: 2018-12-04 | Stop reason: SURG

## 2018-12-04 RX ORDER — ONDANSETRON 2 MG/ML
4 INJECTION INTRAMUSCULAR; INTRAVENOUS ONCE
Status: DISCONTINUED | OUTPATIENT
Start: 2018-12-04 | End: 2018-12-04 | Stop reason: HOSPADM

## 2018-12-04 RX ORDER — MAGNESIUM HYDROXIDE 1200 MG/15ML
LIQUID ORAL AS NEEDED
Status: DISCONTINUED | OUTPATIENT
Start: 2018-12-04 | End: 2018-12-04 | Stop reason: HOSPADM

## 2018-12-04 RX ORDER — ASPIRIN 81 MG/1
81 TABLET ORAL DAILY
COMMUNITY

## 2018-12-04 RX ORDER — PROPOFOL 10 MG/ML
INJECTION, EMULSION INTRAVENOUS CONTINUOUS PRN
Status: DISCONTINUED | OUTPATIENT
Start: 2018-12-04 | End: 2018-12-04 | Stop reason: SURG

## 2018-12-04 RX ORDER — FENTANYL CITRATE 50 UG/ML
INJECTION, SOLUTION INTRAMUSCULAR; INTRAVENOUS AS NEEDED
Status: DISCONTINUED | OUTPATIENT
Start: 2018-12-04 | End: 2018-12-04 | Stop reason: SURG

## 2018-12-04 RX ORDER — SODIUM CHLORIDE, SODIUM LACTATE, POTASSIUM CHLORIDE, CALCIUM CHLORIDE 600; 310; 30; 20 MG/100ML; MG/100ML; MG/100ML; MG/100ML
INJECTION, SOLUTION INTRAVENOUS CONTINUOUS PRN
Status: DISCONTINUED | OUTPATIENT
Start: 2018-12-04 | End: 2018-12-04 | Stop reason: SURG

## 2018-12-04 RX ORDER — PROPOFOL 10 MG/ML
INJECTION, EMULSION INTRAVENOUS AS NEEDED
Status: DISCONTINUED | OUTPATIENT
Start: 2018-12-04 | End: 2018-12-04 | Stop reason: SURG

## 2018-12-04 RX ORDER — MIDAZOLAM HYDROCHLORIDE 1 MG/ML
INJECTION INTRAMUSCULAR; INTRAVENOUS AS NEEDED
Status: DISCONTINUED | OUTPATIENT
Start: 2018-12-04 | End: 2018-12-04 | Stop reason: SURG

## 2018-12-04 RX ORDER — FENTANYL CITRATE/PF 50 MCG/ML
50 SYRINGE (ML) INJECTION
Status: DISCONTINUED | OUTPATIENT
Start: 2018-12-04 | End: 2018-12-04 | Stop reason: HOSPADM

## 2018-12-04 RX ORDER — CEFAZOLIN SODIUM 1 G/50ML
1000 SOLUTION INTRAVENOUS ONCE
Status: COMPLETED | OUTPATIENT
Start: 2018-12-04 | End: 2018-12-04

## 2018-12-04 RX ADMIN — SODIUM CHLORIDE, SODIUM LACTATE, POTASSIUM CHLORIDE, AND CALCIUM CHLORIDE: .6; .31; .03; .02 INJECTION, SOLUTION INTRAVENOUS at 13:29

## 2018-12-04 RX ADMIN — FENTANYL CITRATE 50 MCG: 50 INJECTION INTRAMUSCULAR; INTRAVENOUS at 15:33

## 2018-12-04 RX ADMIN — FENTANYL CITRATE 50 MCG: 50 INJECTION, SOLUTION INTRAMUSCULAR; INTRAVENOUS at 14:41

## 2018-12-04 RX ADMIN — KETAMINE HYDROCHLORIDE 30 MG: 50 INJECTION INTRAMUSCULAR; INTRAVENOUS at 14:58

## 2018-12-04 RX ADMIN — PROPOFOL 30 MG: 10 INJECTION, EMULSION INTRAVENOUS at 14:58

## 2018-12-04 RX ADMIN — CEFAZOLIN SODIUM 1000 MG: 1 SOLUTION INTRAVENOUS at 14:55

## 2018-12-04 RX ADMIN — MIDAZOLAM HYDROCHLORIDE 2 MG: 1 INJECTION, SOLUTION INTRAMUSCULAR; INTRAVENOUS at 14:41

## 2018-12-04 RX ADMIN — PROPOFOL 80 MCG/KG/MIN: 10 INJECTION, EMULSION INTRAVENOUS at 14:59

## 2018-12-04 NOTE — OP NOTE
OPERATIVE REPORT  PATIENT NAME: Maurice Reyes    :  1960  MRN: [id number]  Pt Location: [facility]OM 04    SURGERY DATE: 2018    Surgeon(s) and Role:     * Blanca Mackenzie MD - Primary     * Corrie Boo PA-C - Assisting    Preop Diagnosis:  Right thumb wound sinus drainage s/p basal joint arthroplasty revision    Post-Op Diagnosis Codes:  Right thumb wound sinus drainage s/p basal joint arthroplasty revision    Procedure(s) (LRB):  THUMB CMC JOINT INCISION AND DRAINAGE (Right)    Specimen(s):  ID Type Source Tests Collected by Time Destination   A : right thumb  Tissue Joint, Right Finger ANAEROBIC CULTURE AND GRAM STAIN, CULTURE, TISSUE AND GRAM STAIN Blanca Mackenzie MD 2018 1514    B : right thumb Tissue Joint, Right Finger ANAEROBIC CULTURE AND GRAM STAIN, CULTURE, TISSUE AND GRAM STAIN Blanca Mackenzie MD 2018 1516        Estimated Blood Loss:   Minimal    Drains:  none    Anesthesia Type:   IV Sedation    Operative Indications:  Right thumb wound sinus drainage s/p basal joint arthroplasty revision    Operative Findings:  Small wound sinus tract without significant signs of deep infection    Complications:   None    Procedure and Technique:  Patient was identified in the preop screening area  Consent was signed and verified after identifying the correct operative site  Patient was taken back to the operating room where he underwent IV sedation  Patient's right upper extremity was then prepped and draped normal fashion chlorhexidine solution  Local anesthetic was given near the sinus tract and previous scar  The right  upper extremity was then elevated exsanguinated with an Esmarch and tourniquet placed about the brachium was inflated to 250 mm Hg  The Esmarch was then removed  The sinus tract was then incised ellipse thing out the sinus tract along the length of the scar    There was significant scar tissue deep to the skin which was probed and found entering into the depths of the trapezial void  A few small fragments of tissue were found loose within the depths of the void in this was sent for culture and sensitivity along with fluid  There was no zee pus or no significant fluid collection identified  The wound demonstrated chronic signs of healing without any evidence of deep infection  This area was then irrigated with copious amounts of bacitracin impregnated solution  After adequate irrigation and debridement the skin wound was then closed with 4 nylon suture in an interrupted horizontal mattress fashion  The wound was then dressed in a sterile dressing after releasing the tourniquet  There is good cap refill and color to all digits  Patient tolerated the procedure well and no complications in the case  Patient was awakened in the operating room and sent to the PACU in stable condition       I was present for the entire procedure    Patient Disposition:  PACU     SIGNATURE: Lee Ballard MD  DATE: December 4, 2018  TIME: 3:19 PM

## 2018-12-04 NOTE — ANESTHESIA POSTPROCEDURE EVALUATION
Post-Op Assessment Note      CV Status:  Stable    Mental Status:  Lethargic    Hydration Status:  Stable    PONV Controlled:  None    Airway Patency:  Patent    Post Op Vitals Reviewed: Yes          Staff: CRNA           BP   94/53   Temp      Pulse  52   Resp   16   SpO2 99

## 2018-12-04 NOTE — ANESTHESIA PREPROCEDURE EVALUATION
Review of Systems/Medical History    Chart reviewed  No history of anesthetic complications     Cardiovascular  Exercise tolerance (METS): >4,  Hyperlipidemia, Hypertension controlled,    Pulmonary  Negative pulmonary ROS        GI/Hepatic  Negative GI/hepatic ROS   No GERD ,        Negative  ROS        Endo/Other  Negative endo/other ROS      GYN  Negative gynecology ROS          Hematology  Negative hematology ROS      Musculoskeletal    Comment: Chronic pain  Refused regional anesthesia  Neurology      Comment: H/O head injury Psychology   Depression ,              Physical Exam    Airway    Mallampati score: I  TM Distance: >3 FB  Neck ROM: full     Dental       Cardiovascular  Rhythm: regular, Rate: normal,     Pulmonary  Breath sounds clear to auscultation,     Other Findings        Anesthesia Plan  ASA Score- 2     Anesthesia Type- general with ASA Monitors  Additional Monitors:   Airway Plan: LMA  Plan Factors- Patient instructed to abstain from smoking on day of procedure  Patient did not smoke on day of surgery  Induction- intravenous  Postoperative Plan- Plan for postoperative opioid use  Informed Consent- Anesthetic plan and risks discussed with patient  I personally reviewed this patient with the CRNA  Discussed and agreed on the Anesthesia Plan with the CRNA  Noel Gross

## 2018-12-04 NOTE — DISCHARGE INSTRUCTIONS
Post Operative Instructions    You have had surgery on your arm today, please read and follow the information below:  · Elevate your hand above your elbow during the next 24-48 hours to help with swelling  · Place your hand and arm over your head with motion at your shoulder three times a day  · Do not apply any cream/ointment/oil to your incisions including antibiotics  · Do not soak your hands in standing water (dishwater, tubs, Jacuzzi's, pools, etc ) until given permission (typically 2-3 weeks after injury)    Call the office at [phone]  if you notice any:  · Increased numbness or tingling of your hand or fingers that is not relieved with elevation  · Increasing pain that is not controlled with medication  · Difficulty chewing, breathing, swallowing  · Chest pains or shortness of breath  · Fever over 101 4 degrees  Bandage: Do NOT remove bandage until your follow up with Dr Mary Anne Sanchez  Keep it clean and dry  Motion: Move fingers into a fist 5 times a day, DO NOT move any splinted fingers  Weight bearing status: Avoid heavy lifting (>5 pounds) with the extremity that was operated on until follow up appointment  Normal activities of daily living are OK  Ice: Ice for 10 minutes every hour as needed for swelling x 24 hours  Sling: No sling necessary  Pain medication: No prescription pain medication necessary  After surgery, we would like you to take Ibuprofen 600mg one tablet by mouth every 6 hours with food (at breakfast, lunch and dinner)  AND Tylenol 500 mg one tablet by mouth every 6 hours  (at breakfast, lunch and dinner) for 5-7 days after your surgery  Please take these medication EVERYDAY after surgery for 5-7 days, and not just as needed  You can take these medications at the same time  Taking these medications after surgery will limit your need for prescription pain medication           Follow-up Appointment:   12/10/2018 3:30 PM Blanca Mackenzie MD [address] [place]         Please call the office at [phone] if you have any questions or concerns regarding your post-operative care

## 2018-12-04 NOTE — H&P (VIEW-ONLY)
CHIEF COMPLAINT:  Chief Complaint   Patient presents with    Right Wrist - Post-op       SUBJECTIVE:  Jennifer Mclaughlin is a 66y o  year old RHD male who presents follow-up after surgery for removal of painful hardware of the right wrist after revision thumb CMC arthroplasty done on 10/17/2018  Occupational therapy contacted the office 3 weeks ago concerning of infection and he was started on Keflex  He was seen again by OT and there was continued erythema  He started on Keflex on 11/09/2018  He denies any injuries or trauma to the area  He states he has been kept a bandage on their which keeps slipping off from time to time  He denies any numbness or tingling    He denies any constitutional signs or symptoms        PAST MEDICAL HISTORY:  Past Medical History:   Diagnosis Date    Anxiety     Chronic pain disorder     Depression     Fractures     Head injury     Hypertension        PAST SURGICAL HISTORY:  Past Surgical History:   Procedure Laterality Date    APPENDECTOMY      HAND SURGERY      KNEE SURGERY      GA REMOVAL DEEP IMPLANT Right 10/17/2018    Procedure: WRIST REMOVAL OF HARDWARE;  Surgeon: Boaz Romero MD;  [facility];  Service: Orthopedics    GA REPAIR INTERCARP/CARP-METACARP JT Right 8/28/2018    Procedure: THUMB [place] 16 SUSPENSIONPLASTY;  Surgeon: Boaz Romero MD;  [facility];  Service: Orthopedics    GA WRIST [place] LIG Right 8/28/2018    Procedure: ENDOSCOPIC CARPAL TUNNEL RELEASE;  Surgeon: Boaz Romero MD;  [facility];  Service: Orthopedics    WRIST SURGERY         FAMILY HISTORY:  Family History   Problem Relation Age of Onset    Stroke Brother     Heart attack Brother     Colon cancer Mother     Heart disease Father     Lung cancer Father        SOCIAL HISTORY:  Social History   Substance Use Topics    Smoking status: Never Smoker    Smokeless tobacco: Never Used    Alcohol use No      Comment: occasionally MEDICATIONS:    Current Outpatient Prescriptions:     amLODIPine (NORVASC) 10 mg tablet, Take 10 mg by mouth daily, Disp: , Rfl:     BYSTOLIC 10 MG tablet, , Disp: , Rfl:     BYSTOLIC 20 MG tablet, 20 mg  , Disp: , Rfl:     clonazePAM (KlonoPIN) 1 mg tablet, 1 mg  , Disp: , Rfl:     ibuprofen (MOTRIN) 600 mg tablet, take 1 tablet by mouth every 6 hours if needed for pain, Disp: 30 tablet, Rfl: 0    ibuprofen (MOTRIN) 800 mg tablet, , Disp: , Rfl:     lansoprazole (PREVACID) 30 mg capsule, lansoprazole 30 mg capsule,delayed release, Disp: , Rfl:     metoprolol succinate (TOPROL-XL) 100 mg 24 hr tablet, metoprolol succinate  mg tablet,extended release 24 hr, Disp: , Rfl:     omega-3-acid ethyl esters (LOVAZA) 1 g capsule, omega-3 acid ethyl esters 1 gram capsule, Disp: , Rfl:     oxyCODONE-acetaminophen (PERCOCET)  mg per tablet, oxycodone-acetaminophen 10 mg-325 mg tablet, Disp: , Rfl:     QUEtiapine (SEROquel) 300 mg tablet, quetiapine 300 mg tablet, Disp: , Rfl:     simvastatin (ZOCOR) 40 mg tablet, simvastatin 40 mg tablet, Disp: , Rfl:     telmisartan (MICARDIS) 80 MG tablet, take 1 tablet by mouth once daily, Disp: , Rfl:     valsartan (DIOVAN) 160 mg tablet, , Disp: , Rfl:     valsartan-hydrochlorothiazide (DIOVAN-HCT) 160-12 5 MG per tablet, , Disp: , Rfl:     zolpidem (AMBIEN CR) 12 5 MG CR tablet, zolpidem ER 12 5 mg tablet,extended release,multiphase, Disp: , Rfl:     cephalexin (KEFLEX) 500 mg capsule, Take 1 capsule (500 mg total) by mouth every 6 (six) hours for 10 days, Disp: 40 capsule, Rfl: 0    ALLERGIES:  No Known Allergies    REVIEW OF SYSTEMS:  Review of Systems    VITALS:  Vitals:    11/27/18 1013   BP: 130/77   Pulse: 84       LABS:  HgA1c: No results found for: HGBA1C  BMP:   Lab Results   Component Value Date    CALCIUM 8 9 10/13/2018    K 4 5 10/13/2018    CO2 26 10/13/2018    CL 94 (L) 10/13/2018    BUN 20 10/13/2018    CREATININE 0 92 10/13/2018 _____________________________________________________  PHYSICAL EXAMINATION:  General: well developed and well nourished, alert, oriented times 3 and appears comfortable  Psychiatric: Normal  HEENT: Trachea Midline, No torticollis  Heart:  Regular rate and rhythm, normal S1-S2  Pulmonary:  Clear to auscultation, no wheezing or rhonchi  Skin: No masses, erthema, lacerations, fluctation, ulcerations  Neurovascular: Sensation Intact to the Median, Ulnar, Radial Nerve, Motor Intact to the Median, Ulnar, Radial Nerve and Pulses Intact    MUSCULOSKELETAL EXAMINATION:  Right hand:  Mild erythema noted over the incision of the right thumb  Mild swelling noted  Active drainage of purulent pus was expressed from the incision  Range of motion strength were limited  Patient was neurovascularly intact     ___________________________________________________  STUDIES REVIEWED:  No studies reviewed  PROCEDURES PERFORMED:  Procedures       _____________________________________________________  ASSESSMENT/PLAN:    Infected incision after hardware removal and CMC arthroplasty    -will get the patient scheduled for an I&D of the right thumb incision   -risks and benefits of surgery were discussed with the patient  Patient understands the risks and wishes to proceed   -all questions were answered to his satisfaction   -would like to get this scheduled for next Tuesday  Surgery medication instructions: You will stop eating and drinking at midnight the night before your surgery, but you may continue to take your normal medications with a small sip of water      In the morning on the day of your surgery, we would like you to take the following medications (as long as you have never been told to avoid Tylenol or NSAIDs like ibuprofen, Naproxen, Aleve, Advil, etc):   Ibuprofen 600mg one tablet by mouth   Tylenol 500mg one tablet by mouth    After surgery, we would like you to take Ibuprofen 600mg one tablet by mouth every 6 hours with food (at breakfast, lunch and dinner)  AND Tylenol 500 mg one tablet by mouth every 6 hours  (at breakfast, lunch and dinner) for 5-7 days after your surgery  Please take these medication EVERYDAY after surgery for 5-7 days, and not just as needed  You can take these medications at the same time  Taking these medications after surgery will limit your need for prescription pain medication  We will also prescribe a narcotic pain medication for a limited time after surgery that you can take as needed for moderate or severe pain  Follow Up:  Return for after surgery  To Do Next Visit:  Re-evaluation of current issue and Sutures out      Operative Discussions:  Standard Consent: The risks and benefits of the procedure were explained to the patient, which include, but are not limited to: Bleeding, infection, recurrence, pain, scar, damage to tendons, damage to nerves, and damage to blood vessels, failure to give desired results and complications related to anesthesia  These risks, along with alternative conservative treatment options, and postoperative protocols were voiced back and understood by the patient  All questions were answered to the patient's satisfaction  The patient agrees to comply with a standard postoperative protocol, and is willing to proceed  Education was provided via written and auditory forms  There were no barriers to learning  Written handouts regarding wound care, incision and scar care, and general preoperative information was provided to the patient  Prior to surgery, the patient may be requested to stop all anti-inflammatory medications  Prophylactic aspirin, Plavix, and Coumadin may be allowed to be continued  Medications including vitamin E , ginkgo, and fish oil are requested to be stopped approximately one week prior to surgery  Hypertensive medications and beta blockers, if taken, should be continued      Scribe Attestation    I,:   [place] Bray John Avalos PA-C am acting as a scribe while in the presence of the attending physician :        I,:   Josh Hart MD personally performed the services described in this documentation    as scribed in my presence :

## 2018-12-06 LAB
BACTERIA SPEC ANAEROBE CULT: NORMAL
BACTERIA SPEC ANAEROBE CULT: NORMAL
BACTERIA TISS AEROBE CULT: ABNORMAL
GRAM STN SPEC: ABNORMAL
GRAM STN SPEC: ABNORMAL

## 2018-12-07 DIAGNOSIS — L08.9 WOUND INFECTION: Primary | ICD-10-CM

## 2018-12-07 DIAGNOSIS — T14.8XXA WOUND INFECTION: Primary | ICD-10-CM

## 2018-12-07 LAB
BACTERIA TISS AEROBE CULT: ABNORMAL
GRAM STN SPEC: ABNORMAL

## 2018-12-07 RX ORDER — SULFAMETHOXAZOLE AND TRIMETHOPRIM 800; 160 MG/1; MG/1
1 TABLET ORAL EVERY 12 HOURS SCHEDULED
Qty: 14 TABLET | Refills: 0 | Status: SHIPPED | OUTPATIENT
Start: 2018-12-07 | End: 2018-12-14

## 2018-12-07 NOTE — PROGRESS NOTES
Wound cultures were reviewed with Dr Adrianna Salazar  We will place him on 7 days of Bactrim antibiotics, which was sent to his pharmacy on file  Please call him and let him know    Thank you     [id number], [address] [facility], [place], [place]

## 2018-12-10 ENCOUNTER — OFFICE VISIT (OUTPATIENT)
Dept: OBGYN CLINIC | Facility: MEDICAL CENTER | Age: 58
End: 2018-12-10

## 2018-12-10 VITALS
HEIGHT: 69 IN | WEIGHT: 191 LBS | BODY MASS INDEX: 28.29 KG/M2 | HEART RATE: 75 BPM | SYSTOLIC BLOOD PRESSURE: 148 MMHG | DIASTOLIC BLOOD PRESSURE: 88 MMHG

## 2018-12-10 DIAGNOSIS — Z48.89 AFTERCARE FOLLOWING SURGERY: Primary | ICD-10-CM

## 2018-12-10 PROCEDURE — 99024 POSTOP FOLLOW-UP VISIT: CPT | Performed by: ORTHOPAEDIC SURGERY

## 2018-12-10 RX ORDER — METOPROLOL SUCCINATE 200 MG/1
TABLET, EXTENDED RELEASE ORAL
Status: ON HOLD | COMMUNITY
Start: 2018-12-04 | End: 2019-01-16 | Stop reason: ALTCHOICE

## 2018-12-10 NOTE — PROGRESS NOTES
SUBJECTIVE:  Bhavna Ruiz is a 66y o  year old male who presents for follow up status post I&D washout of right thumb with wound closure for dehiscence on  12/4/2018  He has a history of multiple thumb CMC suspensionplasty failures with his latest resulting in wound dehiscence  He finished keflex  He was placed on Bactrim on 12/07/2018 for Staphylococcus  He has been compliant with his restrictions  He denies any fevers, chills or drainage  He has no new complaints or concerns today in the office  VITALS:  Vitals:    12/10/18 1528   BP: 148/88   Pulse: 75       PHYSICAL EXAMINATION:  General: well developed and well nourished, alert, oriented times 3 and appears comfortable  Psychiatric: Normal    MUSCULOSKELETAL EXAMINATION:  Right thumb:  Incision: Clean, dry, intact  Sutures well approximated  Range of Motion: Not tested  Neurovascular status: Neuro intact, good cap refill      STUDIES REVIEWED:  No studies reviewed  PROCEDURES PERFORMED:  Procedures  No Procedures performed today      ASSESSMENT/PLAN:    Statue post I&D washout on 12/04/2018  * Start physical therapy  Script was provided  * Sutures can come out 12/14/2018  * Follow up 6 weeks    FOLLOW UP:  Return in about 6 weeks (around 1/21/2019)        TO DO AT NEXT VISIT:  Re-evaluation of current issue      Scribe Attestation    I,:   Cammie Juárez am acting as a scribe while in the presence of the attending physician :        I,:   Huong Horn MD personally performed the services described in this documentation    as scribed in my presence :

## 2018-12-14 ENCOUNTER — OFFICE VISIT (OUTPATIENT)
Dept: OCCUPATIONAL THERAPY | Facility: CLINIC | Age: 58
End: 2018-12-14
Payer: MEDICARE

## 2018-12-14 DIAGNOSIS — M19.049 CMC ARTHRITIS: Primary | ICD-10-CM

## 2018-12-14 DIAGNOSIS — G56.01 CARPAL TUNNEL SYNDROME OF RIGHT WRIST: ICD-10-CM

## 2018-12-14 PROCEDURE — G8985 CARRY GOAL STATUS: HCPCS

## 2018-12-14 PROCEDURE — 97110 THERAPEUTIC EXERCISES: CPT

## 2018-12-14 PROCEDURE — G8984 CARRY CURRENT STATUS: HCPCS
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
## 2018-12-17 ENCOUNTER — OFFICE VISIT (OUTPATIENT)
Dept: OCCUPATIONAL THERAPY | Facility: CLINIC | Age: 58
End: 2018-12-17
Payer: MEDICARE

## 2018-12-17 DIAGNOSIS — G56.01 CARPAL TUNNEL SYNDROME OF RIGHT WRIST: ICD-10-CM

## 2018-12-17 DIAGNOSIS — M19.049 CMC ARTHRITIS: Primary | ICD-10-CM

## 2018-12-17 PROCEDURE — 97140 MANUAL THERAPY 1/> REGIONS: CPT

## 2018-12-17 PROCEDURE — 97035 APP MDLTY 1+ULTRASOUND EA 15: CPT

## 2018-12-17 PROCEDURE — 97110 THERAPEUTIC EXERCISES: CPT

## 2018-12-17 NOTE — PROGRESS NOTES
Daily Note     Today's date: 2018  Patient name: Dhruv Nava  : 1960  MRN: [id number]  Referring provider: Katiuska Mendoza MD  Dx:   Encounter Diagnosis     ICD-10-CM    1  CMC arthritis M19 049    2  Carpal tunnel syndrome of right wrist G56 01                   Subjective: " I haven't changed the bandage since I saw you last "      Objective: See treatment diary below  Precautions LRTI protocol   Sx 18     Specialty Daily Treatment Diary      Manual     Dressing change  Adaptic,2x2, cesar  2x2, cesar, coban   2x2, cesar, coban  2x2, cesar, coban  Adaptic, 2x2, cesar  Sutures removed   Ortho fit/train              Edema massage 10' 15'  20'        PROM thumb  5' gentle                               Exercise Diary  12/17 11/15  11/21  11/23  12/14   AROM digits 2-5 x10 x10  x10  x10  x10   AROM thumb IP joint x10 x10  x10  x10  x10   Dr Mallika Braun stretch  x10  x10  x10  x10     :             AROM thumb all planes  x10 x10  x10  x10     AROM wrist all planes  x10  x10 x10  x10      AAROM  thumb  x20  x20  x20  x20  x10   AAROM wrist  x20  x20  x20  x20   x10                                                                                                                                                                                 Modalities 11/15 11/21  11/23  12/17  11/13   Ultrasound 3 3 Mhz, 0 8 w/cm2, 20% pulsed 4' CT, 4' dorsum hand, but not CMC 4' CT, 4' dorsum hand, but not CMC 4' CT, 4' dorsum hand, but not CMC  8' [facility]   4' CT, 4' dorsum hand, but not [facility]    MHP before tx        5'      CP after tx          10'          Assessment: Tolerated treatment well  Patient exhibited good technique with therapeutic exercises and would benefit from continued OT  Patient able to active oppose thumb to tip of small finger  Some scab still noted over scar, but no drainage and no significant edema observed  Plan: Continue per plan of care    Progress treatment as tolerated

## 2018-12-26 ENCOUNTER — OFFICE VISIT (OUTPATIENT)
Dept: OCCUPATIONAL THERAPY | Facility: CLINIC | Age: 58
End: 2018-12-26
Payer: MEDICARE

## 2018-12-26 DIAGNOSIS — L03.119 CELLULITIS OF HAND: Primary | ICD-10-CM

## 2018-12-26 DIAGNOSIS — Z48.89 AFTERCARE FOLLOWING SURGERY: ICD-10-CM

## 2018-12-26 DIAGNOSIS — M19.049 CMC ARTHRITIS: Primary | ICD-10-CM

## 2018-12-26 DIAGNOSIS — G56.01 CARPAL TUNNEL SYNDROME OF RIGHT WRIST: ICD-10-CM

## 2018-12-26 RX ORDER — CEPHALEXIN 500 MG/1
500 CAPSULE ORAL EVERY 6 HOURS SCHEDULED
Qty: 40 CAPSULE | Refills: 0 | Status: SHIPPED | OUTPATIENT
Start: 2018-12-26 | End: 2019-01-05

## 2018-12-26 RX ORDER — IBUPROFEN 600 MG/1
600 TABLET ORAL EVERY 6 HOURS PRN
Qty: 30 TABLET | Refills: 1 | Status: SHIPPED | OUTPATIENT
Start: 2018-12-26 | End: 2019-03-21

## 2018-12-26 NOTE — PROGRESS NOTES
Daily Note     Today's date: 2018  Patient name: Marvene Litten  : 1960  MRN: [id number]  Referring provider: Emmanuel Alves MD  Dx:   Encounter Diagnosis     ICD-10-CM    1  CMC arthritis M19 049    2  Carpal tunnel syndrome of right wrist G56 01                   Subjective: Patient reports his hand began to swell and hurt again beginning on 18  He denies doing anything to hurt his hand, but stated the night of 18 he had severe nightmares and was thrashing around in his sleep  Patient came into therapy with dressing and wearing a sling  Objective: See treatment diary below  Precautions LRTI protocol   Sx 18     Specialty Daily Treatment Diary      Manual     Dressing change  Adaptic,2x2, cesar  2x2, cesar, coban   2x2, cesar, coban  2x2, cesar, coban  Adaptic, 2x2, cesar   Sutures removed   Ortho fit/train              Edema massage 10' 15'  20'        PROM thumb  5' gentle                               Exercise Diary  12/17 11/15  11/21  11/23  12/14   AROM digits 2-5 x10 x10  x10  x10  x10   AROM thumb IP joint x10 x10  x10  x10  x10   Dr Roxana Murrell stretch  x10  x10  x10  x10     :             AROM thumb all planes  x10 x10  x10  x10     AROM wrist all planes  x10  x10 x10  x10      AAROM  thumb  x20  x20  x20  x20  x10   AAROM wrist  x20  x20  x20  x20   x10                                                                                                                                                                                 Modalities 11/15 11/21  11/23  12/17  12/26   Ultrasound 3 3 Mhz, 0 8 w/cm2, 20% pulsed 4' CT, 4' dorsum hand, but not CMC 4' CT, 4' dorsum hand, but not CMC 4' CT, 4' dorsum hand, but not CMC  8' [facility] MHP before tx        5'      CP after tx          10'          Assessment: No treatment provided due to rednes, pain and edema at the [facility] joint of the right thumb  Wound is fully healed with no drainage  Skin is intact  Dr Smith Lies notified of the change in status    Patient made an appt for 12/27/18 at [phone] in [facility] office with Dr Jose Villatoro: resume tx when cleared for tx by MD

## 2018-12-26 NOTE — PROGRESS NOTES
Received a call from physical therapy stating that his thumb looked like there was a possible infection  Antibiotics was sent to his pharmacy

## 2018-12-27 ENCOUNTER — OFFICE VISIT (OUTPATIENT)
Dept: OBGYN CLINIC | Facility: CLINIC | Age: 58
End: 2018-12-27

## 2018-12-27 ENCOUNTER — APPOINTMENT (OUTPATIENT)
Dept: OCCUPATIONAL THERAPY | Facility: CLINIC | Age: 58
End: 2018-12-27
Payer: MEDICARE

## 2018-12-27 VITALS
WEIGHT: 191 LBS | SYSTOLIC BLOOD PRESSURE: 146 MMHG | HEART RATE: 73 BPM | BODY MASS INDEX: 28.29 KG/M2 | HEIGHT: 69 IN | DIASTOLIC BLOOD PRESSURE: 87 MMHG

## 2018-12-27 DIAGNOSIS — M19.049 CMC ARTHRITIS: ICD-10-CM

## 2018-12-27 DIAGNOSIS — L03.119 CELLULITIS OF HAND: ICD-10-CM

## 2018-12-27 DIAGNOSIS — M79.644 CHRONIC PAIN OF RIGHT THUMB: Primary | ICD-10-CM

## 2018-12-27 DIAGNOSIS — G89.29 CHRONIC PAIN OF RIGHT THUMB: Primary | ICD-10-CM

## 2018-12-27 PROCEDURE — 99024 POSTOP FOLLOW-UP VISIT: CPT | Performed by: ORTHOPAEDIC SURGERY

## 2018-12-27 RX ORDER — NEBIVOLOL HYDROCHLORIDE 10 MG/1
10 TABLET ORAL
Status: ON HOLD | COMMUNITY
Start: 2018-12-10 | End: 2019-01-16 | Stop reason: ALTCHOICE

## 2018-12-27 RX ORDER — GABAPENTIN 300 MG/1
300 CAPSULE ORAL
Qty: 30 CAPSULE | Refills: 0 | Status: SHIPPED | OUTPATIENT
Start: 2018-12-27 | End: 2019-01-15

## 2018-12-27 NOTE — PROGRESS NOTES
SUBJECTIVE:  Willie Sun is a 66y o  year old male who presents for follow up after surgery for right thumb I&D washout and wound closure due to wound dehiscence  done on  12/4/2018  He states that he did not have any trauma to the right hand, but was carrying gifts  A few days ago he noticed increased pain and swelling about the right thumb  He was seen at therapy and was told to call our office  A prescription for Keflex was called into his pharmacy and he has been taking it since yesterday  He states that the burning sensation and the swelling has improved  VITALS:  Vitals:    12/27/18 1542   BP: 146/87   Pulse: 73       PHYSICAL EXAMINATION:  General: well developed and well nourished, alert, oriented times 3 and appears comfortable  Psychiatric: Normal    MUSCULOSKELETAL EXAMINATION:  Right thumb  Incision: Mildly red, but hyperemic  Incision healed  No warmth  No discharge  Range of Motion: Good flexion and extension without pain  Neurovascular status: Neuro intact, good cap refill      STUDIES REVIEWED:  No studies reviewed  PROCEDURES PERFORMED:  Procedures  No Procedures performed today      ASSESSMENT/PLAN:    Mild irritation around incision site  * Continue Keflex  Start Gabapentin to help with sleep due to pain in the right thumb  * Keep scheduled appt in 4 weeks  If there is any issue before that appointment, call our office  FOLLOW UP:  Return for keep scheduled appointment        TO DO AT NEXT VISIT:  Re-evaluation of current issue      Scribe Attestation    I,:   Sidney Toro PA-C am acting as a scribe while in the presence of the attending physician :        I,:   Lee Ballard MD personally performed the services described in this documentation    as scribed in my presence :

## 2019-01-03 ENCOUNTER — OFFICE VISIT (OUTPATIENT)
Dept: OCCUPATIONAL THERAPY | Facility: CLINIC | Age: 59
End: 2019-01-03
Payer: MEDICARE

## 2019-01-03 DIAGNOSIS — G56.01 CARPAL TUNNEL SYNDROME OF RIGHT WRIST: ICD-10-CM

## 2019-01-03 DIAGNOSIS — M19.049 CMC ARTHRITIS: Primary | ICD-10-CM

## 2019-01-03 PROCEDURE — 97035 APP MDLTY 1+ULTRASOUND EA 15: CPT

## 2019-01-03 NOTE — PROGRESS NOTES
Daily Note     Today's date: 1/3/2019  Patient name: Arely Crystal  : 1960  MRN: [id number]  Referring provider: Leonard Sparks MD  Dx:   Encounter Diagnosis     ICD-10-CM    1  CMC arthritis M19 049    2  Carpal tunnel syndrome of right wrist G56 01                   Subjective: Patient reports he has been wearing thumb splint, icing, and doing light exercises at home  He is now on antibiotics      Objective: See treatment diary below    Precautions LRTI protocol   Sx 18     Specialty Daily Treatment Diary      Manual      Dressing change  Adaptic,2x2, cesar  2x2, cesar, coban   2x2, cesar, coban  2x2, cesar, coban     Ortho fit/train              Edema massage 10' 15'  20'        PROM thumb  5' gentle                               Exercise Diary  12/17 11/15  11/21  11/23  1/3/19   AROM digits 2-5 x10 x10  x10  x10  x10   AROM thumb IP joint x10 x10  x10  x10  x10   Dr Glenda Rockwell stretch  x10  x10  x10  x10     :             AROM thumb all planes  x10 x10  x10  x10     AROM wrist all planes  x10  x10 x10  x10      AAROM  thumb  x20  x20  x20  x20  x10   AAROM wrist  x20  x20  x20  x20   x10                                                                                                                                                                                 Modalities 1/3/19 11/21  11/23  12/17  12/26   Ultrasound 3 3 Mhz, 0 8 w/cm2, 20% pulsed 4' CT, 4' dorsum hand, but not CMC  0 4w/cm2 this date 4' CT, 4' dorsum hand, but not CMC 4' CT, 4' dorsum hand, but not CMC  8' [facility] MHP before tx        5'      CP after tx  10'        10'           Assessment: Tolerated treatment fair  Patient would benefit from continued OT  Patient 40 minutes late for tx, so he only received US, AROM and ice pack  DC MHP due to ongoing infection and lowered intensity of US  No drainage and mild edema at scar      Plan: Continue per plan of care

## 2019-01-09 DIAGNOSIS — Z48.89 AFTERCARE FOLLOWING SURGERY: ICD-10-CM

## 2019-01-10 ENCOUNTER — OFFICE VISIT (OUTPATIENT)
Dept: OCCUPATIONAL THERAPY | Facility: CLINIC | Age: 59
End: 2019-01-10
Payer: MEDICARE

## 2019-01-10 DIAGNOSIS — M19.049 CMC ARTHRITIS: Primary | ICD-10-CM

## 2019-01-10 DIAGNOSIS — G56.01 CARPAL TUNNEL SYNDROME OF RIGHT WRIST: ICD-10-CM

## 2019-01-10 PROCEDURE — 97110 THERAPEUTIC EXERCISES: CPT

## 2019-01-10 RX ORDER — IBUPROFEN 600 MG/1
TABLET ORAL
Qty: 30 TABLET | Refills: 1 | OUTPATIENT
Start: 2019-01-10
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
## 2019-01-10 NOTE — TELEPHONE ENCOUNTER
Please call Sherra Barthel  Any further NSAIDS should come from his PCP so they can monitor his kidney function  Please let  Him know  Thank you

## 2019-01-10 NOTE — TELEPHONE ENCOUNTER
sw pt he does not need a refill bc he is having a shoulder replacement and he is giving him the medication

## 2019-01-10 NOTE — TELEPHONE ENCOUNTER
Patient is looking for a refill on motrin  I prescribed this once for him  Not sure if you would like him to continue this

## 2019-01-14 ENCOUNTER — OFFICE VISIT (OUTPATIENT)
Dept: OCCUPATIONAL THERAPY | Facility: CLINIC | Age: 59
End: 2019-01-14
Payer: MEDICARE

## 2019-01-14 DIAGNOSIS — M19.049 CMC ARTHRITIS: Primary | ICD-10-CM

## 2019-01-14 DIAGNOSIS — G56.01 CARPAL TUNNEL SYNDROME OF RIGHT WRIST: ICD-10-CM

## 2019-01-14 NOTE — PROGRESS NOTES
Daily Note     Today's date: 2019  Patient name: Izzy Parkinson  : 1960  MRN: [id number]  Referring provider: Dilia Jack MD  Dx:   Encounter Diagnosis     ICD-10-CM    1  CMC arthritis M19 049    2  Carpal tunnel syndrome of right wrist G56 01                   Subjective: I woke up yesterday and my thumb was swollen again  I ran out of the Keflex 3 days ago  Objective: See treatment diary below    Precautions LRTI protocol   Sx 18     Specialty Daily Treatment Diary      Manual     Dressing change  Adaptic,2x2, cesar  2x2, cesar, coban   2x2, cesar, coban  2x2, cesar, coban  3 5" tubigrip   Ortho fit/train              Edema massage 10' 15'  20'        PROM thumb  5' gentle                               Exercise Diary  12/17 11/15  11/21  11/23  1/3/19   AROM digits 2-5 x10 x10  x10  x10  x10   AROM thumb IP joint x10 x10  x10  x10  x10   Dr Chi Montgomery stretch  x10  x10  x10  x10     :             AROM thumb all planes  x10 x10  x10  x10     AROM wrist all planes  x10  x10 x10  x10      AAROM  thumb  x20  x20  x20  x20  x10   AAROM wrist  x20  x20  x20  x20   x10                                                                                                                                                                                 Modalities 1/3/19 1/10  11/23  12/17  12/26   Ultrasound 3 3 Mhz, 0 8 w/cm2, 20% pulsed 4' CT, 4' dorsum hand, but not CMC  0 4w/cm2 this date 2' CT, 2' dorsum hand  0 4w/cm2 4' CT, 4' dorsum hand, but not CMC  8' [facility] MHP before tx        5'      CP after tx  10'        10'        Assessment: Tolerated treatment No treatment provided due to apparent infection  Patient on hold until MD allows him to return to therapy   Therapist contacted MD regarding redness and swelling in hand    Plan: Therapy on hold due to possible infection    Patient has appt with Dr Nick Arriaza 1/15/19

## 2019-01-15 ENCOUNTER — OFFICE VISIT (OUTPATIENT)
Dept: OBGYN CLINIC | Facility: CLINIC | Age: 59
End: 2019-01-15

## 2019-01-15 ENCOUNTER — ANESTHESIA EVENT (OUTPATIENT)
Dept: PERIOP | Facility: HOSPITAL | Age: 59
DRG: 989 | End: 2019-01-15
Payer: MEDICARE

## 2019-01-15 VITALS
SYSTOLIC BLOOD PRESSURE: 128 MMHG | BODY MASS INDEX: 28.63 KG/M2 | WEIGHT: 200 LBS | HEART RATE: 72 BPM | DIASTOLIC BLOOD PRESSURE: 81 MMHG | HEIGHT: 70 IN

## 2019-01-15 DIAGNOSIS — L08.9 INFECTION OF HAND: Primary | ICD-10-CM

## 2019-01-15 PROCEDURE — 87147 CULTURE TYPE IMMUNOLOGIC: CPT | Performed by: PHYSICIAN ASSISTANT

## 2019-01-15 PROCEDURE — 99024 POSTOP FOLLOW-UP VISIT: CPT | Performed by: ORTHOPAEDIC SURGERY

## 2019-01-15 PROCEDURE — 87070 CULTURE OTHR SPECIMN AEROBIC: CPT | Performed by: PHYSICIAN ASSISTANT

## 2019-01-15 PROCEDURE — 87205 SMEAR GRAM STAIN: CPT | Performed by: PHYSICIAN ASSISTANT

## 2019-01-15 PROCEDURE — 87186 SC STD MICRODIL/AGAR DIL: CPT | Performed by: PHYSICIAN ASSISTANT

## 2019-01-15 PROCEDURE — 0H9FXZX DRAINAGE OF RIGHT HAND SKIN, EXTERNAL APPROACH, DIAGNOSTIC: ICD-10-PCS | Performed by: ORTHOPAEDIC SURGERY

## 2019-01-15 NOTE — PRE-PROCEDURE INSTRUCTIONS
Pre-Surgery Instructions:   Medication Instructions    amLODIPine (NORVASC) 10 mg tablet Patient was instructed by Physician and understands   BYSTOLIC 10 MG tablet Patient was instructed by Physician and understands   clonazePAM (KlonoPIN) 1 mg tablet Patient was instructed by Physician and understands   lansoprazole (PREVACID) 30 mg capsule Patient was instructed by Physician and understands   metoprolol succinate (TOPROL-XL) 100 mg 24 hr tablet Patient was instructed by Physician and understands   valsartan-hydrochlorothiazide (DIOVAN-HCT) 160-12 5 MG per tablet Patient was instructed by Physician and understands

## 2019-01-15 NOTE — H&P
CHIEF COMPLAINT:  Chief Complaint   Patient presents with    Right Hand - Swelling, Pain       SUBJECTIVE:  Jennifer Mclaughlin is a 66y o  year old male who presents to the office for follow-up after surgery for right thumb I and D washout and wound closure due to wound dehiscence performed 12/4/18  Patient previously had a removal of hardware right wrist  performed 10/17/2018  Patient also had right CMC suspension plasty and right carpal tunnel release performed 08/20/2018  Pt was seen at therapy yesterdsy where he presented with swelling in his right thumb  Pt finished the prescribed Keflex 4 days ago  The therapist called our office and we advised for the patient to be NPO and f/u in the office today  He states he did eat breakfast and has some obligations with his car and can not have surgery today  Denies any fever or chills          PAST MEDICAL HISTORY:  Past Medical History:   Diagnosis Date    Anxiety     Chronic pain disorder     Depression     Fractures     Head injury     Hyperlipidemia     Hypertension        PAST SURGICAL HISTORY:  Past Surgical History:   Procedure Laterality Date    APPENDECTOMY      HAND SURGERY      KNEE SURGERY      DC DRAIN SKIN ABSCESS COMPLIC Right 33/1/2074    Procedure: THUMB [facility] JOINT INCISION AND DRAINAGE;  Surgeon: Boaz Romero MD;  [facility];  Service: Orthopedics    DC REMOVAL DEEP IMPLANT Right 10/17/2018    Procedure: WRIST REMOVAL OF HARDWARE;  Surgeon: Boaz Romero MD;  [facility];  Service: Orthopedics    DC REPAIR INTERCARP/CARP-METACARP JT Right 8/28/2018    Procedure: Kavon Jones SUSPENSIONPLASTY;  Surgeon: Boaz Romero MD;  [facility];  Service: Orthopedics    DC WRIST [place] LIG Right 8/28/2018    Procedure: ENDOSCOPIC CARPAL TUNNEL RELEASE;  Surgeon: Boaz Romero MD;  [facility];  Service: Orthopedics    WRIST SURGERY         FAMILY HISTORY:  Family History   Problem Relation Age of Onset    Stroke Brother     Heart attack Brother     Colon cancer Mother     Heart disease Father     Lung cancer Father        SOCIAL HISTORY:  Social History   Substance Use Topics    Smoking status: Never Smoker    Smokeless tobacco: Never Used    Alcohol use No      Comment: occasionally       MEDICATIONS:    Current Outpatient Prescriptions:     amLODIPine (NORVASC) 10 mg tablet, Take 10 mg by mouth daily, Disp: , Rfl:     aspirin (ECOTRIN LOW STRENGTH) 81 mg EC tablet, Take 81 mg by mouth daily, Disp: , Rfl:     BYSTOLIC 10 MG tablet, , Disp: , Rfl:     BYSTOLIC 20 MG tablet, 20 mg  , Disp: , Rfl:     clonazePAM (KlonoPIN) 1 mg tablet, 1 mg  , Disp: , Rfl:     ibuprofen (MOTRIN) 600 mg tablet, Take 1 tablet (600 mg total) by mouth every 6 (six) hours as needed (for inflammation, pain), Disp: 30 tablet, Rfl: 1    lansoprazole (PREVACID) 30 mg capsule, lansoprazole 30 mg capsule,delayed release, Disp: , Rfl:     metoprolol succinate (TOPROL-XL) 100 mg 24 hr tablet, metoprolol succinate  mg tablet,extended release 24 hr, Disp: , Rfl:     metoprolol succinate (TOPROL-XL) 200 MG 24 hr tablet, , Disp: , Rfl:     oxyCODONE-acetaminophen (PERCOCET)  mg per tablet, oxycodone-acetaminophen 10 mg-325 mg tablet, Disp: , Rfl:     QUEtiapine (SEROquel) 300 mg tablet, quetiapine 300 mg tablet, Disp: , Rfl:     simvastatin (ZOCOR) 40 mg tablet, simvastatin 40 mg tablet, Disp: , Rfl:     valsartan (DIOVAN) 160 mg tablet, 160 mg  , Disp: , Rfl:     valsartan-hydrochlorothiazide (DIOVAN-HCT) 160-12 5 MG per tablet, , Disp: , Rfl:     zolpidem (AMBIEN CR) 12 5 MG CR tablet, zolpidem ER 12 5 mg tablet,extended release,multiphase, Disp: , Rfl:     ALLERGIES:  No Known Allergies    REVIEW OF SYSTEMS:  Review of Systems   Constitutional: Negative for chills, fever and unexpected weight change  HENT: Negative for hearing loss, nosebleeds and sore throat      Eyes: Negative for pain, redness and visual disturbance  Respiratory: Negative for cough, shortness of breath and wheezing  Cardiovascular: Negative for chest pain, palpitations and leg swelling  Gastrointestinal: Negative for abdominal pain, nausea and vomiting  Endocrine: Negative for polydipsia and polyuria  Genitourinary: Negative for dysuria and hematuria  Musculoskeletal: Negative for arthralgias, joint swelling and myalgias  Skin: Negative for rash and wound  Neurological: Negative for light-headedness, numbness and headaches  Psychiatric/Behavioral: Negative for decreased concentration, dysphoric mood and suicidal ideas  The patient is not nervous/anxious  VITALS:  Vitals:    01/15/19 1102   BP: 128/81   Pulse: 72       LABS:  HgA1c: No results found for: HGBA1C  BMP:   Lab Results   Component Value Date    CALCIUM 8 9 10/13/2018    K 4 5 10/13/2018    CO2 26 10/13/2018    CL 94 (L) 10/13/2018    BUN 20 10/13/2018    CREATININE 0 92 10/13/2018       _____________________________________________________  PHYSICAL EXAMINATION:  General: well developed and well nourished, alert, oriented times 3 and appears comfortable  Psychiatric: Normal  HEENT: Trachea Midline, No torticollis  Cardiac:  Regular rate and rhythm  Pulmonary: No respiratory distress  Lung sounds clear to auscultation  Skin: See musculoskeletal exam below  Positive fluctuation   Neurovascular: Sensation Intact to the Median, Ulnar, Radial Nerve, Motor Intact to the Median, Ulnar, Radial Nerve and Pulses Intact    MUSCULOSKELETAL EXAMINATION:  Right hand over 1st metacarpal :Positive fluctuation around the incision for the previous [facility] arthroplasty  Normal skin temperature  Concerns for infection  We will use a needle to see if there is any fluid       ___________________________________________________  STUDIES REVIEWED:  No studies reviewed         PROCEDURES PERFORMED:  Procedures  Right hand over 1st metacarpal: The area was prepped and an 18 gauge needed was used to express a moderate amount of purulent drainage was expressed and a culture was obtained  The culture will be sent STAT  A compressive dressing was placed       _____________________________________________________  ASSESSMENT/PLAN:    Right hand infection s/p surgical procedures  * No oral antibiotics needed at this time  We will give IV antibiotics AFTER intraoperative cultures tomorrow  * Patient will need to be NPO tonight and he voiced understanding  * The patient has purulent drainage and will need a formal I&D in surgery where we can wash this out fully  * Culture obtained today of the purulent drainage and sent STAT  * Surgery: I&D right hand   * Consent obtained  * Patient ate today, so we will schedule surgery tomorrow  * Anesthesia: IV sedation with local  * We will obtain intraoperative cultures, and then the patient will be given IV ancef  * F/u 1 week with my PA Claudell Loyal  * No hand therapy needed at this time  Diagnoses and all orders for this visit:    Infection of hand  -     Case request operating room: INCISION AND DRAINAGE (I&D) EXTREMITY: Right hand; Standing  -     Case request operating room: INCISION AND DRAINAGE (I&D) EXTREMITY: Right hand  -     Wound culture and Gram stain; Future    Other orders  -     Diet NPO; Sips with meds; Standing  -     Height and weight upon arrival; Standing  -     Void on call to OR; Standing  -     Insert peripheral IV; Standing  -     ceFAZolin (ANCEF) 1,000 mg in dextrose 5 % 100 mL IVPB; Infuse 1,000 mg into a venous catheter once         Follow Up:  Return for 7 days after surgery with PA Claudell Loyal        To Do Next Visit:  Re-evaluation of current issue and Sutures out      Scribe Attestation    I,:   Jorge Abbott PA-C am acting as a scribe while in the presence of the attending physician :        I,:   Caio Lee MD personally performed the services described in this documentation    as scribed in my presence :

## 2019-01-15 NOTE — PROGRESS NOTES
CHIEF COMPLAINT:  Chief Complaint   Patient presents with    Right Hand - Swelling, Pain       SUBJECTIVE:  Jennifer Mclaughlin is a 66y o  year old male who presents to the office for follow-up after surgery for right thumb I and D washout and wound closure due to wound dehiscence performed 12/4/18  Patient previously had a removal of hardware right wrist  performed 10/17/2018  Patient also had right CMC suspension plasty and right carpal tunnel release performed 08/20/2018  Pt was seen at therapy yesterdsy where he presented with swelling in his right thumb  Pt finished the prescribed Keflex 4 days ago  The therapist called our office and we advised for the patient to be NPO and f/u in the office today  He states he did eat breakfast and has some obligations with his car and can not have surgery today  Denies any fever or chills          PAST MEDICAL HISTORY:  Past Medical History:   Diagnosis Date    Anxiety     Chronic pain disorder     Depression     Fractures     Head injury     Hyperlipidemia     Hypertension        PAST SURGICAL HISTORY:  Past Surgical History:   Procedure Laterality Date    APPENDECTOMY      HAND SURGERY      KNEE SURGERY      ME DRAIN SKIN ABSCESS COMPLIC Right 99/2/3454    Procedure: THUMB [facility] JOINT INCISION AND DRAINAGE;  Surgeon: Boaz Romero MD;  [facility];  Service: Orthopedics    ME REMOVAL DEEP IMPLANT Right 10/17/2018    Procedure: WRIST REMOVAL OF HARDWARE;  Surgeon: Boaz Romero MD;  [facility];  Service: Orthopedics    ME REPAIR INTERCARP/CARP-METACARP JT Right 8/28/2018    Procedure: Kavon Jones SUSPENSIONPLASTY;  Surgeon: Boaz Romero MD;  [facility];  Service: Orthopedics    ME WRIST [place] LIG Right 8/28/2018    Procedure: ENDOSCOPIC CARPAL TUNNEL RELEASE;  Surgeon: Boaz Romero MD;  [facility];  Service: Orthopedics    WRIST SURGERY         FAMILY HISTORY:  Family History   Problem Relation Age of Onset    Stroke Brother     Heart attack Brother     Colon cancer Mother     Heart disease Father     Lung cancer Father        SOCIAL HISTORY:  Social History   Substance Use Topics    Smoking status: Never Smoker    Smokeless tobacco: Never Used    Alcohol use No      Comment: occasionally       MEDICATIONS:    Current Outpatient Prescriptions:     amLODIPine (NORVASC) 10 mg tablet, Take 10 mg by mouth daily, Disp: , Rfl:     aspirin (ECOTRIN LOW STRENGTH) 81 mg EC tablet, Take 81 mg by mouth daily, Disp: , Rfl:     BYSTOLIC 10 MG tablet, , Disp: , Rfl:     BYSTOLIC 20 MG tablet, 20 mg  , Disp: , Rfl:     clonazePAM (KlonoPIN) 1 mg tablet, 1 mg  , Disp: , Rfl:     ibuprofen (MOTRIN) 600 mg tablet, Take 1 tablet (600 mg total) by mouth every 6 (six) hours as needed (for inflammation, pain), Disp: 30 tablet, Rfl: 1    lansoprazole (PREVACID) 30 mg capsule, lansoprazole 30 mg capsule,delayed release, Disp: , Rfl:     metoprolol succinate (TOPROL-XL) 100 mg 24 hr tablet, metoprolol succinate  mg tablet,extended release 24 hr, Disp: , Rfl:     metoprolol succinate (TOPROL-XL) 200 MG 24 hr tablet, , Disp: , Rfl:     oxyCODONE-acetaminophen (PERCOCET)  mg per tablet, oxycodone-acetaminophen 10 mg-325 mg tablet, Disp: , Rfl:     QUEtiapine (SEROquel) 300 mg tablet, quetiapine 300 mg tablet, Disp: , Rfl:     simvastatin (ZOCOR) 40 mg tablet, simvastatin 40 mg tablet, Disp: , Rfl:     valsartan (DIOVAN) 160 mg tablet, 160 mg  , Disp: , Rfl:     valsartan-hydrochlorothiazide (DIOVAN-HCT) 160-12 5 MG per tablet, , Disp: , Rfl:     zolpidem (AMBIEN CR) 12 5 MG CR tablet, zolpidem ER 12 5 mg tablet,extended release,multiphase, Disp: , Rfl:     ALLERGIES:  No Known Allergies    REVIEW OF SYSTEMS:  Review of Systems   Constitutional: Negative for chills, fever and unexpected weight change  HENT: Negative for hearing loss, nosebleeds and sore throat      Eyes: Negative for pain, redness and visual disturbance  Respiratory: Negative for cough, shortness of breath and wheezing  Cardiovascular: Negative for chest pain, palpitations and leg swelling  Gastrointestinal: Negative for abdominal pain, nausea and vomiting  Endocrine: Negative for polydipsia and polyuria  Genitourinary: Negative for dysuria and hematuria  Musculoskeletal: Negative for arthralgias, joint swelling and myalgias  Skin: Negative for rash and wound  Neurological: Negative for light-headedness, numbness and headaches  Psychiatric/Behavioral: Negative for decreased concentration, dysphoric mood and suicidal ideas  The patient is not nervous/anxious  VITALS:  Vitals:    01/15/19 1102   BP: 128/81   Pulse: 72       LABS:  HgA1c: No results found for: HGBA1C  BMP:   Lab Results   Component Value Date    CALCIUM 8 9 10/13/2018    K 4 5 10/13/2018    CO2 26 10/13/2018    CL 94 (L) 10/13/2018    BUN 20 10/13/2018    CREATININE 0 92 10/13/2018       _____________________________________________________  PHYSICAL EXAMINATION:  General: well developed and well nourished, alert, oriented times 3 and appears comfortable  Psychiatric: Normal  HEENT: Trachea Midline, No torticollis  Cardiac:  Regular rate and rhythm  Pulmonary: No respiratory distress  Lung sounds clear to auscultation  Skin: See musculoskeletal exam below  Positive fluctuation   Neurovascular: Sensation Intact to the Median, Ulnar, Radial Nerve, Motor Intact to the Median, Ulnar, Radial Nerve and Pulses Intact    MUSCULOSKELETAL EXAMINATION:  Right hand over 1st metacarpal :Positive fluctuation around the incision for the previous [facility] arthroplasty  Normal skin temperature  Concerns for infection  We will use a needle to see if there is any fluid       ___________________________________________________  STUDIES REVIEWED:  No studies reviewed         PROCEDURES PERFORMED:  Procedures  Right hand over 1st metacarpal: The area was prepped and an 18 gauge needed was used to express a moderate amount of purulent drainage was expressed and a culture was obtained  The culture will be sent STAT  A compressive dressing was placed       _____________________________________________________  ASSESSMENT/PLAN:    Right hand infection s/p surgical procedures  * No oral antibiotics needed at this time  We will give IV antibiotics AFTER intraoperative cultures tomorrow  * Patient will need to be NPO tonight and he voiced understanding  * The patient has purulent drainage and will need a formal I&D in surgery where we can wash this out fully  * Culture obtained today of the purulent drainage and sent STAT  * Surgery: I&D right hand   * Consent obtained  * Patient ate today, so we will schedule surgery tomorrow  * Anesthesia: IV sedation with local  * We will obtain intraoperative cultures, and then the patient will be given IV ancef  * F/u 1 week with my NANCY Manrique  * No hand therapy needed at this time  Diagnoses and all orders for this visit:    Infection of hand  -     Case request operating room: INCISION AND DRAINAGE (I&D) EXTREMITY: Right hand; Standing  -     Case request operating room: INCISION AND DRAINAGE (I&D) EXTREMITY: Right hand  -     Wound culture and Gram stain; Future    Other orders  -     Diet NPO; Sips with meds; Standing  -     Height and weight upon arrival; Standing  -     Void on call to OR; Standing  -     Insert peripheral IV; Standing  -     ceFAZolin (ANCEF) 1,000 mg in dextrose 5 % 100 mL IVPB; Infuse 1,000 mg into a venous catheter once         Follow Up:  Return for 7 days after surgery with NANCY Manrique        To Do Next Visit:  Re-evaluation of current issue and Sutures out      Scribe Attestation    I,:   Belle Weston PA-C am acting as a scribe while in the presence of the attending physician :        I,:   Bridgette Almonte MD personally performed the services described in this documentation    as scribed in my presence :

## 2019-01-16 ENCOUNTER — HOSPITAL ENCOUNTER (INPATIENT)
Facility: HOSPITAL | Age: 59
LOS: 1 days | Discharge: LEFT BEFORE DISCHARGE | DRG: 989 | End: 2019-01-16
Attending: ORTHOPAEDIC SURGERY | Admitting: ORTHOPAEDIC SURGERY
Payer: MEDICARE

## 2019-01-16 ENCOUNTER — ANESTHESIA (OUTPATIENT)
Dept: PERIOP | Facility: HOSPITAL | Age: 59
DRG: 989 | End: 2019-01-16
Payer: MEDICARE

## 2019-01-16 VITALS
OXYGEN SATURATION: 98 % | BODY MASS INDEX: 29.78 KG/M2 | HEART RATE: 60 BPM | TEMPERATURE: 97.6 F | RESPIRATION RATE: 18 BRPM | HEIGHT: 69 IN | SYSTOLIC BLOOD PRESSURE: 101 MMHG | WEIGHT: 201.06 LBS | DIASTOLIC BLOOD PRESSURE: 60 MMHG

## 2019-01-16 DIAGNOSIS — L08.9 INFECTION OF HAND: Primary | ICD-10-CM

## 2019-01-16 LAB
ANION GAP SERPL CALCULATED.3IONS-SCNC: 7 MMOL/L (ref 4–13)
BASOPHILS # BLD AUTO: 0.03 THOUSANDS/ΜL (ref 0–0.1)
BASOPHILS NFR BLD AUTO: 0 % (ref 0–1)
BUN SERPL-MCNC: 9 MG/DL (ref 5–25)
CALCIUM SERPL-MCNC: 9.1 MG/DL (ref 8.3–10.1)
CHLORIDE SERPL-SCNC: 94 MMOL/L (ref 100–108)
CO2 SERPL-SCNC: 28 MMOL/L (ref 21–32)
CREAT SERPL-MCNC: 0.74 MG/DL (ref 0.6–1.3)
EOSINOPHIL # BLD AUTO: 0.12 THOUSAND/ΜL (ref 0–0.61)
EOSINOPHIL NFR BLD AUTO: 1 % (ref 0–6)
ERYTHROCYTE [DISTWIDTH] IN BLOOD BY AUTOMATED COUNT: 13.3 % (ref 11.6–15.1)
GFR SERPL CREATININE-BSD FRML MDRD: 103 ML/MIN/1.73SQ M
GLUCOSE SERPL-MCNC: 97 MG/DL (ref 65–140)
GLUCOSE SERPL-MCNC: 98 MG/DL (ref 65–140)
HCT VFR BLD AUTO: 30.6 % (ref 36.5–49.3)
HGB BLD-MCNC: 10.5 G/DL (ref 12–17)
IMM GRANULOCYTES # BLD AUTO: 0.03 THOUSAND/UL (ref 0–0.2)
IMM GRANULOCYTES NFR BLD AUTO: 0 % (ref 0–2)
LYMPHOCYTES # BLD AUTO: 2.25 THOUSANDS/ΜL (ref 0.6–4.47)
LYMPHOCYTES NFR BLD AUTO: 26 % (ref 14–44)
MCH RBC QN AUTO: 29.2 PG (ref 26.8–34.3)
MCHC RBC AUTO-ENTMCNC: 34.3 G/DL (ref 31.4–37.4)
MCV RBC AUTO: 85 FL (ref 82–98)
MONOCYTES # BLD AUTO: 0.58 THOUSAND/ΜL (ref 0.17–1.22)
MONOCYTES NFR BLD AUTO: 7 % (ref 4–12)
NEUTROPHILS # BLD AUTO: 5.66 THOUSANDS/ΜL (ref 1.85–7.62)
NEUTS SEG NFR BLD AUTO: 66 % (ref 43–75)
NRBC BLD AUTO-RTO: 0 /100 WBCS
PLATELET # BLD AUTO: 250 THOUSANDS/UL (ref 149–390)
PMV BLD AUTO: 8.5 FL (ref 8.9–12.7)
POTASSIUM SERPL-SCNC: 4.7 MMOL/L (ref 3.5–5.3)
RBC # BLD AUTO: 3.6 MILLION/UL (ref 3.88–5.62)
SODIUM SERPL-SCNC: 129 MMOL/L (ref 136–145)
WBC # BLD AUTO: 8.67 THOUSAND/UL (ref 4.31–10.16)

## 2019-01-16 PROCEDURE — 0H9FX0Z DRAINAGE OF RIGHT HAND SKIN WITH DRAINAGE DEVICE, EXTERNAL APPROACH: ICD-10-PCS | Performed by: ORTHOPAEDIC SURGERY

## 2019-01-16 PROCEDURE — 87147 CULTURE TYPE IMMUNOLOGIC: CPT | Performed by: ORTHOPAEDIC SURGERY

## 2019-01-16 PROCEDURE — 82948 REAGENT STRIP/BLOOD GLUCOSE: CPT

## 2019-01-16 PROCEDURE — 87205 SMEAR GRAM STAIN: CPT | Performed by: ORTHOPAEDIC SURGERY

## 2019-01-16 PROCEDURE — 87176 TISSUE HOMOGENIZATION CULTR: CPT | Performed by: ORTHOPAEDIC SURGERY

## 2019-01-16 PROCEDURE — 85025 COMPLETE CBC W/AUTO DIFF WBC: CPT | Performed by: INTERNAL MEDICINE

## 2019-01-16 PROCEDURE — 87075 CULTR BACTERIA EXCEPT BLOOD: CPT | Performed by: ORTHOPAEDIC SURGERY

## 2019-01-16 PROCEDURE — 87070 CULTURE OTHR SPECIMN AEROBIC: CPT | Performed by: ORTHOPAEDIC SURGERY

## 2019-01-16 PROCEDURE — 26070 EXPLORE/TREAT HAND JOINT: CPT | Performed by: ORTHOPAEDIC SURGERY

## 2019-01-16 PROCEDURE — 87186 SC STD MICRODIL/AGAR DIL: CPT | Performed by: ORTHOPAEDIC SURGERY

## 2019-01-16 PROCEDURE — 80048 BASIC METABOLIC PNL TOTAL CA: CPT | Performed by: INTERNAL MEDICINE

## 2019-01-16 PROCEDURE — 0RBU0ZZ EXCISION OF RIGHT METACARPOPHALANGEAL JOINT, OPEN APPROACH: ICD-10-PCS | Performed by: ORTHOPAEDIC SURGERY

## 2019-01-16 RX ORDER — HYDROMORPHONE HCL/PF 1 MG/ML
0.2 SYRINGE (ML) INJECTION
Status: DISCONTINUED | OUTPATIENT
Start: 2019-01-16 | End: 2019-01-16

## 2019-01-16 RX ORDER — HYDROMORPHONE HCL/PF 1 MG/ML
SYRINGE (ML) INJECTION AS NEEDED
Status: DISCONTINUED | OUTPATIENT
Start: 2019-01-16 | End: 2019-01-16 | Stop reason: SURG

## 2019-01-16 RX ORDER — PRAVASTATIN SODIUM 40 MG
40 TABLET ORAL
Status: DISCONTINUED | OUTPATIENT
Start: 2019-01-16 | End: 2019-01-17 | Stop reason: HOSPADM

## 2019-01-16 RX ORDER — HYDROCHLOROTHIAZIDE 12.5 MG/1
12.5 TABLET ORAL DAILY
Status: DISCONTINUED | OUTPATIENT
Start: 2019-01-17 | End: 2019-01-17 | Stop reason: HOSPADM

## 2019-01-16 RX ORDER — OXYCODONE HYDROCHLORIDE AND ACETAMINOPHEN 5; 325 MG/1; MG/1
1 TABLET ORAL EVERY 4 HOURS PRN
Status: DISCONTINUED | OUTPATIENT
Start: 2019-01-16 | End: 2019-01-17 | Stop reason: HOSPADM

## 2019-01-16 RX ORDER — PROPOFOL 10 MG/ML
INJECTION, EMULSION INTRAVENOUS CONTINUOUS PRN
Status: DISCONTINUED | OUTPATIENT
Start: 2019-01-16 | End: 2019-01-16 | Stop reason: SURG

## 2019-01-16 RX ORDER — CLONAZEPAM 1 MG/1
1 TABLET ORAL 2 TIMES DAILY PRN
Status: DISCONTINUED | OUTPATIENT
Start: 2019-01-16 | End: 2019-01-16

## 2019-01-16 RX ORDER — IBUPROFEN 600 MG/1
600 TABLET ORAL EVERY 6 HOURS SCHEDULED
Status: DISCONTINUED | OUTPATIENT
Start: 2019-01-16 | End: 2019-01-17 | Stop reason: HOSPADM

## 2019-01-16 RX ORDER — PROPOFOL 10 MG/ML
INJECTION, EMULSION INTRAVENOUS AS NEEDED
Status: DISCONTINUED | OUTPATIENT
Start: 2019-01-16 | End: 2019-01-16 | Stop reason: SURG

## 2019-01-16 RX ORDER — LOSARTAN POTASSIUM 25 MG/1
25 TABLET ORAL DAILY
Status: DISCONTINUED | OUTPATIENT
Start: 2019-01-17 | End: 2019-01-16

## 2019-01-16 RX ORDER — FENTANYL CITRATE/PF 50 MCG/ML
50 SYRINGE (ML) INJECTION
Status: COMPLETED | OUTPATIENT
Start: 2019-01-16 | End: 2019-01-16

## 2019-01-16 RX ORDER — MIDAZOLAM HYDROCHLORIDE 1 MG/ML
INJECTION INTRAMUSCULAR; INTRAVENOUS AS NEEDED
Status: DISCONTINUED | OUTPATIENT
Start: 2019-01-16 | End: 2019-01-16 | Stop reason: SURG

## 2019-01-16 RX ORDER — PROMETHAZINE HYDROCHLORIDE 25 MG/ML
12.5 INJECTION, SOLUTION INTRAMUSCULAR; INTRAVENOUS ONCE AS NEEDED
Status: DISCONTINUED | OUTPATIENT
Start: 2019-01-16 | End: 2019-01-16 | Stop reason: HOSPADM

## 2019-01-16 RX ORDER — CEFAZOLIN SODIUM 1 G/50ML
1000 SOLUTION INTRAVENOUS EVERY 8 HOURS
Status: DISCONTINUED | OUTPATIENT
Start: 2019-01-16 | End: 2019-01-16

## 2019-01-16 RX ORDER — IBUPROFEN 600 MG/1
600 TABLET ORAL EVERY 6 HOURS SCHEDULED
Status: DISCONTINUED | OUTPATIENT
Start: 2019-01-16 | End: 2019-01-16 | Stop reason: SDUPTHER

## 2019-01-16 RX ORDER — CLONAZEPAM 1 MG/1
1 TABLET ORAL 2 TIMES DAILY PRN
Status: DISCONTINUED | OUTPATIENT
Start: 2019-01-16 | End: 2019-01-17 | Stop reason: HOSPADM

## 2019-01-16 RX ORDER — PANTOPRAZOLE SODIUM 40 MG/1
40 TABLET, DELAYED RELEASE ORAL
Status: DISCONTINUED | OUTPATIENT
Start: 2019-01-17 | End: 2019-01-17 | Stop reason: HOSPADM

## 2019-01-16 RX ORDER — METOPROLOL SUCCINATE 100 MG/1
100 TABLET, EXTENDED RELEASE ORAL DAILY
Status: DISCONTINUED | OUTPATIENT
Start: 2019-01-17 | End: 2019-01-17 | Stop reason: HOSPADM

## 2019-01-16 RX ORDER — AMLODIPINE BESYLATE 10 MG/1
10 TABLET ORAL DAILY
Status: DISCONTINUED | OUTPATIENT
Start: 2019-01-17 | End: 2019-01-17 | Stop reason: HOSPADM

## 2019-01-16 RX ORDER — ASPIRIN 81 MG/1
81 TABLET ORAL DAILY
Status: DISCONTINUED | OUTPATIENT
Start: 2019-01-17 | End: 2019-01-17 | Stop reason: HOSPADM

## 2019-01-16 RX ORDER — CEFAZOLIN SODIUM 1 G/50ML
1000 SOLUTION INTRAVENOUS ONCE
Status: COMPLETED | OUTPATIENT
Start: 2019-01-16 | End: 2019-01-16

## 2019-01-16 RX ORDER — SODIUM CHLORIDE, SODIUM LACTATE, POTASSIUM CHLORIDE, CALCIUM CHLORIDE 600; 310; 30; 20 MG/100ML; MG/100ML; MG/100ML; MG/100ML
125 INJECTION, SOLUTION INTRAVENOUS CONTINUOUS
Status: DISCONTINUED | OUTPATIENT
Start: 2019-01-16 | End: 2019-01-17 | Stop reason: HOSPADM

## 2019-01-16 RX ORDER — LOSARTAN POTASSIUM 25 MG/1
25 TABLET ORAL DAILY
Status: DISCONTINUED | OUTPATIENT
Start: 2019-01-17 | End: 2019-01-17 | Stop reason: HOSPADM

## 2019-01-16 RX ORDER — SODIUM CHLORIDE, SODIUM LACTATE, POTASSIUM CHLORIDE, CALCIUM CHLORIDE 600; 310; 30; 20 MG/100ML; MG/100ML; MG/100ML; MG/100ML
100 INJECTION, SOLUTION INTRAVENOUS CONTINUOUS
Status: DISCONTINUED | OUTPATIENT
Start: 2019-01-16 | End: 2019-01-17 | Stop reason: HOSPADM

## 2019-01-16 RX ORDER — HYDROMORPHONE HCL/PF 1 MG/ML
0.5 SYRINGE (ML) INJECTION
Status: DISCONTINUED | OUTPATIENT
Start: 2019-01-16 | End: 2019-01-16 | Stop reason: HOSPADM

## 2019-01-16 RX ORDER — CEFAZOLIN SODIUM 2 G/50ML
2000 SOLUTION INTRAVENOUS EVERY 8 HOURS
Status: DISCONTINUED | OUTPATIENT
Start: 2019-01-16 | End: 2019-01-17 | Stop reason: HOSPADM

## 2019-01-16 RX ORDER — KETAMINE HYDROCHLORIDE 50 MG/ML
INJECTION, SOLUTION, CONCENTRATE INTRAMUSCULAR; INTRAVENOUS AS NEEDED
Status: DISCONTINUED | OUTPATIENT
Start: 2019-01-16 | End: 2019-01-16 | Stop reason: SURG

## 2019-01-16 RX ORDER — IBUPROFEN 600 MG/1
600 TABLET ORAL EVERY 6 HOURS PRN
Status: DISCONTINUED | OUTPATIENT
Start: 2019-01-16 | End: 2019-01-17 | Stop reason: HOSPADM

## 2019-01-16 RX ORDER — MAGNESIUM HYDROXIDE 1200 MG/15ML
LIQUID ORAL AS NEEDED
Status: DISCONTINUED | OUTPATIENT
Start: 2019-01-16 | End: 2019-01-16 | Stop reason: HOSPADM

## 2019-01-16 RX ORDER — ZOLPIDEM TARTRATE 5 MG/1
10 TABLET ORAL
Status: DISCONTINUED | OUTPATIENT
Start: 2019-01-16 | End: 2019-01-17 | Stop reason: HOSPADM

## 2019-01-16 RX ORDER — NEBIVOLOL 10 MG/1
20 TABLET ORAL DAILY
Status: DISCONTINUED | OUTPATIENT
Start: 2019-01-17 | End: 2019-01-16

## 2019-01-16 RX ORDER — FENTANYL CITRATE 50 UG/ML
INJECTION, SOLUTION INTRAMUSCULAR; INTRAVENOUS AS NEEDED
Status: DISCONTINUED | OUTPATIENT
Start: 2019-01-16 | End: 2019-01-16 | Stop reason: SURG

## 2019-01-16 RX ADMIN — HYDROMORPHONE HYDROCHLORIDE 1 MG: 1 INJECTION, SOLUTION INTRAMUSCULAR; INTRAVENOUS; SUBCUTANEOUS at 14:47

## 2019-01-16 RX ADMIN — PROPOFOL 100 MCG/KG/MIN: 10 INJECTION, EMULSION INTRAVENOUS at 14:11

## 2019-01-16 RX ADMIN — OXYCODONE AND ACETAMINOPHEN 1 TABLET: 5; 325 TABLET ORAL at 17:57

## 2019-01-16 RX ADMIN — MIDAZOLAM HYDROCHLORIDE 2 MG: 1 INJECTION, SOLUTION INTRAMUSCULAR; INTRAVENOUS at 14:09

## 2019-01-16 RX ADMIN — HYDROMORPHONE HYDROCHLORIDE 1 MG: 1 INJECTION, SOLUTION INTRAMUSCULAR; INTRAVENOUS; SUBCUTANEOUS at 14:56

## 2019-01-16 RX ADMIN — HYDROMORPHONE HYDROCHLORIDE 0.5 MG: 1 INJECTION, SOLUTION INTRAMUSCULAR; INTRAVENOUS; SUBCUTANEOUS at 15:22

## 2019-01-16 RX ADMIN — CEFAZOLIN SODIUM 2000 MG: 2 SOLUTION INTRAVENOUS at 17:57

## 2019-01-16 RX ADMIN — FENTANYL CITRATE 50 MCG: 50 INJECTION, SOLUTION INTRAMUSCULAR; INTRAVENOUS at 15:09

## 2019-01-16 RX ADMIN — KETAMINE HYDROCHLORIDE 20 MG: 50 INJECTION INTRAMUSCULAR; INTRAVENOUS at 14:11

## 2019-01-16 RX ADMIN — FENTANYL CITRATE 50 MCG: 50 INJECTION, SOLUTION INTRAMUSCULAR; INTRAVENOUS at 15:02

## 2019-01-16 RX ADMIN — SODIUM CHLORIDE, SODIUM LACTATE, POTASSIUM CHLORIDE, AND CALCIUM CHLORIDE 100 ML/HR: .6; .31; .03; .02 INJECTION, SOLUTION INTRAVENOUS at 13:23

## 2019-01-16 RX ADMIN — FENTANYL CITRATE 100 MCG: 50 INJECTION, SOLUTION INTRAMUSCULAR; INTRAVENOUS at 14:10

## 2019-01-16 RX ADMIN — CEFAZOLIN SODIUM 2000 MG: 1 SOLUTION INTRAVENOUS at 14:21

## 2019-01-16 RX ADMIN — PROPOFOL 50 MG: 10 INJECTION, EMULSION INTRAVENOUS at 14:13

## 2019-01-16 RX ADMIN — PRAVASTATIN SODIUM 40 MG: 40 TABLET ORAL at 17:57

## 2019-01-16 RX ADMIN — IBUPROFEN 600 MG: 600 TABLET ORAL at 17:55

## 2019-01-16 NOTE — ANESTHESIA POSTPROCEDURE EVALUATION
Post-Op Assessment Note      CV Status:  Stable    Mental Status:  Alert and awake    Hydration Status:  Stable    PONV Controlled:  None    Airway Patency:  Patent and adequate    Post Op Vitals Reviewed: Yes          Staff: Anesthesiologist, CRNA           /70 (01/16/19 1450)    Temp 97 6 °F (36 4 °C) (01/16/19 1450)    Pulse 59 (01/16/19 1450)   Resp 12 (01/16/19 1450)    SpO2 99 % (01/16/19 1450)

## 2019-01-16 NOTE — H&P (VIEW-ONLY)
CHIEF COMPLAINT:  Chief Complaint   Patient presents with    Right Hand - Swelling, Pain       SUBJECTIVE:  Leidy Pisano is a 66y o  year old male who presents to the office for follow-up after surgery for right thumb I and D washout and wound closure due to wound dehiscence performed 12/4/18  Patient previously had a removal of hardware right wrist  performed 10/17/2018  Patient also had right CMC suspension plasty and right carpal tunnel release performed 08/20/2018  Pt was seen at therapy yesterdsy where he presented with swelling in his right thumb  Pt finished the prescribed Keflex 4 days ago  The therapist called our office and we advised for the patient to be NPO and f/u in the office today  He states he did eat breakfast and has some obligations with his car and can not have surgery today  Denies any fever or chills          PAST MEDICAL HISTORY:  Past Medical History:   Diagnosis Date    Anxiety     Chronic pain disorder     Depression     Fractures     Head injury     Hyperlipidemia     Hypertension        PAST SURGICAL HISTORY:  Past Surgical History:   Procedure Laterality Date    APPENDECTOMY      HAND SURGERY      KNEE SURGERY      OR DRAIN SKIN ABSCESS COMPLIC Right 83/9/7114    Procedure: THUMB [facility] JOINT INCISION AND DRAINAGE;  Surgeon: Panda Mooney MD;  [facility];  Service: Orthopedics    OR REMOVAL DEEP IMPLANT Right 10/17/2018    Procedure: WRIST REMOVAL OF HARDWARE;  Surgeon: Panda Mooney MD;  [facility];  Service: Orthopedics    OR REPAIR INTERCARP/CARP-METACARP JT Right 8/28/2018    Procedure: Alarmandy Kotyk SUSPENSIONPLASTY;  Surgeon: Panda Mooney MD;  [facility];  Service: Orthopedics    OR WRIST [place] LIG Right 8/28/2018    Procedure: ENDOSCOPIC CARPAL TUNNEL RELEASE;  Surgeon: Panda Mooney MD;  [facility];  Service: Orthopedics    WRIST SURGERY         FAMILY HISTORY:  Family History   Problem Relation Age of Onset    Stroke Brother     Heart attack Brother     Colon cancer Mother     Heart disease Father     Lung cancer Father        SOCIAL HISTORY:  Social History   Substance Use Topics    Smoking status: Never Smoker    Smokeless tobacco: Never Used    Alcohol use No      Comment: occasionally       MEDICATIONS:    Current Outpatient Prescriptions:     amLODIPine (NORVASC) 10 mg tablet, Take 10 mg by mouth daily, Disp: , Rfl:     aspirin (ECOTRIN LOW STRENGTH) 81 mg EC tablet, Take 81 mg by mouth daily, Disp: , Rfl:     BYSTOLIC 10 MG tablet, , Disp: , Rfl:     BYSTOLIC 20 MG tablet, 20 mg  , Disp: , Rfl:     clonazePAM (KlonoPIN) 1 mg tablet, 1 mg  , Disp: , Rfl:     ibuprofen (MOTRIN) 600 mg tablet, Take 1 tablet (600 mg total) by mouth every 6 (six) hours as needed (for inflammation, pain), Disp: 30 tablet, Rfl: 1    lansoprazole (PREVACID) 30 mg capsule, lansoprazole 30 mg capsule,delayed release, Disp: , Rfl:     metoprolol succinate (TOPROL-XL) 100 mg 24 hr tablet, metoprolol succinate  mg tablet,extended release 24 hr, Disp: , Rfl:     metoprolol succinate (TOPROL-XL) 200 MG 24 hr tablet, , Disp: , Rfl:     oxyCODONE-acetaminophen (PERCOCET)  mg per tablet, oxycodone-acetaminophen 10 mg-325 mg tablet, Disp: , Rfl:     QUEtiapine (SEROquel) 300 mg tablet, quetiapine 300 mg tablet, Disp: , Rfl:     simvastatin (ZOCOR) 40 mg tablet, simvastatin 40 mg tablet, Disp: , Rfl:     valsartan (DIOVAN) 160 mg tablet, 160 mg  , Disp: , Rfl:     valsartan-hydrochlorothiazide (DIOVAN-HCT) 160-12 5 MG per tablet, , Disp: , Rfl:     zolpidem (AMBIEN CR) 12 5 MG CR tablet, zolpidem ER 12 5 mg tablet,extended release,multiphase, Disp: , Rfl:     ALLERGIES:  No Known Allergies    REVIEW OF SYSTEMS:  Review of Systems   Constitutional: Negative for chills, fever and unexpected weight change  HENT: Negative for hearing loss, nosebleeds and sore throat      Eyes: Negative for pain, redness and visual disturbance  Respiratory: Negative for cough, shortness of breath and wheezing  Cardiovascular: Negative for chest pain, palpitations and leg swelling  Gastrointestinal: Negative for abdominal pain, nausea and vomiting  Endocrine: Negative for polydipsia and polyuria  Genitourinary: Negative for dysuria and hematuria  Musculoskeletal: Negative for arthralgias, joint swelling and myalgias  Skin: Negative for rash and wound  Neurological: Negative for light-headedness, numbness and headaches  Psychiatric/Behavioral: Negative for decreased concentration, dysphoric mood and suicidal ideas  The patient is not nervous/anxious  VITALS:  Vitals:    01/15/19 1102   BP: 128/81   Pulse: 72       LABS:  HgA1c: No results found for: HGBA1C  BMP:   Lab Results   Component Value Date    CALCIUM 8 9 10/13/2018    K 4 5 10/13/2018    CO2 26 10/13/2018    CL 94 (L) 10/13/2018    BUN 20 10/13/2018    CREATININE 0 92 10/13/2018       _____________________________________________________  PHYSICAL EXAMINATION:  General: well developed and well nourished, alert, oriented times 3 and appears comfortable  Psychiatric: Normal  HEENT: Trachea Midline, No torticollis  Cardiac:  Regular rate and rhythm  Pulmonary: No respiratory distress  Lung sounds clear to auscultation  Skin: See musculoskeletal exam below  Positive fluctuation   Neurovascular: Sensation Intact to the Median, Ulnar, Radial Nerve, Motor Intact to the Median, Ulnar, Radial Nerve and Pulses Intact    MUSCULOSKELETAL EXAMINATION:  Right hand over 1st metacarpal :Positive fluctuation around the incision for the previous [facility] arthroplasty  Normal skin temperature  Concerns for infection  We will use a needle to see if there is any fluid       ___________________________________________________  STUDIES REVIEWED:  No studies reviewed         PROCEDURES PERFORMED:  Procedures  Right hand over 1st metacarpal: The area was prepped and an 18 gauge needed was used to express a moderate amount of purulent drainage was expressed and a culture was obtained  The culture will be sent STAT  A compressive dressing was placed       _____________________________________________________  ASSESSMENT/PLAN:    Right hand infection s/p surgical procedures  * No oral antibiotics needed at this time  We will give IV antibiotics AFTER intraoperative cultures tomorrow  * Patient will need to be NPO tonight and he voiced understanding  * The patient has purulent drainage and will need a formal I&D in surgery where we can wash this out fully  * Culture obtained today of the purulent drainage and sent STAT  * Surgery: I&D right hand   * Consent obtained  * Patient ate today, so we will schedule surgery tomorrow  * Anesthesia: IV sedation with local  * We will obtain intraoperative cultures, and then the patient will be given IV ancef  * F/u 1 week with my NANCY Carrillo  * No hand therapy needed at this time  Diagnoses and all orders for this visit:    Infection of hand  -     Case request operating room: INCISION AND DRAINAGE (I&D) EXTREMITY: Right hand; Standing  -     Case request operating room: INCISION AND DRAINAGE (I&D) EXTREMITY: Right hand  -     Wound culture and Gram stain; Future    Other orders  -     Diet NPO; Sips with meds; Standing  -     Height and weight upon arrival; Standing  -     Void on call to OR; Standing  -     Insert peripheral IV; Standing  -     ceFAZolin (ANCEF) 1,000 mg in dextrose 5 % 100 mL IVPB; Infuse 1,000 mg into a venous catheter once         Follow Up:  Return for 7 days after surgery with NANCY Carrillo        To Do Next Visit:  Re-evaluation of current issue and Sutures out      Scribe Attestation    I,:   Baylee Jones PA-C am acting as a scribe while in the presence of the attending physician :        I,:   Katie Wong MD personally performed the services described in this documentation    as scribed in my presence :

## 2019-01-16 NOTE — OP NOTE
OPERATIVE REPORT  PATIENT NAME: Joel Cast    :  1960  MRN: [id number]  Pt Location: [facility]OM 03    SURGERY DATE: 2019    Surgeon(s) and Role:     * Francesco Ivory MD - Primary    Preop Diagnosis:  Infection of Right hand [L08 9] thumb base, s/p revision CMC supensionplasty    Post-Op Diagnosis Codes:     * Infection of right hand [L08 9] thumb base s/p revision CMC supensionplasty    Procedure(s) (LRB):  RIGHT HAND INCISION AND DRAINAGE (I&D) thumb base    Specimen(s):  * No specimens in log *    Estimated Blood Loss:  Minimal    Drains: One vessel loop (yellow) placed from wound edge to wound edge  Anesthesia Type:   IV Sedation with Anesthesia    Operative Indications:  Infection of right hand [L08 9] thumb base s/p revision CMC supensionplasty    Operative Findings:  Cloudy purulent drainage from base of right thumb  Complications:   None    Indication for procedure:  Patient is a 82-year-old right-hand-dominant male who was undergone multiple right thumb procedure with the latest a revision thumb CMC suspension plasty  Patient suffered an infection for which she underwent irrigation debridement and placed on oral antibiotics  Patient was doing well but eventually suffered increased discomfort in the base of the right thumb  Patient was seen in the office with erythema and swelling with significant pain  The area was aspirated and purulent fluid was drained  This was swabbed and sent for culture and sensitivity  Patient was then set up for irrigation debridement of right thumb infection  Risks and benefits of the surgery were explained to the patient he did understand and did wish to proceed  Procedure and Technique:  Patient was identified in the preop screening area  Consent was signed and verified after identifying the correct operative site  Patient was taken back to the operating room where IV sedation was performed    Patient's right upper extremity was then prepped and draped normal fashion chlorhexidine solution  The arm was then elevated exsanguinated with an Esmarch and a tourniquet placed about the brachium was inflated to 250 mm Hg  The Esmarch was removed  A longitudinal incision was made over the base of the right thumb in the area of the previous well-healed incision  A 2-3 mm margin of skin was excised from around the wound to freshen the edges with good bleeding subcutaneous tissue  This was done in such a manner as to create an elliptical wound for easier closure  Wound was opened and purulent drainage was detected  This was swabbed and sent for culture and sensitivity  The wound was found to have a pocket within the area of fibrous tissue filling the trapezial void that was continuous with the skin wound  The overall wound measured 1cm wide by 1 cm deep  The wound was then thoroughly debrided to a depth of the base of the trapezial void (1cm) using rongeurs and scalpel blade to the margins of the trapezoid and thumb metacarpal base  The fibrous tissue was relatively avascular with minimal bleeding noted  There were no occluded pockets of fluid collection  The wound was then irrigated with copious amounts of saline solution impregnated with bacitracin solution  Once adequate debridement and irrigation was performed the wound was then closed loosely with 4 nylon suture interrupted horizontal mattress fashion  A large vessel loop was used as a drain that traversed the entire length of the wound from 1 edge to the other with both hands protruding from the wound  The wound was then dressed in a sterile dressing and patient was sent to the PACU in stable condition       I was present for the entire procedure    Patient Disposition:  PACU     SIGNATURE: Jared Li MD  DATE: January 16, 2019  TIME: 1:51 PM

## 2019-01-16 NOTE — DISCHARGE INSTR - AVS FIRST PAGE
Post Operative Instructions    You have had surgery on your arm today, please read and follow the information below:  · Elevate your hand above your elbow during the next 24-48 hours to help with swelling  · Place your hand and arm over your head with motion at your shoulder three times a day  · Do not apply any cream/ointment/oil to your incisions including antibiotics  · Do not soak your hands in standing water (dishwater, tubs, Jacuzzi's, pools, etc ) until given permission (typically 2-3 weeks after injury)    Call the office at [phone]  if you notice any:  · Increased numbness or tingling of your hand or fingers that is not relieved with elevation  · Increasing pain that is not controlled with medication  · Difficulty chewing, breathing, swallowing  · Chest pains or shortness of breath  · Fever over 101 4 degrees  Bandage: Remove bandage after 2 days  Motion: Move fingers into a fist 5 times a day, DO NOT move any splinted fingers  Weight bearing status: The operated extremity should be non-weight bearing until further notice  Ice: Ice for 10 minutes every hour as needed for swelling x 24 hours  Sling: No sling necessary  Pain medication:   After surgery, we would like you to take Ibuprofen 600mg one tablet by mouth every 6 hours with food (at breakfast, lunch and dinner)  AND Tylenol 500 mg one tablet by mouth every 6 hours  (at breakfast, lunch and dinner) for 5-7 days after your surgery  Please take these medication EVERYDAY after surgery for 5-7 days, and not just as needed  You can take these medications at the same time  Taking these medications after surgery will limit your need for prescription pain medication  Follow-up Appointment: 7-10 days with Dr Sania Isbell PA-C  Occupational Therapy: Not needed at this time      Please call the office at [phone] if you have any questions or concerns regarding your post-operative care

## 2019-01-17 ENCOUNTER — TELEPHONE (OUTPATIENT)
Dept: OBGYN CLINIC | Facility: CLINIC | Age: 59
End: 2019-01-17

## 2019-01-17 RX ORDER — SULFAMETHOXAZOLE AND TRIMETHOPRIM 800; 160 MG/1; MG/1
1 TABLET ORAL EVERY 12 HOURS SCHEDULED
Qty: 28 TABLET | Refills: 0 | Status: SHIPPED | OUTPATIENT
Start: 2019-01-17 | End: 2019-01-31

## 2019-01-17 RX ORDER — CLINDAMYCIN HYDROCHLORIDE 300 MG/1
300 CAPSULE ORAL 4 TIMES DAILY
Qty: 56 CAPSULE | Refills: 0 | Status: SHIPPED | OUTPATIENT
Start: 2019-01-17 | End: 2019-01-31

## 2019-01-17 NOTE — PROGRESS NOTES
Called by patients primary nurse to assist with patient  When entering the room patient was standing up appearing very upset about his medication orders  Primary RN and SLIM both at bedside attempting to assist patient  Pt nonconsolable, repeatedly stating that he is leaving  Pt was instructed that he is able to sign out against medical advice, but it is preferred that he stay the night and we can fix his medications  Patient states that he does not care "give me my meds, and I am leaving "   Pt signed AMA paperwork and instructed to wait until we can get discharge instructions  Pt states "No, I'm leaving  I don't care  I''ll call Dr Lindsey Gamboa in the morning " Nursing provided him instructions about leaving AMA, and instructed him that he needs to come back to the ER if anything happens at home over night  Nursing returned medications to patient, patient agreed all medications were present  All belongings were given to patient and patients daughter  Nursing provided Dr White Drafts office number to follow up in the morning

## 2019-01-17 NOTE — PROGRESS NOTES
I have spoke to the pharmacist twice tonight about the patient's medications due to duplicate orders in a class of medications (both beta blockers and arbs)  In a previous conversation with the patient, he informed me that he is supposed to take these medications when his blood pressure is too high  The pharmacist and I were not comfortable of giving him the two medications in the same class and adjusted his medication, especially since his vitals would be monitored  The patient's nurse called me twice about adjusting his percocet medication, which was addressed and put in as a non-formulary, so the pharmacy could allow the patient to give him his own medication from home (while supervised)  This order was placed, and the patient was made aware, but he was still not happy  The patient also voiced concerns about his Klonopin, which I continued as it was in the computer for his home medication dose  However, the patient states he takes it more frequently  I was not comfortable adjusting the dose, so a SLIM consult was placed to see if they could help with the medications at a safe level  I was called by the nurse again, being told that the patient wanted to leave AMA  I advised against this, but stated that if he did leave, he would need to follow up with Dr Luis Carlson tomorrow to have the drain pulled  The patient was also told he had a drain placed when he was in PACU  Infectious disease has not even seen the patient yet to evaluate for his multiple infections  I then called Dr Luis Carlson to discuss the situation and steps that I had taken  If the patient wants to leave AMA, there is nothing that we can do  I was just informed by the SLIM on call overnight that the patient left AMA  The [facility] provider tried to do the discharge instructions but was unable to because an H&P wasn't signed        I will have our office call the patient tomorrow, so that he can come in to have the drain pulled and so we can place an outpatient consult for Infectious disease and place him on antibiotics

## 2019-01-17 NOTE — NURSING NOTE
Patient admitted this evening s/p R hand I and D  Agitation noted at time of admission  Patient noted to still have effects from pain medication given s/p procedure in PACU (unsteady gait slurred speech, unable to follow all commands, hypotensive) however asking for more pain medication  This nurse did not medicate given level LOC  Head to toe completed, PTA medication review attemtped several times however patient could not stay on topic and was extremely upset this nurse collected medications brought from home and sent to our pharmacy as per policy  Once patient was more alert, this nurse medicated pt for pain  When patient was aware dose of Percocet was 5/325 vs the dose he is accustomed to once again became agitated stating "You're screwing up my meds, I take percocet 10/325  about 5 time a day and I take Klonopin 1mg 4 times a day when at home"  This writer was going to administer Klonopin however order specified for seizures  I advised pt I was going to call md to adjust clinical indication and then would give medication  Aggressive body language noted once again along with yesienna VIDALES, Dr Rupal Wright asked that I call Internal medicine  Paged Internal medicine, no call returned  Jada Trinh, call returned, Surekha Serrano suggested paging ortho  Ortho paged, Perri Campo PA returned call  Roseline Murray informed of assessment and pt concern of medications  New orders for SLIM consult to address medications  Percocet 10/325 PO Q6 verbal order received however could not be placed since our pharmacy does not carry said dose  Evan quiñonez and made aware of consult ordered  I updated patient on steps that I had taken to adjust medications out of courtesy and attempted PTA medication review once again, pt became extremely agitated again  Charge nurse notified  Placed call to Roseline Murray and updated her on need for order to be placed for non formulary med   As we were speaking patient walked out in to the hallway and stated he was leaving AMA and his daughter was going to pick him up and provide transport  Cal stated she would call Dr Nick Andres to sort out non formulary medication and would call me back  NANCY Weldon at that time arrived for [facility] consult, I updated him on the situation and obtained AMA form  Charge nurse present at that time  This writer tried to administer Klonopin and Seroquel as ordered but patient refused medications and asked we return his at home medications  Patient was informed that Viet CRUZ stated he must visit Dr Perez Artist office on 1/17/2019 to have drain pulled  Charge nurse Alvarez Alejandra took over patient care and discharged patient  AMA for signed and sent to medical records  Belongings and medications sent with patient

## 2019-01-17 NOTE — UTILIZATION REVIEW
Initial Clinical Review    Age/Sex: 66 y o  male  Surgery Date: 1/15  Procedure : RIGHT HAND INCISION AND DRAINAGE (I&D) thumb base   findings : Cloudy purulent drainage from base of right thumb      Anesthesia: IV Sedation with Anesthesia     Admission Orders: Date/Time/Statement: 1/16/19 @ 1529   Orders Placed This Encounter   Procedures    Inpatient Admission     Standing Status:   Standing     Number of Occurrences:   1     Order Specific Question:   Admitting Physician     Answer:   Sherwin Tavarez     Order Specific Question:   Level of Care     Answer:   Med Surg [16]     Order Specific Question:   Estimated length of stay     Answer:   More than 2 Midnights     Comments:   depends on what infectious disease says     Order Specific Question:   Certification     Answer:   I certify that inpatient services are medically necessary for this patient for a duration of greater than two midnights  See H&P and MD Progress Notes for additional information about the patient's course of treatment       Vital Signs: /60 (BP Location: Left arm)   Pulse 60   Temp 97 6 °F (36 4 °C) (Oral)   Resp 18   Ht 5' 9" (1 753 m)   Wt 91 2 kg (201 lb 1 oz)   SpO2 98%   BMI 29 69 kg/m²   Diet: reg diet   Mobility: act as carmen   DVT Prophylaxis: SCD  Pain Control:   Pain Medications             aspirin (ECOTRIN LOW STRENGTH) 81 mg EC tablet Take 81 mg by mouth daily    clonazePAM (KlonoPIN) 1 mg tablet Take 1 mg by mouth 4 (four) times a day as needed for anxiety      ibuprofen (MOTRIN) 600 mg tablet Take 1 tablet (600 mg total) by mouth every 6 (six) hours as needed (for inflammation, pain)    oxyCODONE-acetaminophen (PERCOCET)  mg per tablet oxycodone-acetaminophen 10 mg-325 mg tablet    QUEtiapine (SEROquel) 300 mg tablet quetiapine 300 mg tablet      up and OOB as carmen   Elevate Operative limb   Ice to affected area   Remove dressing in 48 hrs   1/16 BMP, CBC Na  129, cl   94  H&H   10 5  30 6    1/16 pt left [facility]

## 2019-01-17 NOTE — PHYSICIAN ADVISOR
Current patient class: Inpatient  The patient is currently on Hospital Day: 1 at [address]      The patient was admitted to the hospital at [id number] 18 02 19 on 1/16/19 for the following diagnosis:  Infection of hand [L08 9]       There is documentation in the medical record of an expected length of stay of at least 2 midnights  The patient is therefore expected to satisfy the 2 midnight benchmark and given the 2 midnight presumption is appropriate for INPATIENT ADMISSION  Given this expectation of a satisfying stay, CMS instructs us that the patient is most often appropriate for inpatient admission under part A provided medical necessity is documented in the chart  After review of the relevant documentation, labs, vital signs and test results, the patient is appropriate for INPATIENT ADMISSION  Admission to the hospital as an inpatient is a complex decision making process which requires the practitioner to consider the patients presenting complaint, history and physical examination and all relevant testing  With this in mind, in this case, the patient was deemed appropriate for INPATIENT ADMISSION  After review of the documentation and testing available at the time of the admission I concur with this clinical determination of medical necessity  Rationale is as follows: The patient is a 74-year-old male who was evaluated by Orthopedics in the office on January 15, 2019  He had been prescribed Keflex four days prior to this  History includes " right thumb I and D washout and wound closure due to wound dehiscence performed 12/4/18  Patient previously had a removal of hardware right wrist  performed 10/17/2018  Patient also had right CMC suspension plasty and right carpal tunnel release performed 08/20/2018 "    He was diagnosed with right hand infection and the plan was to start intravenous antibiotics after intraoperative cultures    The patient was unable to agree to surgery on that day, but returned the following day  On January 16th, he underwent right hand incision and drainage of the thumb base  Purulent drainage was detected  The patient was assigned inpatient status anticipating at least a two midnight hospitalization for infection control and intravenous antibiotics  Please refer to the extensive nursing notes  Postoperatively, he had unsteady gait, slurred speech, was unable to follow commands, but was requesting more medication for pain and became agitated  The patient signed the AMA forms but refused to wait for discharge instructions  The patient remains appropriate for inpatient level care as leaving against medical advice is an exclusion to the two midnight benchmark  The patient was not considered medically stable for discharge  The patients vitals on arrival were ED Triage Vitals   Temperature Pulse Respirations Blood Pressure SpO2   01/16/19 1314 01/16/19 1314 01/16/19 1314 01/16/19 1314 01/16/19 1314   97 9 °F (36 6 °C) 70 18 108/60 95 %      Temp Source Heart Rate Source Patient Position - Orthostatic VS BP Location FiO2 (%)   01/16/19 1314 01/16/19 1314 01/16/19 1618 01/16/19 1618 --   Temporal Monitor Lying Left arm       Pain Score       01/16/19 1314       9           Past Medical History:   Diagnosis Date    Anxiety     Chronic pain disorder     Depression     Fractures     Head injury     Hyperlipidemia     Hypertension      Past Surgical History:   Procedure Laterality Date    APPENDECTOMY      HAND SURGERY      KNEE SURGERY      TN DRAIN SKIN ABSCESS COMPLIC Right 60/5/8899    Procedure: THUMB [facility] JOINT INCISION AND DRAINAGE;  Surgeon: Francesco Ivory MD;  Location: [facility];  Service: Orthopedics    TN DRAIN SKIN ABSCESS SIMPLE Right 1/16/2019    Procedure: HAND INCISION AND DRAINAGE (I&D);   Surgeon: Francesco Ivory MD;  [facility]: [facility];  Service: Orthopedics    TN REMOVAL DEEP IMPLANT Right 10/17/2018    Procedure: WRIST REMOVAL OF HARDWARE;  Surgeon: Chaz Ramires MD;  [facility]: [facility];  Service: Orthopedics    SC REPAIR INTERCARP/CARP-METACARP JT Right 8/28/2018    Procedure: Lena Valdes SUSPENSIONPLASTY;  Surgeon: Chaz Ramires MD;  [facility]: [facility];  Service: Orthopedics    SC WRIST Peggye Peaks LIG Right 8/28/2018    Procedure: ENDOSCOPIC CARPAL TUNNEL RELEASE;  Surgeon: Chaz Ramires MD;  [facility]: [facility];  Service: Orthopedics    WRIST SURGERY             Consults have been placed to:   None    Vitals:    01/16/19 1500 01/16/19 1515 01/16/19 1530 01/16/19 1618   BP: 106/67 98/56 112/55 101/60   BP Location:    Left arm   Pulse: 58 (!) 54 58 60   Resp: 12 (!) 10 16 18   Temp:   98 °F (36 7 °C) 97 6 °F (36 4 °C)   TempSrc:    Oral   SpO2: 95% 98% 98% 98%   Weight:    91 2 kg (201 lb 1 oz)   Height:    5' 9" (1 753 m)       Most recent labs:    Recent Labs      01/16/19   1658  01/16/19   1659   WBC  8 67   --    HGB  10 5*   --    HCT  30 6*   --    PLT  250   --    K   --   4 7   CALCIUM   --   9 1   BUN   --   9   CREATININE   --   0 74       Scheduled Meds:  Continuous Infusions:  No current facility-administered medications for this encounter  PRN Meds:      Surgical procedures (if appropriate):  Procedure(s):  HAND INCISION AND DRAINAGE (I&D)

## 2019-01-18 ENCOUNTER — OFFICE VISIT (OUTPATIENT)
Dept: OBGYN CLINIC | Facility: CLINIC | Age: 59
End: 2019-01-18

## 2019-01-18 ENCOUNTER — EVALUATION (OUTPATIENT)
Dept: OCCUPATIONAL THERAPY | Facility: CLINIC | Age: 59
End: 2019-01-18
Payer: MEDICARE

## 2019-01-18 VITALS
HEART RATE: 84 BPM | HEIGHT: 69 IN | BODY MASS INDEX: 29.77 KG/M2 | DIASTOLIC BLOOD PRESSURE: 101 MMHG | SYSTOLIC BLOOD PRESSURE: 159 MMHG | WEIGHT: 201 LBS

## 2019-01-18 DIAGNOSIS — M19.049 CMC ARTHRITIS: Primary | ICD-10-CM

## 2019-01-18 DIAGNOSIS — L08.9 INFECTION OF HAND: ICD-10-CM

## 2019-01-18 DIAGNOSIS — Z48.89 AFTERCARE FOLLOWING SURGERY: Primary | ICD-10-CM

## 2019-01-18 LAB
BACTERIA SPEC ANAEROBE CULT: NORMAL
BACTERIA SPEC ANAEROBE CULT: NORMAL
BACTERIA TISS AEROBE CULT: ABNORMAL
BACTERIA TISS AEROBE CULT: ABNORMAL
BACTERIA WND AEROBE CULT: ABNORMAL
GRAM STN SPEC: ABNORMAL

## 2019-01-18 PROCEDURE — L3808 WHFO, RIGID W/O JOINTS: HCPCS

## 2019-01-18 PROCEDURE — G8984 CARRY CURRENT STATUS: HCPCS

## 2019-01-18 PROCEDURE — G8985 CARRY GOAL STATUS: HCPCS

## 2019-01-18 PROCEDURE — G8986 CARRY D/C STATUS: HCPCS

## 2019-01-18 PROCEDURE — 99024 POSTOP FOLLOW-UP VISIT: CPT | Performed by: ORTHOPAEDIC SURGERY

## 2019-01-18 RX ORDER — FLUTICASONE PROPIONATE 50 MCG
SPRAY, SUSPENSION (ML) NASAL
COMMUNITY
Start: 2019-01-02

## 2019-01-18 RX ORDER — IBUPROFEN 800 MG/1
TABLET ORAL
COMMUNITY
Start: 2018-12-31

## 2019-01-18 NOTE — PATIENT INSTRUCTIONS
You had a drain pulled today  You had therapy make you a custom splint  May remove the splint for twice a day dressing changes with gauze to keep the incision covered   Keep splint on and wound covered while showering  An infectious disease consult was placed today  You were provided a copy of the infectious disease consult  Please call them on Monday at [phone] to schedule an appointment very soon  Stop taking Clindamycin  Continue taking Bactrim until it is gone

## 2019-01-18 NOTE — PROGRESS NOTES
Splint     Diagnosis:   1  CMC arthritis       Indication: s/p 3 revision CMC arthroplasties     Location: Right  wrist, hand and thumb  Supplies: Custom Fit Orthotic and Dressing   Splint type: Thumb SPICA forearm-based  Wearing Schedule: Remove for hygiene only  Describe Position: forearm based thumb spica applied to L hand w/ IP included; radial side of hand; ventilated thermaplast splint material     Precautions: Soft tissue repair; educated pt on dressing changes BID; keep covered for showering  Pt verbalizing understanding  Patient or Caregiver expresses understanding of wearing Schedule and Precautions? Yes  Patient or Caregiver able to don/doff orthotic independently? Yes Pt educated on wearing schedule as well as donning/doffing  Written orders provided to patient?  No  Orders Obtained: Written  Orders Obtained from: Conrad Chau  Return for evaluation and treatment No; splint only

## 2019-01-18 NOTE — PROGRESS NOTES
SUBJECTIVE:  Elgin Doe is a 66y o  year old male who presents for follow up after surgery, I&D right hand performed on  1/16/2019  Today patient has complaints of continued hand pain  He signed out of the hospital AMA because he was unhappy with his medications  He was placed on oral Bactrim and Clindamycin, which he states he has been taking  VITALS:  Vitals:    01/18/19 1309   BP: (!) 159/101   Pulse: 84       PHYSICAL EXAMINATION:  General: well developed and well nourished, alert, oriented times 3 and appears comfortable  Psychiatric: Normal    MUSCULOSKELETAL EXAMINATION:  Right hand  Incision: Mild erythema around incision  Drain in place and will be removed today  Nylon sutures intact  No purulent drainage noted  Range of Motion: good IP flexion  Neurovascular status: Neuro intact, good cap refill    LABS    Clinic wound culture from 1-15-19 showed 4+ growth of S  Aureus; Resistant to clinda and suspectible to bactrim    Intraoperative wound cultures: Final results pending  Anerobes: none isolated  Culture and gram stain: 2+ growth S  Aureus  No bacteria seen  No polys  Clinda resistant  Bactrim suspectible    STUDIES REVIEWED:  No studies reviewed  PROCEDURES PERFORMED:  Procedures  No Procedures performed today      ASSESSMENT/PLAN:    Right hand infection  * Drain pulled today  * Patient sent to therapy today for wound care and custom splint today  * Wound care instructions: May remove the splint for BID dressing changes   Keep covered while showering  * Stop clindamycin due to culture results  Continue Bactrim until gone  * Infectious disease consult placed  I tried to call the ID office to help the patient schedule, but the office is closed at this time  Patient was given the number and advised to call Monday to schedule an appointment  He was provided with the ID referral paperwork      * After visit summary tells the patient to stop the Clindamycin and all the instructions were written in the AVS   This was provided to the patient  * Patient will f/u in 1 week for wound check and xrays  Patient will f/u in 2 weeks with Rachell Valdez  Elevated Blood pressure  * The patient's blood pressure was elevated today  I recommended following up with your Primary Care Provider within the next week  If you need help finding a Primary Care Provider, you can call [facility] at [phone]  FOLLOW UP:  Return in about 1 week (around 1/25/2019)        TO DO AT NEXT VISIT:  Re-evaluation of current issue and X-rays of the  right  wrist including Gema views (to evaluate for osteomyelitis)       Scribe Attestation    I,:   Nikki Scott PA-C am acting as a scribe while in the presence of the attending physician :        I,:   Sherlyn Beck MD personally performed the services described in this documentation    as scribed in my presence :

## 2019-01-22 ENCOUNTER — TELEPHONE (OUTPATIENT)
Dept: INFECTIOUS DISEASES | Facility: CLINIC | Age: 59
End: 2019-01-22

## 2019-01-22 ENCOUNTER — APPOINTMENT (OUTPATIENT)
Dept: OCCUPATIONAL THERAPY | Facility: CLINIC | Age: 59
End: 2019-01-22
Payer: MEDICARE

## 2019-01-24 ENCOUNTER — OFFICE VISIT (OUTPATIENT)
Dept: OBGYN CLINIC | Facility: CLINIC | Age: 59
End: 2019-01-24

## 2019-01-24 ENCOUNTER — APPOINTMENT (OUTPATIENT)
Dept: OCCUPATIONAL THERAPY | Facility: CLINIC | Age: 59
End: 2019-01-24
Payer: MEDICARE

## 2019-01-24 ENCOUNTER — APPOINTMENT (OUTPATIENT)
Dept: RADIOLOGY | Facility: CLINIC | Age: 59
End: 2019-01-24
Payer: MEDICARE

## 2019-01-24 VITALS
BODY MASS INDEX: 29.77 KG/M2 | HEART RATE: 66 BPM | HEIGHT: 69 IN | SYSTOLIC BLOOD PRESSURE: 119 MMHG | DIASTOLIC BLOOD PRESSURE: 72 MMHG | WEIGHT: 201 LBS

## 2019-01-24 DIAGNOSIS — Z48.89 AFTERCARE FOLLOWING SURGERY: ICD-10-CM

## 2019-01-24 DIAGNOSIS — Z48.89 AFTERCARE FOLLOWING SURGERY: Primary | ICD-10-CM

## 2019-01-24 DIAGNOSIS — L08.9 INFECTION OF HAND: ICD-10-CM

## 2019-01-24 PROCEDURE — 99024 POSTOP FOLLOW-UP VISIT: CPT | Performed by: PHYSICIAN ASSISTANT

## 2019-01-24 PROCEDURE — 73110 X-RAY EXAM OF WRIST: CPT

## 2019-01-24 NOTE — PROGRESS NOTES
SUBJECTIVE:  Makenna Herrera is a 66y o  year old male who presents for follow up after surgery, I&D right hand performed on  1/16/2019  Today patient has complaints of continued pain and drainage  He believes this may be due to gout and was treated by a rheumatologist in the past at [facility]  He has been taking the antibiotics (bactrim DS) as directed and has a few days left  Denies any fever or chills  He states there is a small amount of drainage in the morning  He has been doing BID dressing changes  VITALS:  Vitals:    01/24/19 1405   BP: 119/72   Pulse: 66       PHYSICAL EXAMINATION:  General: well developed and well nourished, alert, oriented times 3 and appears comfortable  Psychiatric: Normal    MUSCULOSKELETAL EXAMINATION:  Right hand  Incision: Clean, scant amount of serosanguinous drainage noted, with sutures intact  Range of Motion: unchanged  Neurovascular status: Neuro intact, good cap refill      STUDIES REVIEWED:  Images obtained today of the right hand 3 views demonstrate no acute fracture or signs of osteomyelitis  He has had multiple thumb CMC arthroplasties in the past   Hardware in stable position with no evidence of loosening  PROCEDURES PERFORMED:  Procedures  No Procedures performed today      ASSESSMENT/PLAN:    Right hand infection s/p I&D on 1/15/19  * One suture only removed  The incision does not appear to be fully healed and the other sutures will remain until his f/u on Monday with Dr Luis Carlson  The proximal and distal ends were also left with a small gap due to the drain previously being in  * Continue with BID dressing changes  * Xrays of the right hand today did not show any signs of osteomyelitis  * I see that the patient has a right total shoulder replacement scheduled with Dr Katelyn Reyna in [facility] on 2/14/19    I advised the patient to discuss this known hand infection and pending evaluation with infectious disease prior to any surgery  He is aware and says that surgery is now on hold  * An Infectious Disease consult was placed last week  I see in EPIC that the ID office tried calling him but the patient did not call back  I called the ID office and the next available appointment is on 2-6 at 1pm   I scheduled this appointment for the patient and gave him all the information  The patient will also be placed on a cancellation list, so that if there is an appointment available sooner, they will call Yovanny Mills  I did stress the importance to the ID office for him to be seen ASAP  * The patient is convinced this is a gout issue  I advised him that he is welcome to f/u with the rheumatologist that he previously seen, but stressed the importance that he really needs to see the ID provider  * His blood pressure was improved today  FOLLOW UP:  Return in about 1 week (around 1/31/2019)        TO DO AT NEXT VISIT:  Re-evaluation of current issue

## 2019-01-29 ENCOUNTER — TELEPHONE (OUTPATIENT)
Dept: OBGYN CLINIC | Facility: HOSPITAL | Age: 59
End: 2019-01-29

## 2019-01-29 ENCOUNTER — TELEPHONE (OUTPATIENT)
Dept: OBGYN CLINIC | Facility: CLINIC | Age: 59
End: 2019-01-29

## 2019-01-29 ENCOUNTER — TELEPHONE (OUTPATIENT)
Dept: INFECTIOUS DISEASES | Facility: CLINIC | Age: 59
End: 2019-01-29

## 2019-01-29 RX ORDER — SULFAMETHOXAZOLE AND TRIMETHOPRIM 800; 160 MG/1; MG/1
1 TABLET ORAL EVERY 12 HOURS SCHEDULED
Qty: 14 TABLET | Refills: 0 | Status: SHIPPED | OUTPATIENT
Start: 2019-01-29 | End: 2019-02-06

## 2019-01-29 NOTE — TELEPHONE ENCOUNTER
lmom for pt with clear instructions to  the anitbiotics today from the pharmacy and to start them today and also to be npo  Midnight tonight, I did repeat this multiple times so it was clear for patient

## 2019-01-29 NOTE — TELEPHONE ENCOUNTER
Pt is currently scheduled w/ us on 2/6  Had a cancellation for 1/30 with an open spot to get pt in sooner  Called and lm on pt's vm asking him to return our call

## 2019-01-29 NOTE — TELEPHONE ENCOUNTER
This patient showed up to the wrong location for his appointment yesterday  He rescheduled for tomorrow  This was discussed with Dr Thuy Reynolds  We called in a refill of the patient's Bactrim antibiotic and he should take this twice a day for 7 days  Please let the patient know that he needs to pick the medication up today  It was sent electronically to his pharmacy on file  Please also let the patient know that he should be NPO midnight tonight  Dr Thuy Reynolds will evaluate him tomorrow and he may need surgery tomorrow  We will see how he looks at the appointment to make the decision  Please make the NPO status clear to the patient  Thank you

## 2019-01-29 NOTE — TELEPHONE ENCOUNTER
Patient aware he needs to be seen tomorrow  Appt made for 1/30 at 10:45am   Patient aware Bactrim will be called to his pharmacy per Dr Farfan Gins

## 2019-01-29 NOTE — TELEPHONE ENCOUNTER
Patient calling to say that he is noticing pus while changing his bandages this morning  He has been off the Bactrim  For 3 days  He has a follow up appt on 1/31  I spoke with Luma Chopra at the office and she spoke with Dr Liliana Cevallos, this patient should be seen tomorrow and antibiotic refill will be called to pharmacy on file

## 2019-01-30 ENCOUNTER — OFFICE VISIT (OUTPATIENT)
Dept: OBGYN CLINIC | Facility: CLINIC | Age: 59
End: 2019-01-30

## 2019-01-30 VITALS
WEIGHT: 201 LBS | HEART RATE: 96 BPM | BODY MASS INDEX: 29.77 KG/M2 | HEIGHT: 69 IN | SYSTOLIC BLOOD PRESSURE: 146 MMHG | DIASTOLIC BLOOD PRESSURE: 88 MMHG | TEMPERATURE: 97.5 F

## 2019-01-30 DIAGNOSIS — Z48.89 AFTERCARE FOLLOWING SURGERY: Primary | ICD-10-CM

## 2019-01-30 DIAGNOSIS — L08.9 WOUND INFECTION: ICD-10-CM

## 2019-01-30 DIAGNOSIS — G89.29 CHRONIC PAIN OF RIGHT THUMB: ICD-10-CM

## 2019-01-30 DIAGNOSIS — M79.644 CHRONIC PAIN OF RIGHT THUMB: ICD-10-CM

## 2019-01-30 DIAGNOSIS — T14.8XXA WOUND INFECTION: ICD-10-CM

## 2019-01-30 DIAGNOSIS — L08.9 INFECTION OF HAND: ICD-10-CM

## 2019-01-30 PROCEDURE — 99024 POSTOP FOLLOW-UP VISIT: CPT | Performed by: ORTHOPAEDIC SURGERY

## 2019-01-30 RX ORDER — AMOXICILLIN AND CLAVULANATE POTASSIUM 875; 125 MG/1; MG/1
TABLET, FILM COATED ORAL
COMMUNITY
End: 2019-03-21

## 2019-01-30 RX ORDER — GABAPENTIN 300 MG/1
CAPSULE ORAL
COMMUNITY

## 2019-01-30 NOTE — PROGRESS NOTES
CHIEF COMPLAINT:  Chief Complaint   Patient presents with    Right Hand - Follow-up       SUBJECTIVE:  Nichole Levy is a 66y o  year old  male who presents to the office for evaluation after I and D right hand performed 01/16/2019  Patient was hospitalized s/p ID for IV abx, however, he had left hospital AMA and to this date still has not had his ID appointment despite multiple attempts by ID to get in touch with Dorian Ashby  We have placed the patient on oral abx in attempts to provide coverage until he sees infectious disease  Today patient has continue complaints of pain and occasional drainage  Patient is taking the Bactrim as prescribed that was called into the office yesterday due to patient has missed appointment and reports of increased drainage from incision site  Patient states that he changes his dressing every 2 days  Patient previously had incision and drainage performed 12/04/2018 and removal of hardware right wrist performed 10/17/2018  Patient also had right CMC suspension plasty and right carpal tunnel release performed 08/20/2018  PAST MEDICAL HISTORY:  Past Medical History:   Diagnosis Date    Anxiety     Chronic pain disorder     Depression     Fractures     Head injury     Hyperlipidemia     Hypertension        PAST SURGICAL HISTORY:  Past Surgical History:   Procedure Laterality Date    APPENDECTOMY      HAND SURGERY      KNEE SURGERY      SD DRAIN SKIN ABSCESS COMPLIC Right 76/8/3489    Procedure: THUMB [facility] JOINT INCISION AND DRAINAGE;  Surgeon: Shima Flores MD;  [facility];  Service: Orthopedics    SD DRAIN SKIN ABSCESS SIMPLE Right 1/16/2019    Procedure: HAND INCISION AND DRAINAGE (I&D);   Surgeon: Shima Flores MD;  [facility];  Service: Orthopedics    SD REMOVAL DEEP IMPLANT Right 10/17/2018    Procedure: WRIST REMOVAL OF HARDWARE;  Surgeon: Shima Flores MD;  [facility];  Service: Orthopedics    SD REPAIR INTERCARP/CARP-METACARP JT Right 8/28/2018    Procedure: Claire Fritter SUSPENSIONPLASTY;  Surgeon: Sherlyn Beck MD;  [facility];  Service: Orthopedics    OK WRIST [place] LIG Right 8/28/2018    Procedure: ENDOSCOPIC CARPAL TUNNEL RELEASE;  Surgeon: Sherlyn Beck MD;  [facility];  Service: Orthopedics    WRIST SURGERY         FAMILY HISTORY:  Family History   Problem Relation Age of Onset    Stroke Brother     Heart attack Brother     Colon cancer Mother     Heart disease Father     Lung cancer Father        SOCIAL HISTORY:  Social History   Substance Use Topics    Smoking status: Never Smoker    Smokeless tobacco: Never Used    Alcohol use No      Comment: occasionally       MEDICATIONS:    Current Outpatient Prescriptions:     amLODIPine (NORVASC) 10 mg tablet, Take 10 mg by mouth daily, Disp: , Rfl:     amoxicillin-clavulanate (AUGMENTIN) 875-125 mg per tablet, amoxicillin 875 mg-potassium clavulanate 125 mg tablet, Disp: , Rfl:     aspirin (ECOTRIN LOW STRENGTH) 81 mg EC tablet, Take 81 mg by mouth daily, Disp: , Rfl:     BYSTOLIC 20 MG tablet, Take 20 mg by mouth as needed  , Disp: , Rfl:     clindamycin (CLEOCIN) 300 MG capsule, Take 1 capsule (300 mg total) by mouth 4 (four) times a day for 14 days, Disp: 56 capsule, Rfl: 0    clonazePAM (KlonoPIN) 1 mg tablet, Take 1 mg by mouth 4 (four) times a day as needed for anxiety  , Disp: , Rfl:     fluticasone (FLONASE) 50 mcg/act nasal spray, , Disp: , Rfl:     gabapentin (NEURONTIN) 300 mg capsule, gabapentin 300 mg capsule, Disp: , Rfl:     ibuprofen (MOTRIN) 600 mg tablet, Take 1 tablet (600 mg total) by mouth every 6 (six) hours as needed (for inflammation, pain) (Patient taking differently: Take 800 mg by mouth 3 (three) times a day as needed for mild pain (for inflammation, pain)  ), Disp: 30 tablet, Rfl: 1    ibuprofen (MOTRIN) 800 mg tablet, , Disp: , Rfl:     lansoprazole (PREVACID) 30 mg capsule, lansoprazole 30 mg capsule,delayed release, Disp: , Rfl:     metoprolol succinate (TOPROL-XL) 100 mg 24 hr tablet, metoprolol succinate  mg tablet,extended release 24 hr, Disp: , Rfl:     oxyCODONE-acetaminophen (PERCOCET)  mg per tablet, oxycodone-acetaminophen 10 mg-325 mg tablet, Disp: , Rfl:     QUEtiapine (SEROquel) 300 mg tablet, quetiapine 300 mg tablet, Disp: , Rfl:     simvastatin (ZOCOR) 40 mg tablet, simvastatin 40 mg tablet, Disp: , Rfl:     sulfamethoxazole-trimethoprim (BACTRIM DS) 800-160 mg per tablet, Take 1 tablet by mouth every 12 (twelve) hours for 14 days, Disp: 28 tablet, Rfl: 0    sulfamethoxazole-trimethoprim (BACTRIM DS) 800-160 mg per tablet, Take 1 tablet by mouth every 12 (twelve) hours for 7 days, Disp: 14 tablet, Rfl: 0    valsartan (DIOVAN) 160 mg tablet, 160 mg  , Disp: , Rfl:     valsartan-hydrochlorothiazide (DIOVAN-HCT) 160-12 5 MG per tablet, 1 tablet  , Disp: , Rfl:     zolpidem (AMBIEN CR) 12 5 MG CR tablet, zolpidem ER 12 5 mg tablet,extended release,multiphase, Disp: , Rfl:     ALLERGIES:  No Known Allergies    REVIEW OF SYSTEMS:  Review of Systems   Constitutional: Negative for chills, fever and unexpected weight change  HENT: Negative for hearing loss, nosebleeds and sore throat  Eyes: Negative for pain, redness and visual disturbance  Respiratory: Negative for cough, shortness of breath and wheezing  Cardiovascular: Negative for chest pain, palpitations and leg swelling  Gastrointestinal: Negative for abdominal pain, nausea and vomiting  Endocrine: Negative for polydipsia and polyuria  Genitourinary: Negative for dysuria and hematuria  Musculoskeletal: Negative for arthralgias, joint swelling and myalgias  Skin: Negative for rash and wound  Neurological: Negative for light-headedness, numbness and headaches  Psychiatric/Behavioral: Negative for decreased concentration, dysphoric mood and suicidal ideas  The patient is not nervous/anxious  VITALS:  Vitals:    01/30/19 1049   BP: 146/88   Pulse: 96   Temp: 97 5 °F (36 4 °C)       LABS:  HgA1c: No results found for: HGBA1C  BMP:   Lab Results   Component Value Date    CALCIUM 9 1 01/16/2019    K 4 7 01/16/2019    CO2 28 01/16/2019    CL 94 (L) 01/16/2019    BUN 9 01/16/2019    CREATININE 0 74 01/16/2019       _____________________________________________________  PHYSICAL EXAMINATION:  General: well developed and well nourished, alert, oriented times 3 and appears comfortable  Psychiatric: Normal  HEENT: Trachea Midline, No torticollis  Pulmonary: No audible wheezing or respiratory distress   Skin: No masses, erthema, fluctation, ulcerations  Neurovascular: Sensation Intact to the Median, Ulnar, Radial Nerve, Motor Intact to the Median, Ulnar, Radial Nerve and Pulses Intact    MUSCULOSKELETAL EXAMINATION:  RIGHT HAND  No erythema  No warmth  Benign looking around incision site  Gaping of wound noted although crusted over  No active drainage    ___________________________________________________  STUDIES REVIEWED:  X-rays of the right wrist were reviewed from 1/24/19 and demonstrated irregularity at the base of the thumb MC consistent with osteomyelitis  PROCEDURES PERFORMED:  Procedures  No Procedures performed today    _____________________________________________________  ASSESSMENT/PLAN:    Right Thumb infection with osteomyelitis S/P I&D 1/15/19 leaving hospital AMA  Patient has been extremely noncompliant throughout his course of treatment which has been well-documented  His wound today looks relatively benign but I'm concerned of his noncompliance in regard to his ultimate outcome      * sutures were removed today  * dry dressing was applied today, patient was advised to change dressing daily  * Patient was advised to continue Bactrim as prescribed  * patient will follow up in the office 2 weeks  * Pt was advised that it is of the upmost importance to see Infectious disease patient currently has appointment scheduled for February 6, 2018 with Infectious Disease    Follow Up:  No Follow-up on file        To Do Next Visit:  Re-evaluation of current issue        Scribe Attestation    I,:   Jigar Mckeon am acting as a scribe while in the presence of the attending physician :        I,:   Boaz Romero MD personally performed the services described in this documentation    as scribed in my presence :

## 2019-02-05 NOTE — PROGRESS NOTES
2/5/19:  Patient discharged from OT services following an I&D on 1/16/19  Patient had a custom splint fabricated  At another clinic following the I&D  No other OT ordered  Discharge to [facility]  Refer to OT note dated 1/10/19 for final status

## 2019-02-06 ENCOUNTER — OFFICE VISIT (OUTPATIENT)
Dept: INFECTIOUS DISEASES | Facility: CLINIC | Age: 59
End: 2019-02-06
Payer: COMMERCIAL

## 2019-02-06 VITALS
BODY MASS INDEX: 28.58 KG/M2 | DIASTOLIC BLOOD PRESSURE: 85 MMHG | SYSTOLIC BLOOD PRESSURE: 145 MMHG | WEIGHT: 193 LBS | TEMPERATURE: 96.6 F | HEART RATE: 74 BPM | RESPIRATION RATE: 17 BRPM | HEIGHT: 69 IN

## 2019-02-06 DIAGNOSIS — Z87.828 H/O OPEN HAND WOUND: ICD-10-CM

## 2019-02-06 DIAGNOSIS — Z91.19 NONCOMPLIANCE: ICD-10-CM

## 2019-02-06 DIAGNOSIS — L08.9 INFECTION OF HAND: ICD-10-CM

## 2019-02-06 DIAGNOSIS — M86.041 ACUTE HEMATOGENOUS OSTEOMYELITIS OF RIGHT HAND (HCC): Primary | ICD-10-CM

## 2019-02-06 PROCEDURE — 99204 OFFICE O/P NEW MOD 45 MIN: CPT | Performed by: INTERNAL MEDICINE

## 2019-02-06 RX ORDER — CEPHALEXIN 500 MG/1
500 CAPSULE ORAL EVERY 6 HOURS SCHEDULED
Qty: 56 CAPSULE | Refills: 1 | Status: SHIPPED | OUTPATIENT
Start: 2019-02-06 | End: 2019-02-20

## 2019-02-06 NOTE — PATIENT INSTRUCTIONS
We will be in contact with you regarding a set up of an arterial line for IV antibiotics  This will be set up by the office once we hear back from you about a suitable date  Please take the by mouth antibiotic Keflex as prescribed until you start IV antibiotics  We will call to schedule a follow up with you 2 weeks after you have started your IV antibiotics

## 2019-02-06 NOTE — PROGRESS NOTES
Consultation - Infectious Disease   Marvene Litten 66 y o  male MRN: [id number]  Unit/Bed#:  Encounter: [id number]      IMPRESSION & RECOMMENDATIONS:   Impression/Recommendations: This is a 66 y o  male, status post right hand surgery with hardware insertion in right 1st metacarpal and phalanx, has nonhealing wound with multiple episodes of infection, and now has evidence of osteomyelitis of metacarpal     1  Right 1st metacarpal osteomyelitis, with likely ORIF infection  Clinical picture is consistent with osteomyelitis, given multiple episodes of wound infection and nonhealing  X-ray of hand, done a week ago is also suggestive of developing 1st metacarpal osteomyelitis  Fortunately, patient only had MSSA growing from his wound culture consistently, without any other pathogens  He will need long-term IV antibiotic, likely followed by lifelong suppression  I offered patient inpatient admission for expedited treatment plan  Patient refused  He prefers to have this all done as an outpatient  In fact, patient tells me that he would rather put this off until the end a month, to get other medical and social issues taken care of 1st   Will place patient on oral Keflex while waiting for PICC  Arrangement for PICC as an outpatient  High-dose IV cefazolin x 6 weeks, followed by long-term p o  Keflex suppression, possibly lifelong  P o  Keflex for now, pending PICC placement  2  Nonhealing right hand incision  Initial surgery was in August of 2018  Lack of healing is most likely secondary to underlying osteomyelitis  Local wound care per Orthopedic surgery service  3  Chronic right shoulder pain, with right shoulder arthroplasty planned  At this point, I would hold off on this elective surgery until right hand infection is completely resolved  Recommend holding off on elective right shoulder surgery until right hand osteomyelitis is under control  4  History of noncompliance with care    I discussed with the patient in great detail the need to be 100% compliant with his IV antibiotic course, if we are to have any hope of controlling his osteomyelitis  Otherwise, patient is at risk of losing finger and metacarpal       Outpatient and prior inpatient records reviewed in detail  Discussed with patient in detail regarding above plan  Thank you for this consultation  HISTORY OF PRESENT ILLNESS:  Reason for Consult:  Right hand osteomyelitis  HPI: Rosalie Hampton is a 66 y o  male, referred from orthopedic surgery for nonhealing right hand wound and osteomyelitis  In August of last year, patient had surgery done in his right hand/wrist with hardware insertion in the right 1st metacarpal in time  He has had multiple episodes of wound dehiscence and infection  He has had multiple I and D's and multiple courses of antibiotic, Keflex and Bactrim  The wound in the right thumb still has not healed  Patient just finished a course antibiotic recently  He still has purulent drainage  He only has mild pain  No fever or chills  Per review of Orthopedics records, patient has history of noncompliance with care  During the last admission for I&D last month, patient left the hospital AMA  At present, patient states that he is frustrated regarding the wound not healing  Otherwise, he feels stable  He has mild pain in his right hand which has been chronic  No fever and chills, as in above  Patient has had multiple orthopedic surgeries  He also tells me he had right knee infection previously  I have no records of these surgeries are infections  All were done at [place] [id number]  REVIEW OF SYSTEMS:  A complete 12 point system-based review of systems was done  Except for what is noted in HPI above, ROS is otherwise negative      PAST MEDICAL HISTORY:  Past Medical History:   Diagnosis Date    Anxiety     Chronic pain disorder     Depression     Fractures     Head injury     Hyperlipidemia  Hypertension      Past Surgical History:   Procedure Laterality Date    APPENDECTOMY      HAND SURGERY      KNEE SURGERY      ME DRAIN SKIN ABSCESS COMPLIC Right 89/1/0947    Procedure: THUMB [facility] JOINT INCISION AND DRAINAGE;  Surgeon: Arnaldo Alfaro MD;  [facility];  Service: Orthopedics    ME DRAIN SKIN ABSCESS SIMPLE Right 1/16/2019    Procedure: HAND INCISION AND DRAINAGE (I&D); Surgeon: Arnaldo Alfaro MD;  [facility];  Service: Orthopedics    ME REMOVAL DEEP IMPLANT Right 10/17/2018    Procedure: WRIST REMOVAL OF HARDWARE;  Surgeon: Arnaldo Alfaro MD;  [facility];  Service: Orthopedics    ME REPAIR INTERCARP/CARP-METACARP JT Right 8/28/2018    Procedure: Yo Sciara SUSPENSIONPLASTY;  Surgeon: Arnaldo Alfaro MD;  [facility];  Service: Orthopedics    ME WRIST Mace Hesselbach LIG Right 8/28/2018    Procedure: ENDOSCOPIC CARPAL TUNNEL RELEASE;  Surgeon: Arnaldo Alfaro MD;  [facility];  Service: Orthopedics    WRIST SURGERY       Problem list reviewed  FAMILY HISTORY:  Non-contributory    SOCIAL HISTORY:  History   Alcohol Use No     Comment: occasionally     History   Drug Use No     History   Smoking Status    Never Smoker   Smokeless Tobacco    Never Used       ALLERGIES:  No Known Allergies    MEDICATIONS:  All current active medications have been reviewed  Patient is currently not on any antibiotic  PHYSICAL EXAM:  Vitals:  Temperature: (!) 96 6 °F (35 9 °C)     Physical Exam:  General:  Well-nourished, well-developed, in no acute distress  Awake, alert and oriented x 3    Eyes:  Conjunctive clear with no hemorrhages or effusions  Oropharynx:  No ulcers, no lesions, pharynx benign, no tonsillitis  Neck:  Supple, no lymphadenopathy, no mass, nontender  Lungs:  Expansion symmetric, no rales, no wheezing, no accessory muscle use  Cardiac:  Regular rate and rhythm, normal S1, normal S2, no murmurs  Abdomen:  Soft, nondistended, non-tender, no HSM  Extremities:  Right hand with nonhealing wound over 1st metacarpal   Mild serial purulent drainage  No erythema/warmth  No tenderness  No fluctuance  Skin:  No rashes, no ulcers  Neurological:  Moves all four extremities spontaneously, sensation grossly intact    LABS, IMAGING, & OTHER STUDIES:  Lab Results:  I have personally reviewed pertinent labs  Culture results review  Multiple wound cultures grew MSSA  Imaging Studies:   I have personally reviewed pertinent imaging study reports and images in PACS  Right hand x-ray from late January reviewed  There is evidence of osteomyelitis of 1st metacarpal     EKG, Pathology, and Other Studies:   I have personally reviewed pertinent reports

## 2019-02-14 ENCOUNTER — OFFICE VISIT (OUTPATIENT)
Dept: OBGYN CLINIC | Facility: CLINIC | Age: 59
End: 2019-02-14

## 2019-02-14 VITALS
BODY MASS INDEX: 28.58 KG/M2 | HEIGHT: 69 IN | SYSTOLIC BLOOD PRESSURE: 136 MMHG | DIASTOLIC BLOOD PRESSURE: 91 MMHG | WEIGHT: 193 LBS | HEART RATE: 74 BPM

## 2019-02-14 DIAGNOSIS — L08.9 INFECTION OF HAND: Primary | ICD-10-CM

## 2019-02-14 PROCEDURE — 99024 POSTOP FOLLOW-UP VISIT: CPT | Performed by: ORTHOPAEDIC SURGERY

## 2019-02-14 NOTE — PATIENT INSTRUCTIONS
Daily dry dressing changes to the right hand    Contact Infectious Disease provider about how long the inpatient IV antibiotics are needed  According to the Infectious disease note, 6 weeks of IV antibiotics are needed, followed by oral antibiotic (likely Keflex)  That does not mean you need to be in the hospital for 6 weeks  Contact your Infectious Disease provider for clarity on how long you would need to be in the hospital   Right now continue the oral Keflex

## 2019-02-14 NOTE — PROGRESS NOTES
CHIEF COMPLAINT:  Chief Complaint   Patient presents with    Right Hand - Post-op       SUBJECTIVE:  Maurice Reyes is a 66y o  year old  male who presents for f/u to right hand infection s/p multiple surgeries to the right hand  Patient recently saw the ID provider and is currently taking keflex  He has a PICC line scheduled for early March  He declines a hospital admission for PICC line placement earlier and had some medical and social issues and needed to delay the PICC line until March  He has been doing daily dressing changes and denies any fever or chills  He has had scant drainage  PAST MEDICAL HISTORY:  Past Medical History:   Diagnosis Date    Anxiety     Chronic pain disorder     Depression     Fractures     Head injury     Hyperlipidemia     Hypertension        PAST SURGICAL HISTORY:  Past Surgical History:   Procedure Laterality Date    APPENDECTOMY      HAND SURGERY      KNEE SURGERY      FL DRAIN SKIN ABSCESS COMPLIC Right 84/3/5409    Procedure: THUMB Aia 16 JOINT INCISION AND DRAINAGE;  Surgeon: Blanca Mackenzie MD;  [facility];  Service: Orthopedics    FL DRAIN SKIN ABSCESS SIMPLE Right 1/16/2019    Procedure: HAND INCISION AND DRAINAGE (I&D);   Surgeon: Blanca Mackenzie MD;  [facility];  Service: Orthopedics    FL REMOVAL DEEP IMPLANT Right 10/17/2018    Procedure: WRIST REMOVAL OF HARDWARE;  Surgeon: Blanca Mackenzie MD;  [facility];  Service: Orthopedics    FL REPAIR INTERCARP/CARP-METACARP JT Right 8/28/2018    Procedure: Rigo Rajan;  Surgeon: Blanca Mackenzie MD;  [facility];  Service: Orthopedics    FL WRIST Volney Saltness LIG Right 8/28/2018    Procedure: ENDOSCOPIC CARPAL TUNNEL RELEASE;  Surgeon: Blanca Mackenzie MD;  [facility];  Service: Orthopedics    WRIST SURGERY         FAMILY HISTORY:  Family History   Problem Relation Age of Onset    Stroke Brother     Heart attack Brother     Colon cancer Mother     Heart disease Father     Lung cancer Father        SOCIAL HISTORY:  Social History     Tobacco Use    Smoking status: Never Smoker    Smokeless tobacco: Never Used   Substance Use Topics    Alcohol use: No     Comment: occasionally    Drug use: No       MEDICATIONS:    Current Outpatient Medications:     amLODIPine (NORVASC) 10 mg tablet, Take 10 mg by mouth daily, Disp: , Rfl:     amoxicillin-clavulanate (AUGMENTIN) 875-125 mg per tablet, amoxicillin 875 mg-potassium clavulanate 125 mg tablet, Disp: , Rfl:     aspirin (ECOTRIN LOW STRENGTH) 81 mg EC tablet, Take 81 mg by mouth daily, Disp: , Rfl:     BYSTOLIC 20 MG tablet, Take 20 mg by mouth as needed  , Disp: , Rfl:     cephalexin (KEFLEX) 500 mg capsule, Take 1 capsule (500 mg total) by mouth every 6 (six) hours for 14 days, Disp: 56 capsule, Rfl: 1    clonazePAM (KlonoPIN) 1 mg tablet, Take 1 mg by mouth 4 (four) times a day as needed for anxiety  , Disp: , Rfl:     fluticasone (FLONASE) 50 mcg/act nasal spray, , Disp: , Rfl:     gabapentin (NEURONTIN) 300 mg capsule, gabapentin 300 mg capsule, Disp: , Rfl:     ibuprofen (MOTRIN) 600 mg tablet, Take 1 tablet (600 mg total) by mouth every 6 (six) hours as needed (for inflammation, pain) (Patient taking differently: Take 800 mg by mouth 3 (three) times a day as needed for mild pain (for inflammation, pain)  ), Disp: 30 tablet, Rfl: 1    ibuprofen (MOTRIN) 800 mg tablet, , Disp: , Rfl:     lansoprazole (PREVACID) 30 mg capsule, lansoprazole 30 mg capsule,delayed release, Disp: , Rfl:     metoprolol succinate (TOPROL-XL) 100 mg 24 hr tablet, metoprolol succinate  mg tablet,extended release 24 hr, Disp: , Rfl:     oxyCODONE-acetaminophen (PERCOCET)  mg per tablet, oxycodone-acetaminophen 10 mg-325 mg tablet, Disp: , Rfl:     QUEtiapine (SEROquel) 300 mg tablet, quetiapine 300 mg tablet, Disp: , Rfl:     simvastatin (ZOCOR) 40 mg tablet, simvastatin 40 mg tablet, Disp: , Rfl:     valsartan (DIOVAN) 160 mg tablet, 160 mg  , Disp: , Rfl:     valsartan-hydrochlorothiazide (DIOVAN-HCT) 160-12 5 MG per tablet, 1 tablet  , Disp: , Rfl:     zolpidem (AMBIEN CR) 12 5 MG CR tablet, zolpidem ER 12 5 mg tablet,extended release,multiphase, Disp: , Rfl:     ALLERGIES:  No Known Allergies    REVIEW OF SYSTEMS:  Review of Systems   Constitutional: Negative for chills, fever and unexpected weight change  HENT: Negative for hearing loss, nosebleeds and sore throat  Eyes: Negative for pain, redness and visual disturbance  Respiratory: Negative for cough, shortness of breath and wheezing  Cardiovascular: Negative for chest pain, palpitations and leg swelling  Gastrointestinal: Negative for abdominal pain, nausea and vomiting  Endocrine: Negative for polydipsia and polyuria  Genitourinary: Negative for dysuria and hematuria  Musculoskeletal: Negative for arthralgias, joint swelling and myalgias  Skin: Positive for wound  Negative for rash  Neurological: Negative for dizziness, numbness and headaches  Psychiatric/Behavioral: Negative for decreased concentration and suicidal ideas  The patient is not nervous/anxious          VITALS:  Vitals:    02/14/19 1427   BP: 136/91   Pulse: 74       LABS:  HgA1c: No results found for: HGBA1C  BMP:   Lab Results   Component Value Date    CALCIUM 9 1 01/16/2019    K 4 7 01/16/2019    CO2 28 01/16/2019    CL 94 (L) 01/16/2019    BUN 9 01/16/2019    CREATININE 0 74 01/16/2019       _____________________________________________________  PHYSICAL EXAMINATION:  General: well developed and well nourished, alert, oriented times 3 and appears comfortable  Psychiatric: Normal  HEENT: Trachea Midline, No torticollis  Pulmonary: No audible wheezing or respiratory distress   Skin: No Masses, No Erythema, No Fluctuation  Neurovascular: Sensation Intact to the Median, Ulnar, Radial Nerve, Motor Intact to the Median, Ulnar, Radial Nerve and Pulses Intact    MUSCULOSKELETAL EXAMINATION:  Right hand: Scant drainage noted from the surgical incision over the 1st metacarpal   No purulent drainage  ___________________________________________________  STUDIES REVIEWED:  No studies reviewed  PROCEDURES PERFORMED:  Procedures  No Procedures performed today    _____________________________________________________  ASSESSMENT/PLAN:    Right hand surgical wound - improving  * ID note reviewed  The patient refused inpatient treatment to get a PICC line now  He also refused to have this done in the near future due to " social and other medical issues "  The patient is scheduled to have a PICC line placed in early March  He is currently on Keflex, as per ID in the interrum  * Continue local would care with dressing  * We will follow up with the patient in about 3 weeks  Continue ABX as per ID  No surgical intervention needed at this time  Follow Up:  Return for 2-3 weeks        To Do Next Visit:  Re-evaluation of current issue      Scribe Attestation    I,:   Crystal Sue PA-C am acting as a scribe while in the presence of the attending physician :        I,:   Sree Modi MD personally performed the services described in this documentation    as scribed in my presence :

## 2019-03-07 ENCOUNTER — HOSPITAL ENCOUNTER (OUTPATIENT)
Dept: INFUSION CENTER | Facility: HOSPITAL | Age: 59
Discharge: HOME | End: 2019-03-07
Payer: COMMERCIAL

## 2019-03-07 ENCOUNTER — TELEPHONE (OUTPATIENT)
Dept: OBGYN CLINIC | Facility: HOSPITAL | Age: 59
End: 2019-03-07

## 2019-03-07 LAB
BASOPHILS # BLD AUTO: 0.04 THOUSANDS/ΜL (ref 0–0.1)
BASOPHILS NFR BLD AUTO: 1 % (ref 0–1)
CREAT SERPL-MCNC: 0.7 MG/DL (ref 0.6–1.3)
EOSINOPHIL # BLD AUTO: 0.21 THOUSAND/ΜL (ref 0–0.61)
EOSINOPHIL NFR BLD AUTO: 4 % (ref 0–6)
ERYTHROCYTE [DISTWIDTH] IN BLOOD BY AUTOMATED COUNT: 13 % (ref 11.6–15.1)
GFR SERPL CREATININE-BSD FRML MDRD: 105 ML/MIN/1.73SQ M
HCT VFR BLD AUTO: 29 % (ref 36.5–49.3)
HGB BLD-MCNC: 9.9 G/DL (ref 12–17)
IMM GRANULOCYTES # BLD AUTO: 0.04 THOUSAND/UL (ref 0–0.2)
IMM GRANULOCYTES NFR BLD AUTO: 1 % (ref 0–2)
LYMPHOCYTES # BLD AUTO: 2.45 THOUSANDS/ΜL (ref 0.6–4.47)
LYMPHOCYTES NFR BLD AUTO: 46 % (ref 14–44)
MCH RBC QN AUTO: 30 PG (ref 26.8–34.3)
MCHC RBC AUTO-ENTMCNC: 34.1 G/DL (ref 31.4–37.4)
MCV RBC AUTO: 88 FL (ref 82–98)
MONOCYTES # BLD AUTO: 0.53 THOUSAND/ΜL (ref 0.17–1.22)
MONOCYTES NFR BLD AUTO: 10 % (ref 4–12)
NEUTROPHILS # BLD AUTO: 2.01 THOUSANDS/ΜL (ref 1.85–7.62)
NEUTS SEG NFR BLD AUTO: 38 % (ref 43–75)
NRBC BLD AUTO-RTO: 0 /100 WBCS
PLATELET # BLD AUTO: 243 THOUSANDS/UL (ref 149–390)
PMV BLD AUTO: 9 FL (ref 8.9–12.7)
RBC # BLD AUTO: 3.3 MILLION/UL (ref 3.88–5.62)
WBC # BLD AUTO: 5.28 THOUSAND/UL (ref 4.31–10.16)

## 2019-03-07 PROCEDURE — 96365 THER/PROPH/DIAG IV INF INIT: CPT

## 2019-03-07 PROCEDURE — 85025 COMPLETE CBC W/AUTO DIFF WBC: CPT | Performed by: INTERNAL MEDICINE

## 2019-03-07 PROCEDURE — C1751 CATH, INF, PER/CENT/MIDLINE: HCPCS

## 2019-03-07 PROCEDURE — 36569 INSJ PICC 5 YR+ W/O IMAGING: CPT

## 2019-03-07 PROCEDURE — 82565 ASSAY OF CREATININE: CPT | Performed by: INTERNAL MEDICINE

## 2019-03-07 RX ADMIN — CEFAZOLIN SODIUM 2000 MG: 10 INJECTION, POWDER, FOR SOLUTION INTRAVENOUS at 08:41

## 2019-03-07 NOTE — PROCEDURES
PICC Line Insertion  Date/Time: 3/7/2019 8:50 AM  Performed by: Stephan Salazar RN  Authorized by: Simran Serrato MD     Patient location:  Bedside  Other Assisting Provider: Yes (comment) (Xiomy Paddock inf tech)    Consent:     Consent obtained:  Written    Consent given by:  Patient    Procedural risks discussed: consent obtained by md   Universal protocol:     Procedure explained and questions answered to patient or proxy's satisfaction: yes      Relevant documents present and verified: yes      Test results available and properly labeled: yes      Radiology Images displayed and confirmed  If images not available, report reviewed: yes      Required blood products, implants, devices, and special equipment available: yes      Site/side marked: yes      Immediately prior to procedure, a time out was called: yes      Patient identity confirmed:  Verbally with patient  Pre-procedure details:     Hand hygiene: Hand hygiene performed prior to insertion      Sterile barrier technique: All elements of maximal sterile technique followed      Skin preparation:  ChloraPrep    Skin preparation agent: Skin preparation agent completely dried prior to procedure    Indications:     PICC line indications: long term antibiotics    Anesthesia (see MAR for exact dosages):      Anesthesia method:  Local infiltration    Local anesthetic:  Lidocaine 1% w/o epi (1ml)  Procedure details:     Location:  Basilic    Vessel type: vein      Laterality:  Left    Approach: percutaneous technique used      Patient position:  Flat    Procedural supplies:  Double lumen    Catheter size:  5 Fr    Landmarks identified: yes      Ultrasound guidance: yes      Sterile ultrasound techniques: Sterile gel and sterile probe covers were used      Number of attempts:  1    Successful placement: yes      Vessel of catheter tip end:  Sherlock 3CG confirmed    Total catheter length (cm):  52    Catheter out on skin (cm):  0    Max flow rate:  999    Arm circumference:  37  Post-procedure details:     Post-procedure:  Securement device placed and dressing applied    Assessment:  Blood return through all ports and free fluid flow    Post-procedure complications: none      Patient tolerance of procedure:   Tolerated well, no immediate complications

## 2019-03-07 NOTE — TELEPHONE ENCOUNTER
Patient calling that on the way home from having his PICC placed he noticed that his arm was bleeding  He may have knocked the PICC line  The bleeding is controlled and there is a gauze covering the insertion site  VIVI NOLAN is coming to see patient at 3pm and he is hoping that they will come soon to assess  I advised he should go back to [facility] to the place that inserted the PICC but he refused  Wants [facility] to come sooner  I spoke with VIVI NOLAN and he is on the schedule for this afternoon  Patient will continue to monitor for return of bleeding

## 2019-03-07 NOTE — PLAN OF CARE
Problem: Potential for Falls  Goal: Patient will remain free of falls  Description  INTERVENTIONS:  - Assess patient frequently for physical needs  -  Identify cognitive and physical deficits and behaviors that affect risk of falls    -  [place] fall precautions as indicated by assessment   - Educate patient/family on patient safety including physical limitations  - Instruct patient to call for assistance with activity based on assessment  - Modify environment to reduce risk of injury  - Consider OT/PT consult to assist with strengthening/mobility  Outcome: Progressing

## 2019-03-07 NOTE — TELEPHONE ENCOUNTER
Patient advised that he should  Check in with ID for problems with his PICC line  Patient is going to wait until the [facility] comes to assess before calling  Patient will continue to monitor for return of bleeding symptoms and if he has to he will call ID before [facility] arrives

## 2019-03-16 ENCOUNTER — LAB REQUISITION (OUTPATIENT)
Dept: LAB | Facility: HOSPITAL | Age: 59
End: 2019-03-16
Payer: COMMERCIAL

## 2019-03-16 DIAGNOSIS — M86.041 ACUTE HEMATOGENOUS OSTEOMYELITIS OF RIGHT HAND (HCC): ICD-10-CM

## 2019-03-16 DIAGNOSIS — Z79.2 LONG TERM CURRENT USE OF ANTIBIOTICS: ICD-10-CM

## 2019-03-16 LAB
BASOPHILS # BLD AUTO: 0.07 THOUSANDS/ΜL (ref 0–0.1)
BASOPHILS NFR BLD AUTO: 1 % (ref 0–1)
CREAT SERPL-MCNC: 0.77 MG/DL (ref 0.6–1.3)
EOSINOPHIL # BLD AUTO: 0.34 THOUSAND/ΜL (ref 0–0.61)
EOSINOPHIL NFR BLD AUTO: 5 % (ref 0–6)
ERYTHROCYTE [DISTWIDTH] IN BLOOD BY AUTOMATED COUNT: 12.8 % (ref 11.6–15.1)
GFR SERPL CREATININE-BSD FRML MDRD: 101 ML/MIN/1.73SQ M
HCT VFR BLD AUTO: 34.4 % (ref 36.5–49.3)
HGB BLD-MCNC: 11.7 G/DL (ref 12–17)
IMM GRANULOCYTES # BLD AUTO: 0.04 THOUSAND/UL (ref 0–0.2)
IMM GRANULOCYTES NFR BLD AUTO: 1 % (ref 0–2)
LYMPHOCYTES # BLD AUTO: 2.21 THOUSANDS/ΜL (ref 0.6–4.47)
LYMPHOCYTES NFR BLD AUTO: 35 % (ref 14–44)
MCH RBC QN AUTO: 29.9 PG (ref 26.8–34.3)
MCHC RBC AUTO-ENTMCNC: 34 G/DL (ref 31.4–37.4)
MCV RBC AUTO: 88 FL (ref 82–98)
MONOCYTES # BLD AUTO: 0.69 THOUSAND/ΜL (ref 0.17–1.22)
MONOCYTES NFR BLD AUTO: 11 % (ref 4–12)
NEUTROPHILS # BLD AUTO: 2.95 THOUSANDS/ΜL (ref 1.85–7.62)
NEUTS SEG NFR BLD AUTO: 47 % (ref 43–75)
NRBC BLD AUTO-RTO: 0 /100 WBCS
PLATELET # BLD AUTO: 286 THOUSANDS/UL (ref 149–390)
PMV BLD AUTO: 9.1 FL (ref 8.9–12.7)
RBC # BLD AUTO: 3.91 MILLION/UL (ref 3.88–5.62)
WBC # BLD AUTO: 6.3 THOUSAND/UL (ref 4.31–10.16)

## 2019-03-16 PROCEDURE — 85025 COMPLETE CBC W/AUTO DIFF WBC: CPT | Performed by: INTERNAL MEDICINE

## 2019-03-16 PROCEDURE — 82565 ASSAY OF CREATININE: CPT | Performed by: INTERNAL MEDICINE

## 2019-03-18 ENCOUNTER — TELEPHONE (OUTPATIENT)
Dept: INFECTIOUS DISEASES | Facility: CLINIC | Age: 59
End: 2019-03-18

## 2019-03-18 NOTE — TELEPHONE ENCOUNTER
Rita Marroquin called to cancel his appointment for today  He wants to reschedule for some time next week  He states that he has a sore throat and had a temp of 100 3  He feels like his temp is fine now, but does not feel well enough to come to the appointment  I did express to the patient the importance of coming into the office especially if he has concerns of a fever  Pt adamantly refuses  He wants to just make an appointment for next week  Per Dr Danilo Yeager, ok to reschedule pt

## 2019-03-21 ENCOUNTER — TRANSCRIBE ORDERS (OUTPATIENT)
Dept: LAB | Facility: CLINIC | Age: 59
End: 2019-03-21

## 2019-03-21 ENCOUNTER — APPOINTMENT (OUTPATIENT)
Dept: LAB | Facility: CLINIC | Age: 59
End: 2019-03-21
Payer: COMMERCIAL

## 2019-03-21 ENCOUNTER — OFFICE VISIT (OUTPATIENT)
Dept: OBGYN CLINIC | Facility: CLINIC | Age: 59
End: 2019-03-21
Payer: COMMERCIAL

## 2019-03-21 VITALS
DIASTOLIC BLOOD PRESSURE: 87 MMHG | HEIGHT: 69 IN | WEIGHT: 193 LBS | BODY MASS INDEX: 28.58 KG/M2 | HEART RATE: 71 BPM | SYSTOLIC BLOOD PRESSURE: 134 MMHG

## 2019-03-21 DIAGNOSIS — M86.041 ACUTE HEMATOGENOUS OSTEOMYELITIS OF RIGHT HAND (HCC): ICD-10-CM

## 2019-03-21 DIAGNOSIS — L08.9 INFECTION OF HAND: Primary | ICD-10-CM

## 2019-03-21 DIAGNOSIS — Z79.2 ENCOUNTER FOR LONG-TERM (CURRENT) USE OF ANTIBIOTICS: ICD-10-CM

## 2019-03-21 DIAGNOSIS — Z79.2 ENCOUNTER FOR LONG-TERM (CURRENT) USE OF ANTIBIOTICS: Primary | ICD-10-CM

## 2019-03-21 DIAGNOSIS — Z48.89 AFTERCARE FOLLOWING SURGERY: ICD-10-CM

## 2019-03-21 LAB
BASOPHILS # BLD AUTO: 0.07 THOUSANDS/ΜL (ref 0–0.1)
BASOPHILS NFR BLD AUTO: 1 % (ref 0–1)
CREAT SERPL-MCNC: 0.65 MG/DL (ref 0.6–1.3)
EOSINOPHIL # BLD AUTO: 0.47 THOUSAND/ΜL (ref 0–0.61)
EOSINOPHIL NFR BLD AUTO: 9 % (ref 0–6)
ERYTHROCYTE [DISTWIDTH] IN BLOOD BY AUTOMATED COUNT: 12.8 % (ref 11.6–15.1)
GFR SERPL CREATININE-BSD FRML MDRD: 108 ML/MIN/1.73SQ M
HCT VFR BLD AUTO: 30.4 % (ref 36.5–49.3)
HGB BLD-MCNC: 10.4 G/DL (ref 12–17)
IMM GRANULOCYTES # BLD AUTO: 0.02 THOUSAND/UL (ref 0–0.2)
IMM GRANULOCYTES NFR BLD AUTO: 0 % (ref 0–2)
LYMPHOCYTES # BLD AUTO: 2.12 THOUSANDS/ΜL (ref 0.6–4.47)
LYMPHOCYTES NFR BLD AUTO: 38 % (ref 14–44)
MCH RBC QN AUTO: 30.3 PG (ref 26.8–34.3)
MCHC RBC AUTO-ENTMCNC: 34.2 G/DL (ref 31.4–37.4)
MCV RBC AUTO: 89 FL (ref 82–98)
MONOCYTES # BLD AUTO: 0.45 THOUSAND/ΜL (ref 0.17–1.22)
MONOCYTES NFR BLD AUTO: 8 % (ref 4–12)
NEUTROPHILS # BLD AUTO: 2.41 THOUSANDS/ΜL (ref 1.85–7.62)
NEUTS SEG NFR BLD AUTO: 44 % (ref 43–75)
NRBC BLD AUTO-RTO: 0 /100 WBCS
PLATELET # BLD AUTO: 252 THOUSANDS/UL (ref 149–390)
PMV BLD AUTO: 10.1 FL (ref 8.9–12.7)
RBC # BLD AUTO: 3.43 MILLION/UL (ref 3.88–5.62)
WBC # BLD AUTO: 5.54 THOUSAND/UL (ref 4.31–10.16)

## 2019-03-21 PROCEDURE — 36415 COLL VENOUS BLD VENIPUNCTURE: CPT

## 2019-03-21 PROCEDURE — 99213 OFFICE O/P EST LOW 20 MIN: CPT | Performed by: ORTHOPAEDIC SURGERY

## 2019-03-21 PROCEDURE — 82565 ASSAY OF CREATININE: CPT

## 2019-03-21 PROCEDURE — 85025 COMPLETE CBC W/AUTO DIFF WBC: CPT

## 2019-03-21 NOTE — PROGRESS NOTES
CHIEF COMPLAINT:  Chief Complaint   Patient presents with    Right Wrist - Pain       SUBJECTIVE:  Sanjana Cortez is a 64y o  year old in for f/u regarding his right hand infection  S/P mx surgeries  He was taking keflex, has PICC line in currently, placed 3/7/19  Has 4 wks of PICC left  Still has drainage serous from proximal end of incision  He continues with daily dressing changes  No signs of infection, denies numbness or tingling  PAST MEDICAL HISTORY:  Past Medical History:   Diagnosis Date    Anxiety     Chronic pain disorder     Depression     Fractures     Head injury     Hyperlipidemia     Hypertension        PAST SURGICAL HISTORY:  Past Surgical History:   Procedure Laterality Date    APPENDECTOMY      HAND SURGERY      KNEE SURGERY      CT DRAIN SKIN ABSCESS COMPLIC Right 39/5/8620    Procedure: THUMB [facility] JOINT INCISION AND DRAINAGE;  Surgeon: Sree Modi MD;  [facility];  Service: Orthopedics    CT DRAIN SKIN ABSCESS SIMPLE Right 1/16/2019    Procedure: HAND INCISION AND DRAINAGE (I&D);   Surgeon: Sree Modi MD;  [facility];  Service: Orthopedics    CT REMOVAL DEEP IMPLANT Right 10/17/2018    Procedure: WRIST REMOVAL OF HARDWARE;  Surgeon: Sree Modi MD;  [facility];  Service: Orthopedics    CT REPAIR INTERCARP/CARP-METACARP JT Right 8/28/2018    Procedure: Martha Amaya;  Surgeon: Sree Modi MD;  [facility];  Service: Orthopedics    CT WRIST Rosy Rodriguez LIG Right 8/28/2018    Procedure: ENDOSCOPIC CARPAL TUNNEL RELEASE;  Surgeon: Sree Modi MD;  [facility];  Service: Orthopedics    WRIST SURGERY         FAMILY HISTORY:  Family History   Problem Relation Age of Onset    Stroke Brother     Heart attack Brother     Colon cancer Mother     Heart disease Father     Lung cancer Father        SOCIAL HISTORY:  Social History     Tobacco Use    Smoking status: Never Smoker    Smokeless tobacco: Never Used   Substance Use Topics    Alcohol use: No     Comment: occasionally    Drug use: No       MEDICATIONS:    Current Outpatient Medications:     amLODIPine (NORVASC) 10 mg tablet, Take 10 mg by mouth daily, Disp: , Rfl:     aspirin (ECOTRIN LOW STRENGTH) 81 mg EC tablet, Take 81 mg by mouth daily, Disp: , Rfl:     BYSTOLIC 20 MG tablet, Take 20 mg by mouth as needed  , Disp: , Rfl:     clonazePAM (KlonoPIN) 1 mg tablet, Take 1 mg by mouth 4 (four) times a day as needed for anxiety  , Disp: , Rfl:     fluticasone (FLONASE) 50 mcg/act nasal spray, , Disp: , Rfl:     gabapentin (NEURONTIN) 300 mg capsule, gabapentin 300 mg capsule, Disp: , Rfl:     ibuprofen (MOTRIN) 800 mg tablet, , Disp: , Rfl:     lansoprazole (PREVACID) 30 mg capsule, lansoprazole 30 mg capsule,delayed release, Disp: , Rfl:     metoprolol succinate (TOPROL-XL) 100 mg 24 hr tablet, metoprolol succinate  mg tablet,extended release 24 hr, Disp: , Rfl:     oxyCODONE-acetaminophen (PERCOCET)  mg per tablet, oxycodone-acetaminophen 10 mg-325 mg tablet, Disp: , Rfl:     QUEtiapine (SEROquel) 300 mg tablet, quetiapine 300 mg tablet, Disp: , Rfl:     simvastatin (ZOCOR) 40 mg tablet, simvastatin 40 mg tablet, Disp: , Rfl:     valsartan (DIOVAN) 160 mg tablet, 160 mg  , Disp: , Rfl:     valsartan-hydrochlorothiazide (DIOVAN-HCT) 160-12 5 MG per tablet, 1 tablet  , Disp: , Rfl:     zolpidem (AMBIEN CR) 12 5 MG CR tablet, zolpidem ER 12 5 mg tablet,extended release,multiphase, Disp: , Rfl:     ALLERGIES:  No Known Allergies    REVIEW OF SYSTEMS:  Review of Systems   Constitutional: Negative for chills and fever  HENT: Negative for drooling and sneezing  Eyes: Negative for redness  Respiratory: Negative for cough, shortness of breath and wheezing  Psychiatric/Behavioral: The patient is not nervous/anxious          VITALS:  Vitals:    03/21/19 1507   BP: 134/87   Pulse: 71       LABS:  HgA1c: No results found for: HGBA1C  BMP:   Lab Results   Component Value Date    CALCIUM 9 1 01/16/2019    K 4 7 01/16/2019    CO2 28 01/16/2019    CL 94 (L) 01/16/2019    BUN 9 01/16/2019    CREATININE 0 77 03/16/2019       _____________________________________________________  PHYSICAL EXAMINATION:  General: well developed and well nourished, alert, oriented times 3 and appears comfortable  Psychiatric: Normal  HEENT: Trachea Midline, No torticollis  Pulmonary: No audible wheezing or respiratory distress   Skin: scant drainage from proximal end of incision  Neurovascular: Sensation Intact to the Median, Ulnar, Radial Nerve, Motor Intact to the Median, Ulnar, Radial Nerve and Pulses Intact    MUSCULOSKELETAL EXAMINATION:  Right hand  Scant drainage noted from surgical incision over 1st MC, no purulent drainage  ___________________________________________________  STUDIES REVIEWED:  No studies reviewed  PROCEDURES PERFORMED:  Procedures  No Procedures performed today    _____________________________________________________  ASSESSMENT/PLAN    Right hand surgical wound-improving    PICC line 3/7/19, he will continue with I&D  Still has some scant serous drainage from proximal area  Continue with daily dressing changes  See back in 3 wks    Diagnoses and all orders for this visit:    Infection of hand    Aftercare following surgery        Follow Up:  Return in about 3 weeks (around 4/11/2019) for Recheck            Scribe Attestation    I,:   Noé Kowalski am acting as a scribe while in the presence of the attending physician :        I,:   Jeaneth Hernandez MD personally performed the services described in this documentation    as scribed in my presence :

## 2019-03-22 ENCOUNTER — TELEPHONE (OUTPATIENT)
Dept: INFECTIOUS DISEASES | Facility: CLINIC | Age: 59
End: 2019-03-22

## 2019-03-22 NOTE — TELEPHONE ENCOUNTER
Georgette Matias, visiting nurse called me today to report that his PICC line is 4 CM out today, it had previously measured at 0 - to the skin  There are no problems with upper arm and no difficulty infusing  Per Dr Alexa Travis, he should be fine at this time and we should continue to monitor for difficulty infusing, problems at the site, or if it comes out further

## 2019-03-27 ENCOUNTER — TELEPHONE (OUTPATIENT)
Dept: INFECTIOUS DISEASES | Facility: CLINIC | Age: 59
End: 2019-03-27

## 2019-03-27 NOTE — TELEPHONE ENCOUNTER
Spoke to patient, made him aware of the importance of being seen soon to be able to continue his antibiotics  Patient stated he would check on the 4/4 date and call me back

## 2019-03-27 NOTE — TELEPHONE ENCOUNTER
Patient called this morning to cancel appointment with Dr Fitz Parker this afternoon  Patient has been non-compliant and is needing to be seen  If patient refuses to schedule Dr Fitz Parker will have to stop the IV abx  Called patient and left vm for him to return call and reschedule appointment

## 2019-03-29 ENCOUNTER — TELEPHONE (OUTPATIENT)
Dept: INFECTIOUS DISEASES | Facility: CLINIC | Age: 59
End: 2019-03-29

## 2019-03-29 NOTE — TELEPHONE ENCOUNTER
Spoke to VIVI NOLAN who reports that pt has been putting off [facility] visits all week due to conflicting doctors appts, etc  They now had to schedule him on Saturday 3/30 for a dressing change and blood draws  They are upset because Sat appts are usually reserved for more urgent spots  Apologized for the inconvenience and let the RN know that we were also having issues of noncompliance here as well  Let her know we would keep them updated if any orders changed regarding his care  Attempted to call patient to check if he was going to be able to come in on Thursday at 8:30 to see Dr Milagros Medrano, but he did not answer  LM asking pt to again call us back

## 2019-03-30 ENCOUNTER — LAB REQUISITION (OUTPATIENT)
Dept: LAB | Facility: HOSPITAL | Age: 59
End: 2019-03-30
Payer: COMMERCIAL

## 2019-03-30 DIAGNOSIS — Z79.2 LONG TERM CURRENT USE OF ANTIBIOTICS: ICD-10-CM

## 2019-03-30 DIAGNOSIS — M86.041 ACUTE HEMATOGENOUS OSTEOMYELITIS OF RIGHT HAND (HCC): ICD-10-CM

## 2019-03-30 LAB
BASOPHILS # BLD AUTO: 0.1 THOUSANDS/ΜL (ref 0–0.1)
BASOPHILS NFR BLD AUTO: 1 % (ref 0–2)
CREAT SERPL-MCNC: 1.3 MG/DL (ref 0.7–1.3)
EOSINOPHIL # BLD AUTO: 0.4 THOUSAND/ΜL (ref 0–0.61)
EOSINOPHIL NFR BLD AUTO: 5 % (ref 0–5)
ERYTHROCYTE [DISTWIDTH] IN BLOOD BY AUTOMATED COUNT: 13.5 % (ref 11.5–14.5)
GFR SERPL CREATININE-BSD FRML MDRD: 61 ML/MIN/1.73SQ M
HCT VFR BLD AUTO: 33.3 % (ref 42–47)
HGB BLD-MCNC: 11.6 G/DL (ref 14–18)
LYMPHOCYTES # BLD AUTO: 2.1 THOUSANDS/ΜL (ref 0.6–4.47)
LYMPHOCYTES NFR BLD AUTO: 28 % (ref 21–51)
MCH RBC QN AUTO: 30.7 PG (ref 26–34)
MCHC RBC AUTO-ENTMCNC: 34.7 G/DL (ref 31–37)
MCV RBC AUTO: 89 FL (ref 81–99)
MONOCYTES # BLD AUTO: 0.7 THOUSAND/ΜL (ref 0.17–1.22)
MONOCYTES NFR BLD AUTO: 9 % (ref 2–12)
NEUTROPHILS # BLD AUTO: 4.4 THOUSANDS/ΜL (ref 1.4–6.5)
NEUTS SEG NFR BLD AUTO: 58 % (ref 42–75)
PLATELET # BLD AUTO: 285 THOUSANDS/UL (ref 149–390)
PMV BLD AUTO: 7.7 FL (ref 8.6–11.7)
RBC # BLD AUTO: 3.77 MILLION/UL (ref 4.3–5.9)
WBC # BLD AUTO: 7.6 THOUSAND/UL (ref 4.8–10.8)

## 2019-03-30 PROCEDURE — 85025 COMPLETE CBC W/AUTO DIFF WBC: CPT | Performed by: INTERNAL MEDICINE

## 2019-03-30 PROCEDURE — 82565 ASSAY OF CREATININE: CPT | Performed by: INTERNAL MEDICINE

## 2019-04-01 ENCOUNTER — TELEPHONE (OUTPATIENT)
Dept: INFECTIOUS DISEASES | Facility: CLINIC | Age: 59
End: 2019-04-01

## 2019-04-01 DIAGNOSIS — L08.9 INFECTION OF HAND: Primary | ICD-10-CM

## 2019-04-04 ENCOUNTER — OFFICE VISIT (OUTPATIENT)
Dept: INFECTIOUS DISEASES | Facility: CLINIC | Age: 59
End: 2019-04-04
Payer: COMMERCIAL

## 2019-04-04 VITALS
TEMPERATURE: 97.8 F | HEART RATE: 86 BPM | HEIGHT: 69 IN | SYSTOLIC BLOOD PRESSURE: 138 MMHG | WEIGHT: 198 LBS | DIASTOLIC BLOOD PRESSURE: 87 MMHG | RESPIRATION RATE: 18 BRPM | BODY MASS INDEX: 29.33 KG/M2

## 2019-04-04 DIAGNOSIS — L08.9 INFECTION OF HAND: ICD-10-CM

## 2019-04-04 DIAGNOSIS — M86.141 OTHER ACUTE OSTEOMYELITIS OF RIGHT HAND (HCC): Primary | ICD-10-CM

## 2019-04-04 DIAGNOSIS — Z87.828 H/O OPEN HAND WOUND: ICD-10-CM

## 2019-04-04 DIAGNOSIS — Z91.19 NONCOMPLIANCE: ICD-10-CM

## 2019-04-04 PROCEDURE — 99214 OFFICE O/P EST MOD 30 MIN: CPT | Performed by: INTERNAL MEDICINE

## 2019-04-04 RX ORDER — CEPHALEXIN 500 MG/1
500 CAPSULE ORAL EVERY 6 HOURS SCHEDULED
Qty: 120 CAPSULE | Refills: 1 | Status: SHIPPED | OUTPATIENT
Start: 2019-04-04 | End: 2019-04-24 | Stop reason: SDUPTHER

## 2019-04-11 ENCOUNTER — OFFICE VISIT (OUTPATIENT)
Dept: OBGYN CLINIC | Facility: CLINIC | Age: 59
End: 2019-04-11
Payer: COMMERCIAL

## 2019-04-11 VITALS
SYSTOLIC BLOOD PRESSURE: 133 MMHG | WEIGHT: 198 LBS | HEART RATE: 71 BPM | BODY MASS INDEX: 29.33 KG/M2 | HEIGHT: 69 IN | DIASTOLIC BLOOD PRESSURE: 88 MMHG

## 2019-04-11 DIAGNOSIS — M19.049 CMC ARTHRITIS: Primary | ICD-10-CM

## 2019-04-11 PROCEDURE — 99213 OFFICE O/P EST LOW 20 MIN: CPT | Performed by: ORTHOPAEDIC SURGERY

## 2019-04-24 ENCOUNTER — OFFICE VISIT (OUTPATIENT)
Dept: INFECTIOUS DISEASES | Facility: CLINIC | Age: 59
End: 2019-04-24
Payer: COMMERCIAL

## 2019-04-24 VITALS
HEIGHT: 69 IN | DIASTOLIC BLOOD PRESSURE: 78 MMHG | SYSTOLIC BLOOD PRESSURE: 122 MMHG | BODY MASS INDEX: 29.47 KG/M2 | TEMPERATURE: 97.2 F | HEART RATE: 76 BPM | WEIGHT: 199 LBS

## 2019-04-24 DIAGNOSIS — Z91.19 NONCOMPLIANCE: ICD-10-CM

## 2019-04-24 DIAGNOSIS — Z87.828 H/O OPEN HAND WOUND: ICD-10-CM

## 2019-04-24 DIAGNOSIS — M86.141 OTHER ACUTE OSTEOMYELITIS OF RIGHT HAND (HCC): Primary | ICD-10-CM

## 2019-04-24 DIAGNOSIS — N17.9 AKI (ACUTE KIDNEY INJURY) (HCC): ICD-10-CM

## 2019-04-24 PROCEDURE — 99214 OFFICE O/P EST MOD 30 MIN: CPT | Performed by: INTERNAL MEDICINE

## 2019-04-24 RX ORDER — CEPHALEXIN 500 MG/1
500 CAPSULE ORAL EVERY 6 HOURS SCHEDULED
Qty: 120 CAPSULE | Refills: 2 | Status: SHIPPED | OUTPATIENT
Start: 2019-04-24 | End: 2019-05-24

## 2019-10-28 ENCOUNTER — TELEPHONE (OUTPATIENT)
Dept: INFECTIOUS DISEASES | Facility: CLINIC | Age: 59
End: 2019-10-28

## 2019-11-07 DIAGNOSIS — M86.141 OTHER ACUTE OSTEOMYELITIS OF RIGHT HAND (HCC): ICD-10-CM

## 2019-11-07 RX ORDER — CEPHALEXIN 500 MG/1
CAPSULE ORAL
Qty: 120 CAPSULE | Refills: 1 | OUTPATIENT
Start: 2019-11-07

## 2020-02-20 ENCOUNTER — OFFICE VISIT (OUTPATIENT)
Dept: INFECTIOUS DISEASES | Facility: CLINIC | Age: 60
End: 2020-02-20
Payer: COMMERCIAL

## 2020-02-20 VITALS
TEMPERATURE: 97.4 F | BODY MASS INDEX: 31.1 KG/M2 | DIASTOLIC BLOOD PRESSURE: 78 MMHG | HEART RATE: 77 BPM | SYSTOLIC BLOOD PRESSURE: 134 MMHG | HEIGHT: 69 IN | WEIGHT: 210 LBS

## 2020-02-20 DIAGNOSIS — Z91.19 NONCOMPLIANCE: ICD-10-CM

## 2020-02-20 DIAGNOSIS — M86.141 OTHER ACUTE OSTEOMYELITIS OF RIGHT HAND (HCC): Primary | ICD-10-CM

## 2020-02-20 DIAGNOSIS — N17.9 AKI (ACUTE KIDNEY INJURY) (HCC): ICD-10-CM

## 2020-02-20 PROCEDURE — 99214 OFFICE O/P EST MOD 30 MIN: CPT | Performed by: INTERNAL MEDICINE

## 2020-02-20 RX ORDER — SULFAMETHOXAZOLE AND TRIMETHOPRIM 800; 160 MG/1; MG/1
1 TABLET ORAL EVERY 12 HOURS SCHEDULED
Qty: 60 TABLET | Refills: 1 | Status: SHIPPED | OUTPATIENT
Start: 2020-02-20 | End: 2020-03-21

## 2020-02-20 NOTE — PROGRESS NOTES
Progress Note - Infectious Disease   Austin Fagan 64 y o  male MRN: [id number]  Unit/Bed#:  Encounter: [id number]      Impression/Recommendations:  1  Right 1st metacarpal osteomyelitis, with possible ORIF infection  Patient was treated with 6 months of antibiotic, 1 month IV and 5 months p o  last year  Patient now has elevated ESR and recurrent pain over his 1st metacarpal   I suspect that he probably does have recurrent chronic osteomyelitis  Will give him a trial of p o  Bactrim  If ESR does not decreased, patient may need to go back on IV antibiotic  Start empiric Bactrim  Monitor CBC/ESR monthly  If ESR does not decrease, patient will need to be re-initiated on IV antibiotic  2  Chronic right shoulder pain, with planned right TSA  For now, we should hold off on surgery until probable right hand osteomyelitis is under control again  Recommend holding off on TSA for now  3  Noncompliance with care in follow-up  I emphasized to patient in detail regarding the need to comply with antibiotic treatment plan and followup this time around  4  History of HALIE on IV antibiotic  Patient will need to have his creatinine monitored on antibiotic  Monitor creatinine  [facility] records and lab results reviewed in detail  Discussed with patient in detail regarding the above plan  Follow-up with me in 1 month with labs  Antibiotics:  None    Subjective:  Patient was refer by his orthopedic, Dr Amanda Link from [facility], for evaluation of elevated ESR during preop blood work for elective right TSA revision  Patient was seen by me early last year for right hand MSSA osteomyelitis and possible ORIF infection  Patient was treated with a course of IV cefazolin, followed by p o  Keflex  He had noncompliance with both IV and p o  antibiotic  IV cefazolin was stopped after 4 weeks due to HALIE and patient not coming for follow-up  P o  Keflex was stopped after 5 months    Patient was warned that there was risk for relapse  Patient states that over last few weeks, he has had increasing pain in his right hand  Incision has healed and remains healed  Last week, he started taking leftover Keflex  Pain is a little improved  No fever or chills  The following portions of the patient's history were reviewed and updated as appropriate: allergies, current medications, past medical history, past social history, past surgical history and problem list    ROS:  A complete review of systems was done  Except for what is noted above, the rest of the review of systems was negative  Objective:  Vitals:  Temperature: (!) 97 4 °F (36 3 °C)    Physical Exam:     General: Awake, alert, cooperative, no distress  Neck:  Supple  No lymphadenopathy  No mass  Lungs: Expansion symmetric, no rales, no wheezing, respirations unlabored  Heart:  Regular rate and rhythm, S1 and S2 normal, no murmur  Abdomen: Soft, nondistended, non-tender, bowel sounds active all four quadrants,        no masses, no organomegaly  Extremities: Right hand incisions are healed  No erythema/warmth  Mild diffuse tenderness  No fluctuance  Skin:  No rash  Neuro: Moves all extremities  Invasive Devices     Peripherally Inserted Central Catheter Line            PICC Line 91/91/20 Left Basilic 795 days          Peripheral Intravenous Line            Peripheral IV 01/16/19 Left Forearm 399 days                Labs studies:   I have personally reviewed pertinent labs      Imaging Studies:   I have personally reviewed pertinent imaging study reports and images in PACS  EKG, Pathology, and Other Studies:   I have personally reviewed pertinent reports

## 2020-02-20 NOTE — PATIENT INSTRUCTIONS
Please start bactrim  Take as prescribed  Follow up in one month  Have labs drawn one week prior to your appointment

## 2020-03-25 ENCOUNTER — APPOINTMENT (OUTPATIENT)
Dept: LAB | Facility: CLINIC | Age: 60
End: 2020-03-25
Payer: COMMERCIAL

## 2020-03-25 DIAGNOSIS — M86.141 OTHER ACUTE OSTEOMYELITIS OF RIGHT HAND (HCC): ICD-10-CM

## 2020-03-25 LAB
ANION GAP SERPL CALCULATED.3IONS-SCNC: 7 MMOL/L (ref 4–13)
BASOPHILS # BLD AUTO: 0.06 THOUSANDS/ΜL (ref 0–0.1)
BASOPHILS NFR BLD AUTO: 1 % (ref 0–1)
BUN SERPL-MCNC: 9 MG/DL (ref 5–25)
CALCIUM SERPL-MCNC: 9.4 MG/DL (ref 8.3–10.1)
CHLORIDE SERPL-SCNC: 92 MMOL/L (ref 100–108)
CO2 SERPL-SCNC: 27 MMOL/L (ref 21–32)
CREAT SERPL-MCNC: 0.93 MG/DL (ref 0.6–1.3)
EOSINOPHIL # BLD AUTO: 0.21 THOUSAND/ΜL (ref 0–0.61)
EOSINOPHIL NFR BLD AUTO: 2 % (ref 0–6)
ERYTHROCYTE [DISTWIDTH] IN BLOOD BY AUTOMATED COUNT: 14.7 % (ref 11.6–15.1)
GFR SERPL CREATININE-BSD FRML MDRD: 90 ML/MIN/1.73SQ M
GLUCOSE P FAST SERPL-MCNC: 107 MG/DL (ref 65–99)
HCT VFR BLD AUTO: 33.8 % (ref 36.5–49.3)
HGB BLD-MCNC: 10.8 G/DL (ref 12–17)
IMM GRANULOCYTES # BLD AUTO: 0.08 THOUSAND/UL (ref 0–0.2)
IMM GRANULOCYTES NFR BLD AUTO: 1 % (ref 0–2)
LYMPHOCYTES # BLD AUTO: 1.94 THOUSANDS/ΜL (ref 0.6–4.47)
LYMPHOCYTES NFR BLD AUTO: 19 % (ref 14–44)
MCH RBC QN AUTO: 26.2 PG (ref 26.8–34.3)
MCHC RBC AUTO-ENTMCNC: 32 G/DL (ref 31.4–37.4)
MCV RBC AUTO: 82 FL (ref 82–98)
MONOCYTES # BLD AUTO: 0.61 THOUSAND/ΜL (ref 0.17–1.22)
MONOCYTES NFR BLD AUTO: 6 % (ref 4–12)
NEUTROPHILS # BLD AUTO: 7.28 THOUSANDS/ΜL (ref 1.85–7.62)
NEUTS SEG NFR BLD AUTO: 71 % (ref 43–75)
NRBC BLD AUTO-RTO: 0 /100 WBCS
PLATELET # BLD AUTO: 429 THOUSANDS/UL (ref 149–390)
PMV BLD AUTO: 8.9 FL (ref 8.9–12.7)
POTASSIUM SERPL-SCNC: 5.2 MMOL/L (ref 3.5–5.3)
RBC # BLD AUTO: 4.12 MILLION/UL (ref 3.88–5.62)
SODIUM SERPL-SCNC: 126 MMOL/L (ref 136–145)
WBC # BLD AUTO: 10.18 THOUSAND/UL (ref 4.31–10.16)

## 2020-03-25 PROCEDURE — 36415 COLL VENOUS BLD VENIPUNCTURE: CPT

## 2020-03-25 PROCEDURE — 85652 RBC SED RATE AUTOMATED: CPT

## 2020-03-25 PROCEDURE — 85025 COMPLETE CBC W/AUTO DIFF WBC: CPT

## 2020-03-25 PROCEDURE — 80048 BASIC METABOLIC PNL TOTAL CA: CPT

## 2020-03-26 ENCOUNTER — OFFICE VISIT (OUTPATIENT)
Dept: INFECTIOUS DISEASES | Facility: CLINIC | Age: 60
End: 2020-03-26
Payer: COMMERCIAL

## 2020-03-26 VITALS
SYSTOLIC BLOOD PRESSURE: 135 MMHG | WEIGHT: 210 LBS | HEART RATE: 60 BPM | RESPIRATION RATE: 17 BRPM | TEMPERATURE: 97.8 F | HEIGHT: 72 IN | BODY MASS INDEX: 28.44 KG/M2 | DIASTOLIC BLOOD PRESSURE: 80 MMHG

## 2020-03-26 DIAGNOSIS — M86.141 OTHER ACUTE OSTEOMYELITIS OF RIGHT HAND (HCC): Primary | ICD-10-CM

## 2020-03-26 DIAGNOSIS — N17.9 AKI (ACUTE KIDNEY INJURY) (HCC): ICD-10-CM

## 2020-03-26 DIAGNOSIS — E87.1 HYPONATREMIA: ICD-10-CM

## 2020-03-26 DIAGNOSIS — Z91.19 NONCOMPLIANCE: ICD-10-CM

## 2020-03-26 LAB — ERYTHROCYTE [SEDIMENTATION RATE] IN BLOOD: 91 MM/HOUR (ref 0–10)

## 2020-03-26 PROCEDURE — 99214 OFFICE O/P EST MOD 30 MIN: CPT | Performed by: INTERNAL MEDICINE

## 2020-03-26 RX ORDER — CEPHALEXIN 500 MG/1
500 CAPSULE ORAL EVERY 6 HOURS SCHEDULED
Qty: 120 CAPSULE | Refills: 2 | Status: SHIPPED | OUTPATIENT
Start: 2020-03-26 | End: 2020-05-28 | Stop reason: SDUPTHER

## 2020-03-26 RX ORDER — SULFAMETHOXAZOLE AND TRIMETHOPRIM 800; 160 MG/1; MG/1
1 TABLET ORAL EVERY 12 HOURS SCHEDULED
COMMUNITY

## 2020-03-26 NOTE — PROGRESS NOTES
Progress Note - Infectious Disease   Kike Aguilar 64 y o  male MRN: [id number]  Unit/Bed#:  Encounter: [id number]      Impression/Recommendations:  1  Right 1st metacarpal osteomyelitis, with possible ORIF infection  Patient was treated with 6 months of antibiotic, 1 month IV and 5 months p o  last year  Patient now has elevated ESR and recurrent pain over his 1st metacarpal   I suspect that he probably does have recurrent chronic osteomyelitis  Patient is doing quite well on p o  Bactrim  Right hand pain nearly resolved  ESR decreasing  Unfortunately, patient now has hyponatremia, most likely secondary to Bactrim  Will make antibiotic change  Change antibiotic to p o  Keflex  Monitor CBC/BMP/ESR monthly      2  Chronic right shoulder pain, with planned right TSA  For now, we should hold off on surgery until probable right hand osteomyelitis is under control again  However, patient should be fine to receive intra-articular steroid injection  Recommend holding off on TSA for now  Okay for intra-articular steroid injection in shoulder      3  Hyponatremia, most likely secondary to Bactrim toxicity  Will change antibiotic, as in above  Monitor BMP  4  Noncompliance with care in follow-up  I emphasized to patient in detail regarding the need to comply with antibiotic treatment plan and followup this time around      5  History of HALIE on IV antibiotic  Patient will need to have his creatinine monitored on antibiotic  Monitor creatinine      Discussed with patient in detail regarding the above plan  Follow-up with me in 2 month with labs      Antibiotics:  None    Subjective:  Patient states his right hand feels a lot better  He still has shoulder pain  No fever/chills  He is tolerating antibiotic well  No nausea, vomiting or diarrhea      The following portions of the patient's history were reviewed and updated as appropriate: allergies, current medications, past medical history, past social history, past surgical history and problem list    ROS:  A complete review of systems was done  Except for what is noted above, the rest of the review of systems was negative  Objective:  Vitals:  [unfilled]  [email or web address] com: Temperature: 97 8 °F (36 6 °C)    Physical Exam:     General: Awake, alert, cooperative, no distress  Neck:  Supple  No lymphadenopathy  No mass  Lungs: Expansion symmetric, no rales, no wheezing, respirations unlabored  Heart:  Regular rate and rhythm, S1 and S2 normal, no murmur  Abdomen: Soft, nondistended, non-tender, bowel sounds active all four quadrants,        no masses, no organomegaly  Extremities: Right hand with no open wound/ulcer  No erythema/warmth  No tenderness  ROM is good  Skin:  No rash  Neuro: Moves all extremities  Invasive Devices     Peripherally Inserted Central Catheter Line            PICC Line 68/97/52 Left Basilic 639 days          Peripheral Intravenous Line            Peripheral IV 01/16/19 Left Forearm 434 days                Labs studies:   I have personally reviewed pertinent labs  Results from last 7 days   Lab Units 03/25/20  1453   POTASSIUM mmol/L 5 2   CHLORIDE mmol/L 92*   CO2 mmol/L 27   BUN mg/dL 9   CREATININE mg/dL 0 93   EGFR ml/min/1 73sq m 90   CALCIUM mg/dL 9 4     Results from last 7 days   Lab Units 03/25/20  1453   WBC Thousand/uL 10 18*   HEMOGLOBIN g/dL 10 8*   HEMATOCRIT % 33 8*   PLATELETS Thousands/uL 429*   NEUTROS PCT % 71   MONOS PCT % 6           Imaging Studies:   I have personally reviewed pertinent imaging study reports and images in PACS  EKG, Pathology, and Other Studies:   I have personally reviewed pertinent reports

## 2020-03-26 NOTE — PATIENT INSTRUCTIONS
Please stop Bactrim at this time  You will now start Keflex and you will take that twice per day  Please obtain labs every four weeks  Follow up with us in 2 months

## 2020-05-22 ENCOUNTER — TELEPHONE (OUTPATIENT)
Dept: INFECTIOUS DISEASES | Facility: CLINIC | Age: 60
End: 2020-05-22

## 2020-05-27 ENCOUNTER — APPOINTMENT (OUTPATIENT)
Dept: LAB | Facility: CLINIC | Age: 60
End: 2020-05-27
Payer: COMMERCIAL

## 2020-05-28 ENCOUNTER — OFFICE VISIT (OUTPATIENT)
Dept: INFECTIOUS DISEASES | Facility: CLINIC | Age: 60
End: 2020-05-28
Payer: COMMERCIAL

## 2020-05-28 VITALS
TEMPERATURE: 97.7 F | HEIGHT: 70 IN | HEART RATE: 74 BPM | DIASTOLIC BLOOD PRESSURE: 82 MMHG | SYSTOLIC BLOOD PRESSURE: 126 MMHG | BODY MASS INDEX: 30.06 KG/M2 | WEIGHT: 210 LBS | RESPIRATION RATE: 20 BRPM

## 2020-05-28 DIAGNOSIS — Z87.828 H/O OPEN HAND WOUND: ICD-10-CM

## 2020-05-28 DIAGNOSIS — E87.1 HYPONATREMIA: ICD-10-CM

## 2020-05-28 DIAGNOSIS — N17.9 AKI (ACUTE KIDNEY INJURY) (HCC): ICD-10-CM

## 2020-05-28 DIAGNOSIS — M86.141 OTHER ACUTE OSTEOMYELITIS OF RIGHT HAND (HCC): ICD-10-CM

## 2020-05-28 DIAGNOSIS — M86.141 OTHER ACUTE OSTEOMYELITIS OF RIGHT HAND (HCC): Primary | ICD-10-CM

## 2020-05-28 PROCEDURE — 99214 OFFICE O/P EST MOD 30 MIN: CPT | Performed by: INTERNAL MEDICINE

## 2020-05-28 RX ORDER — CEPHALEXIN 500 MG/1
500 CAPSULE ORAL EVERY 6 HOURS SCHEDULED
Qty: 120 CAPSULE | Refills: 0 | Status: SHIPPED | OUTPATIENT
Start: 2020-05-28 | End: 2020-05-28 | Stop reason: SDUPTHER

## 2020-05-28 RX ORDER — CEPHALEXIN 500 MG/1
500 CAPSULE ORAL EVERY 6 HOURS SCHEDULED
Qty: 120 CAPSULE | Refills: 0 | Status: SHIPPED | OUTPATIENT
Start: 2020-05-28 | End: 2020-08-26

## 2020-07-15 ENCOUNTER — TELEPHONE (OUTPATIENT)
Dept: INFECTIOUS DISEASES | Facility: CLINIC | Age: 60
End: 2020-07-15

## 2020-07-15 NOTE — TELEPHONE ENCOUNTER
Left message reminding patient he is over due for monthly labs  Also, wanted to confirm a follow up appt with Dr Vallejo on 8/13

## 2020-07-16 ENCOUNTER — APPOINTMENT (OUTPATIENT)
Dept: LAB | Facility: CLINIC | Age: 60
End: 2020-07-16
Payer: COMMERCIAL

## 2020-07-16 DIAGNOSIS — M86.141 OTHER ACUTE OSTEOMYELITIS OF RIGHT HAND (HCC): ICD-10-CM

## 2020-07-16 LAB
ANION GAP SERPL CALCULATED.3IONS-SCNC: 9 MMOL/L (ref 4–13)
BASOPHILS # BLD AUTO: 0.03 THOUSANDS/ΜL (ref 0–0.1)
BASOPHILS NFR BLD AUTO: 0 % (ref 0–1)
BUN SERPL-MCNC: 11 MG/DL (ref 5–25)
CALCIUM SERPL-MCNC: 9.8 MG/DL (ref 8.3–10.1)
CHLORIDE SERPL-SCNC: 96 MMOL/L (ref 100–108)
CO2 SERPL-SCNC: 26 MMOL/L (ref 21–32)
CREAT SERPL-MCNC: 0.68 MG/DL (ref 0.6–1.3)
EOSINOPHIL # BLD AUTO: 0.02 THOUSAND/ΜL (ref 0–0.61)
EOSINOPHIL NFR BLD AUTO: 0 % (ref 0–6)
ERYTHROCYTE [DISTWIDTH] IN BLOOD BY AUTOMATED COUNT: 13.8 % (ref 11.6–15.1)
ERYTHROCYTE [SEDIMENTATION RATE] IN BLOOD: 47 MM/HOUR (ref 0–10)
GFR SERPL CREATININE-BSD FRML MDRD: 105 ML/MIN/1.73SQ M
GLUCOSE P FAST SERPL-MCNC: 120 MG/DL (ref 65–99)
HCT VFR BLD AUTO: 39.2 % (ref 36.5–49.3)
HGB BLD-MCNC: 12.4 G/DL (ref 12–17)
IMM GRANULOCYTES # BLD AUTO: 0.06 THOUSAND/UL (ref 0–0.2)
IMM GRANULOCYTES NFR BLD AUTO: 1 % (ref 0–2)
LYMPHOCYTES # BLD AUTO: 2.61 THOUSANDS/ΜL (ref 0.6–4.47)
LYMPHOCYTES NFR BLD AUTO: 22 % (ref 14–44)
MCH RBC QN AUTO: 27.1 PG (ref 26.8–34.3)
MCHC RBC AUTO-ENTMCNC: 31.6 G/DL (ref 31.4–37.4)
MCV RBC AUTO: 86 FL (ref 82–98)
MONOCYTES # BLD AUTO: 0.57 THOUSAND/ΜL (ref 0.17–1.22)
MONOCYTES NFR BLD AUTO: 5 % (ref 4–12)
NEUTROPHILS # BLD AUTO: 8.47 THOUSANDS/ΜL (ref 1.85–7.62)
NEUTS SEG NFR BLD AUTO: 72 % (ref 43–75)
NRBC BLD AUTO-RTO: 0 /100 WBCS
PLATELET # BLD AUTO: 349 THOUSANDS/UL (ref 149–390)
PMV BLD AUTO: 9.3 FL (ref 8.9–12.7)
POTASSIUM SERPL-SCNC: 3.4 MMOL/L (ref 3.5–5.3)
RBC # BLD AUTO: 4.57 MILLION/UL (ref 3.88–5.62)
SODIUM SERPL-SCNC: 131 MMOL/L (ref 136–145)
WBC # BLD AUTO: 11.76 THOUSAND/UL (ref 4.31–10.16)

## 2020-07-16 PROCEDURE — 85025 COMPLETE CBC W/AUTO DIFF WBC: CPT | Performed by: INTERNAL MEDICINE

## 2020-07-16 PROCEDURE — 36415 COLL VENOUS BLD VENIPUNCTURE: CPT | Performed by: INTERNAL MEDICINE

## 2020-07-16 PROCEDURE — 85652 RBC SED RATE AUTOMATED: CPT | Performed by: INTERNAL MEDICINE

## 2020-07-16 PROCEDURE — 80048 BASIC METABOLIC PNL TOTAL CA: CPT | Performed by: INTERNAL MEDICINE

## 2020-08-12 ENCOUNTER — TELEPHONE (OUTPATIENT)
Dept: INFECTIOUS DISEASES | Facility: CLINIC | Age: 60
End: 2020-08-12

## 2020-08-18 ENCOUNTER — TELEPHONE (OUTPATIENT)
Dept: INFECTIOUS DISEASES | Facility: CLINIC | Age: 60
End: 2020-08-18

## 2020-08-19 ENCOUNTER — TELEMEDICINE (OUTPATIENT)
Dept: INFECTIOUS DISEASES | Facility: CLINIC | Age: 60
End: 2020-08-19
Payer: COMMERCIAL

## 2020-08-19 DIAGNOSIS — M51.26 LUMBAR HERNIATED DISC: ICD-10-CM

## 2020-08-19 DIAGNOSIS — Z91.19 NONCOMPLIANCE: ICD-10-CM

## 2020-08-19 DIAGNOSIS — M86.641: Primary | ICD-10-CM

## 2020-08-19 DIAGNOSIS — E87.1 HYPONATREMIA: ICD-10-CM

## 2020-08-19 PROCEDURE — 99214 OFFICE O/P EST MOD 30 MIN: CPT | Performed by: INTERNAL MEDICINE

## 2020-08-19 NOTE — PATIENT INSTRUCTIONS
Please continue taking your keflex as prescribed  Call us at [phone] for refills  Obtain monthly labwork  Follow up with us after your back surgery  Call our office with any questions or concerns in the interim

## 2020-08-19 NOTE — PROGRESS NOTES
Virtual Brief Visit    Impression/Recommendations:  1  Right 1st metacarpal osteomyelitis, with possible ORIF infection   Patient was treated with 6 months of antibiotic, 1 month IV and 5 months p o  last year  Jerry Juarez now has elevated ESR and recurrent pain over his 1st metacarpal   I suspect that he probably does have recurrent chronic osteomyelitis   Patient is doing quite well on p o  antibiotic   Right hand pain mild chronic   ESR decreasing, now down to 47    Patient is tolerating Keflex well  Continue p o  Keflex until ESR normalizes  Monitor CBC/BMP/ESR monthly      2  Herniated lumbar disc  Plan for back surgery noted  At this point, with chronic hand osteomyelitis under control, patient is cleared for surgery, from ID viewpoint  3  Chronic right shoulder pain, with planned right TSA   For now, we should hold off on surgery until probable right hand osteomyelitis is under control again   However, patient should be fine to receive intra-articular steroid injection  Recommend holding off on TSA for now  Okay for intra-articular steroid injection in shoulder      4   Hyponatremia, most likely secondary to Bactrim toxicity  Serum sodium improved off Bactrim              Monitor BMP      5  Noncompliance with care in follow-up  Wilmer Asencio emphasized to patient in detail regarding the need to comply with antibiotic treatment plan and followup this time around  Patient appears to be much more compliant this time around      6  History of HALIE on IV antibiotic   Patient will need to have his creatinine monitored on antibiotic  Creatinine remains in the normal range  Monitor creatinine      Discussed with patient in detail regarding the above plan  Follow-up with me after patient recovers and back surgery      Antibiotics:  Keflex x 5 months     Subjective:  Patient has had problem with increased low back pain over last month  He is unable to get out of bed  He saw back surgeon and [address]  Apparently, by patient report, lumbar spine MRI shows significant herniated discs  He is being evaluated for back surgery  Otherwise, pain in his right hand is mild, chronic and stable  He still has shoulder pain, which is also stable  No fever/chills  He is tolerating antibiotic well   No nausea, vomiting or diarrhea  Problem List Items Addressed This Visit     None                Reason for visit is followup chronic right hand osteomyelitis  Encounter provider Anusha Mack MD    Provider located at [address] [place] [id number] [phone]    Recent Visits  Date Type Provider Dept   [facility]   08/12/20 Telephone [facility]   Showing recent visits within past 7 days and meeting all other requirements     Future Appointments  No visits were found meeting these conditions  Showing future appointments within next 150 days and meeting all other requirements      Video connection was attempted  Unfortunately, patient was unable to connect on his and   Visit was proceeded by a telephone connection  After connecting through [employer] and patient was informed that this is a secure, HIPAA-compliant platform  He agrees to proceed  , the patient was identified by name and date of birth  Kim Zuniga was informed that this is a telemedicine visit and that the visit is being conducted through [employer] and patient was informed that this is a secure, HIPAA-compliant platform  He agrees to proceed     My office door was closed  No one else was in the room  He acknowledged consent and understanding of privacy and security of the platform  The patient has agreed to participate and understands he can discontinue the visit at any time  Patient is aware this is a billable service         Past Medical History:   Diagnosis Date    Anxiety     Chronic pain disorder     Depression     Fractures     Head injury     Hyperlipidemia     Hypertension        Past Surgical History:   Procedure Laterality Date    APPENDECTOMY      HAND SURGERY      KNEE SURGERY      MT DRAIN SKIN ABSCESS COMPLIC Right 50/5/5578    Procedure: THUMB [facility] JOINT INCISION AND DRAINAGE;  Surgeon: Jared Li MD;  [facility];  Service: Orthopedics    MT DRAIN SKIN ABSCESS SIMPLE Right 1/16/2019    Procedure: HAND INCISION AND DRAINAGE (I&D);   Surgeon: Jared Li MD;  [facility];  Service: Orthopedics    MT REMOVAL DEEP IMPLANT Right 10/17/2018    Procedure: WRIST REMOVAL OF HARDWARE;  Surgeon: Jared Li MD;  [facility];  Service: Orthopedics    MT REPAIR INTERCARP/CARP-METACARP JT Right 8/28/2018    Procedure: Edgemont Sieving SUSPENSIONPLASTY;  Surgeon: Jared Li MD;  [facility];  Service: Orthopedics    MT WRIST Charmel Zahra LIG Right 8/28/2018    Procedure: ENDOSCOPIC CARPAL TUNNEL RELEASE;  Surgeon: Jared Li MD;  [facility];  Service: Orthopedics    WRIST SURGERY         Current Outpatient Medications   Medication Sig Dispense Refill    amLODIPine (NORVASC) 10 mg tablet Take 10 mg by mouth daily      aspirin (ECOTRIN LOW STRENGTH) 81 mg EC tablet Take 81 mg by mouth daily      BYSTOLIC 20 MG tablet Take 20 mg by mouth as needed        cephalexin (KEFLEX) 500 mg capsule Take 1 capsule (500 mg total) by mouth every 6 (six) hours 120 capsule 0    clonazePAM (KlonoPIN) 1 mg tablet Take 1 mg by mouth 4 (four) times a day as needed for anxiety        fluticasone (FLONASE) 50 mcg/act nasal spray       gabapentin (NEURONTIN) 300 mg capsule gabapentin 300 mg capsule      ibuprofen (MOTRIN) 800 mg tablet       lansoprazole (PREVACID) 30 mg capsule lansoprazole 30 mg capsule,delayed release      metoprolol succinate (TOPROL-XL) 100 mg 24 hr tablet metoprolol succinate  mg tablet,extended release 24 hr      oxyCODONE-acetaminophen (PERCOCET)  mg per tablet oxycodone-acetaminophen 10 mg-325 mg tablet      QUEtiapine (SEROquel) 300 mg tablet quetiapine 300 mg tablet      simvastatin (ZOCOR) 40 mg tablet simvastatin 40 mg tablet      sulfamethoxazole-trimethoprim (BACTRIM DS) 800-160 mg per tablet Take 1 tablet by mouth every 12 (twelve) hours      valsartan (DIOVAN) 160 mg tablet 160 mg        valsartan-hydrochlorothiazide (DIOVAN-HCT) 160-12 5 MG per tablet 1 tablet        zolpidem (AMBIEN CR) 12 5 MG CR tablet zolpidem ER 12 5 mg tablet,extended release,multiphase       No current facility-administered medications for this visit  No Known Allergies    Review of Systems    There were no vitals filed for this visit  I spent 20 minutes directly with the patient during this visit    [address]  acknowledges that he has consented to an online visit or consultation  He understands that the online visit is based solely on information provided by him, and that, in the absence of a face-to-face physical evaluation by the physician, the diagnosis he receives is both limited and provisional in terms of accuracy and completeness  This is not intended to replace a full medical face-to-face evaluation by the physician  Coby Alvarado understands and accepts these terms

## 2020-09-18 DIAGNOSIS — M86.641: Primary | ICD-10-CM

## 2020-09-18 RX ORDER — CEPHALEXIN 500 MG/1
500 CAPSULE ORAL EVERY 6 HOURS SCHEDULED
Qty: 120 CAPSULE | Refills: 1 | Status: SHIPPED | OUTPATIENT
Start: 2020-09-18 | End: 2020-11-13 | Stop reason: SDUPTHER

## 2020-11-13 DIAGNOSIS — M86.641: ICD-10-CM

## 2020-11-13 RX ORDER — CEPHALEXIN 500 MG/1
500 CAPSULE ORAL EVERY 6 HOURS SCHEDULED
Qty: 360 CAPSULE | Refills: 0 | Status: SHIPPED | OUTPATIENT
Start: 2020-11-13 | End: 2021-02-11

## 2022-08-24 NOTE — ANESTHESIA PREPROCEDURE EVALUATION
Review of Systems/Medical History  Patient summary reviewed  Chart reviewed  No history of anesthetic complications     Cardiovascular  Exercise tolerance (METS): >4,  Hyperlipidemia, Hypertension ,    Pulmonary  Negative pulmonary ROS Not a smoker , No recent URI ,        GI/Hepatic  Negative GI/hepatic ROS   No GERD ,   Comment: Confirmed NPO appropriate     Negative  ROS        Endo/Other    Comment: Recurrent infection of right thumb    GYN       Hematology  Negative hematology ROS      Musculoskeletal  Negative musculoskeletal ROS        Neurology      Comment: Hx of head injury Psychology   Anxiety, Depression ,   Chronic pain,            Physical Exam    Airway    Mallampati score: II  TM Distance: >3 FB  Neck ROM: limited     Dental   No notable dental hx     Cardiovascular  Rhythm: regular, Rate: normal,     Pulmonary  Breath sounds clear to auscultation,     Other Findings        Anesthesia Plan  ASA Score- 2     Anesthesia Type- IV sedation with anesthesia with ASA Monitors  Additional Monitors:   Airway Plan:     Comment: I discussed the risks and benefits of IV sedation anesthesia including the possibility of the need to convert to general anesthesia and the potential risk of awareness  The patient was given the opportunity to ask questions, which were answered        Plan Factors-    Induction- intravenous  Postoperative Plan- Plan for postoperative opioid use  Informed Consent- Anesthetic plan and risks discussed with patient  I personally reviewed this patient with the CRNA  Discussed and agreed on the Anesthesia Plan with the CRNA             Lab Results   Component Value Date    WBC 8 27 10/13/2018    HGB 10 8 (L) 10/13/2018    HCT 31 6 (L) 10/13/2018    MCV 88 10/13/2018     10/13/2018     Lab Results   Component Value Date    CALCIUM 8 9 10/13/2018    K 4 5 10/13/2018    CO2 26 10/13/2018    CL 94 (L) 10/13/2018    BUN 20 10/13/2018    CREATININE 0 92 10/13/2018     No results found for: ALT, AST, GGT, ALKPHOS, BILITOT  No results found for: INR, PROTIME 16

## 2023-08-07 ENCOUNTER — TELEPHONE (OUTPATIENT)
Age: 63
End: 2023-08-07

## 2023-08-07 NOTE — TELEPHONE ENCOUNTER
Caller: Patient     Doctor: Alix Trotter    Reason for call: Patient calling to schedule with Dr Alix Trotter. Advised patient that he is not with [facility] anymore.     Call back#:

## 2024-08-30 NOTE — INTERVAL H&P NOTE
H&P reviewed  After examining the patient I find no changes in the patients condition since the H&P had been written  Refill passed per [facility] protocol.  Requested Prescriptions   Pending Prescriptions Disp Refills    LANTUS SOLOSTAR 100 UNIT/ML Subcutaneous Solution Pen-injector 12 each 1     Sig: Inject 25 Units into the skin nightly.       Diabetes Medication Protocol Passed - 8/30/2024 10:10 AM        Passed - Last A1C < 7.5 and within past 6 months     Lab Results   Component Value Date    A1C 6.5 (H) 08/28/2024             Passed - In person appointment or virtual visit in the past 6 mos or appointment in next 3 mos     Recent Outpatient Visits              2 days ago Type 2 diabetes mellitus without complication, with long-term current use of insulin ([facility])    [facility] Niki Pozo MD    Office Visit    1 month ago Partial nontraumatic tear of right rotator cuff    [facility] Jaison Russell DO    Office Visit    1 month ago Cervical myelopathy ([facility])    [facility] Piotr Kerns MD    Office Visit    2 months ago     [facility] Lizette Ware, ROCÍO    Office Visit    2 months ago     [facility] Lizette Ware, ROCÍO    Office Visit          Future Appointments         Provider Department Appt Notes    In 5 days Chin Mccrary MD [facility] Referral Needed    In 1 month Piotr Kerns MD [facility] 3 month f/ up    In 3 months Niki Pozo MD [facility]                     Passed - Microalbumin procedure in past 12 months or taking ACE/ARB        Passed - EGFRCR or GFRNAA > 50     GFR Evaluation  EGFRCR: 97 , resulted on 2/24/2024          Passed - GFR in the past 12 months          Dulaglutide (TRULICITY) 4.5 MG/0.5ML Subcutaneous Solution Pen-injector 12 Pen 1     Sig: Inject 4.5 mg into the skin once a  week.       Diabetes Medication Protocol Passed - 8/30/2024 10:10 AM        Passed - Last A1C < 7.5 and within past 6 months     Lab Results   Component Value Date    A1C 6.5 (H) 08/28/2024             Passed - In person appointment or virtual visit in the past 6 mos or appointment in next 3 mos     Recent Outpatient Visits              2 days ago Type 2 diabetes mellitus without complication, with long-term current use of insulin ([facility])    [facility] Niki Pozo MD    Office Visit    1 month ago Partial nontraumatic tear of right rotator cuff    [facility]    Office Visit    1 month ago Cervical myelopathy ([facility]    Office Visit    2 months ago     [facility] Lizette Ware, PT    Office Visit    2 months ago     [facility] Lizette Ware, PT    Office Visit          Future Appointments         Provider Department Appt Notes    In 5 days [facility]rary [facility]    In 1 month [facility]iotr Kerns MD [facility] 3 month f/ up    In 3 months Niki Pozo MD [facility]                     Passed - Microalbumin procedure in past 12 months or taking ACE/ARB        Passed - EGFRCR or GFRNAA > 50     GFR Evaluation  EGFRCR: 97 , resulted on 2/24/2024          Passed - GFR in the past 12 months           Future Appointments         Provider Department Appt Notes    In 5 days [facility]rary [facility]    In 1 month Piotr Kerns MD [facility] 3 month f/ up    In 3 months Niki Pozo MD [facility]  [place]           Recent Outpatient Visits              2 days ago Type 2 diabetes mellitus without complication, with long-term current use of insulin (HCC)    [facility] Niki Sellers MD    Office Visit    1 month ago Partial nontraumatic tear of right rotator cuff    [facility]    Office Visit    1 month ago Cervical myelopathy (HCC)    [facility] Piotr Mejía MD    Office Visit    2 months ago     [facility] Lizette Ware, PT    Office Visit    2 months ago     [facility] Lizette Ware, PT    Office Visit

## (undated) DEVICE — BANDAGE, ESMARK LF STR 6"X9' (20/CS): Brand: CYPRESS

## (undated) DEVICE — COBAN 2 IN UNSTERILE

## (undated) DEVICE — TUBING SUCTION 5MM X 12 FT

## (undated) DEVICE — STRETCH BANDAGE: Brand: CURITY

## (undated) DEVICE — NON-STERILE REUSABLE TOURNIQUET CUFF SINGLE BLADDER, DUAL PORT AND QUICK CONNECT CONNECTOR: Brand: COLOR CUFF

## (undated) DEVICE — PENCIL ELECTROSURG E-Z CLEAN -0035H

## (undated) DEVICE — STERILE BETHLEHEM PLASTIC HAND: Brand: CARDINAL HEALTH

## (undated) DEVICE — DRAPE SHEET THREE QUARTER

## (undated) DEVICE — LIGHT HANDLE COVER SLEEVE DISP BLUE STELLAR

## (undated) DEVICE — REM POLYHESIVE ADULT PATIENT RETURN ELECTRODE: Brand: VALLEYLAB

## (undated) DEVICE — GAUZE SPONGES,16 PLY: Brand: CURITY

## (undated) DEVICE — SUT ETHILON 4-0 PS-2 18 IN 1667H

## (undated) DEVICE — GLOVE SRG BIOGEL 8

## (undated) DEVICE — GLOVE SRG BIOGEL 6.5

## (undated) DEVICE — PAD CAST 3 IN COTTON NON STRL

## (undated) DEVICE — KNIFE RETROGRADE LIGAMENT

## (undated) DEVICE — CURITY NON-ADHERENT STRIPS: Brand: CURITY

## (undated) DEVICE — BRUSH EZ SCRUB PCMX W/NAIL CLEANER

## (undated) DEVICE — NEEDLE 25G X 1 1/2

## (undated) DEVICE — BUTTON SWITCH PENCIL HOLSTER: Brand: VALLEYLAB

## (undated) DEVICE — SPLINT ORTHO-GLASS 4IN X 15FT

## (undated) DEVICE — ARM SLING: Brand: DEROYAL

## (undated) DEVICE — GLOVE INDICATOR PI UNDERGLOVE SZ 6.5 BLUE

## (undated) DEVICE — DRAPE C-ARM X-RAY

## (undated) DEVICE — INTENDED FOR TISSUE SEPARATION, AND OTHER PROCEDURES THAT REQUIRE A SHARP SURGICAL BLADE TO PUNCTURE OR CUT.: Brand: BARD-PARKER ® CARBON RIB-BACK BLADES

## (undated) DEVICE — SUTURETAPE 1.3MM W/NEEDLE WHITE/BLUE

## (undated) DEVICE — C-WIRE PAK DOUBLE ENDED ORTHOPAEDIC WIRE, SPADE, .062" (1.57 MM)
Type: IMPLANTABLE DEVICE | Site: FINGER | Status: FUNCTIONAL
Brand: C-WIRE
Removed: 2018-08-28

## (undated) DEVICE — ACE WRAP 2 IN STERILE

## (undated) DEVICE — KIT F/SWIVELOCK SL 3.5 X 8.5MM DISP